# Patient Record
Sex: MALE | Race: WHITE | NOT HISPANIC OR LATINO | Employment: OTHER | URBAN - METROPOLITAN AREA
[De-identification: names, ages, dates, MRNs, and addresses within clinical notes are randomized per-mention and may not be internally consistent; named-entity substitution may affect disease eponyms.]

---

## 2017-02-10 ENCOUNTER — HOSPITAL ENCOUNTER (OUTPATIENT)
Dept: RESPIRATORY THERAPY | Facility: HOSPITAL | Age: 72
Discharge: HOME | End: 2017-02-10
Attending: FAMILY MEDICINE | Admitting: FAMILY MEDICINE

## 2017-10-27 ENCOUNTER — HOSPITAL ENCOUNTER (OUTPATIENT)
Dept: GENERAL RADIOLOGY | Facility: HOSPITAL | Age: 72
Discharge: HOME | End: 2017-10-27
Attending: PHYSICAL MEDICINE & REHABILITATION | Admitting: PHYSICAL MEDICINE & REHABILITATION

## 2019-01-01 ENCOUNTER — TELEPHONE (OUTPATIENT)
Dept: FAMILY MEDICINE CLINIC | Facility: CLINIC | Age: 74
End: 2019-01-01

## 2019-01-01 ENCOUNTER — OFFICE VISIT (OUTPATIENT)
Dept: FAMILY MEDICINE CLINIC | Facility: CLINIC | Age: 74
End: 2019-01-01

## 2019-01-01 ENCOUNTER — HOSPITAL ENCOUNTER (EMERGENCY)
Facility: HOSPITAL | Age: 74
Discharge: HOME | End: 2019-11-25
Attending: EMERGENCY MEDICINE | Admitting: EMERGENCY MEDICINE

## 2019-01-01 ENCOUNTER — APPOINTMENT (OUTPATIENT)
Dept: GENERAL RADIOLOGY | Facility: HOSPITAL | Age: 74
End: 2019-01-01

## 2019-01-01 ENCOUNTER — PATIENT OUTREACH (OUTPATIENT)
Dept: CASE MANAGEMENT | Facility: OTHER | Age: 74
End: 2019-01-01

## 2019-01-01 VITALS
HEIGHT: 65 IN | SYSTOLIC BLOOD PRESSURE: 122 MMHG | HEART RATE: 76 BPM | BODY MASS INDEX: 24.16 KG/M2 | RESPIRATION RATE: 16 BRPM | DIASTOLIC BLOOD PRESSURE: 78 MMHG | TEMPERATURE: 98.2 F | WEIGHT: 145 LBS

## 2019-01-01 VITALS
HEART RATE: 89 BPM | RESPIRATION RATE: 16 BRPM | WEIGHT: 148 LBS | TEMPERATURE: 98 F | HEIGHT: 68 IN | BODY MASS INDEX: 22.43 KG/M2 | SYSTOLIC BLOOD PRESSURE: 132 MMHG | DIASTOLIC BLOOD PRESSURE: 89 MMHG | OXYGEN SATURATION: 99 %

## 2019-01-01 DIAGNOSIS — G89.29 CHRONIC BILATERAL LOW BACK PAIN WITHOUT SCIATICA: ICD-10-CM

## 2019-01-01 DIAGNOSIS — G89.29 CHRONIC BILATERAL LOW BACK PAIN WITHOUT SCIATICA: Primary | ICD-10-CM

## 2019-01-01 DIAGNOSIS — K21.00 GASTROESOPHAGEAL REFLUX DISEASE WITH ESOPHAGITIS: ICD-10-CM

## 2019-01-01 DIAGNOSIS — M54.50 CHRONIC BILATERAL LOW BACK PAIN WITHOUT SCIATICA: Primary | ICD-10-CM

## 2019-01-01 DIAGNOSIS — I50.9 ACUTE ON CHRONIC CONGESTIVE HEART FAILURE, UNSPECIFIED HEART FAILURE TYPE (HCC): ICD-10-CM

## 2019-01-01 DIAGNOSIS — F31.77 BIPOLAR DISORDER, IN PARTIAL REMISSION, MOST RECENT EPISODE MIXED (HCC): ICD-10-CM

## 2019-01-01 DIAGNOSIS — I50.82 BIVENTRICULAR HEART FAILURE (HCC): ICD-10-CM

## 2019-01-01 DIAGNOSIS — J44.1 CHRONIC OBSTRUCTIVE PULMONARY DISEASE WITH ACUTE EXACERBATION (HCC): Primary | ICD-10-CM

## 2019-01-01 DIAGNOSIS — F41.1 GENERALIZED ANXIETY DISORDER: ICD-10-CM

## 2019-01-01 DIAGNOSIS — R06.00 DYSPNEA, UNSPECIFIED TYPE: Primary | ICD-10-CM

## 2019-01-01 DIAGNOSIS — M54.50 CHRONIC BILATERAL LOW BACK PAIN WITHOUT SCIATICA: ICD-10-CM

## 2019-01-01 LAB
ANION GAP SERPL CALCULATED.3IONS-SCNC: 11 MMOL/L (ref 5–15)
BASOPHILS # BLD AUTO: 0 10*3/MM3 (ref 0–0.2)
BASOPHILS NFR BLD AUTO: 0.5 % (ref 0–1.5)
BUN BLD-MCNC: 12 MG/DL (ref 8–23)
BUN/CREAT SERPL: 12.9 (ref 7–25)
CALCIUM SPEC-SCNC: 8.8 MG/DL (ref 8.6–10.5)
CHLORIDE SERPL-SCNC: 96 MMOL/L (ref 98–107)
CO2 SERPL-SCNC: 27 MMOL/L (ref 22–29)
CREAT BLD-MCNC: 0.93 MG/DL (ref 0.76–1.27)
DEPRECATED RDW RBC AUTO: 45.5 FL (ref 37–54)
EOSINOPHIL # BLD AUTO: 0.1 10*3/MM3 (ref 0–0.4)
EOSINOPHIL NFR BLD AUTO: 1 % (ref 0.3–6.2)
ERYTHROCYTE [DISTWIDTH] IN BLOOD BY AUTOMATED COUNT: 14.1 % (ref 12.3–15.4)
GFR SERPL CREATININE-BSD FRML MDRD: 79 ML/MIN/1.73
GLUCOSE BLD-MCNC: 98 MG/DL (ref 65–99)
HCT VFR BLD AUTO: 33.2 % (ref 37.5–51)
HGB BLD-MCNC: 11.2 G/DL (ref 13–17.7)
LYMPHOCYTES # BLD AUTO: 1.1 10*3/MM3 (ref 0.7–3.1)
LYMPHOCYTES NFR BLD AUTO: 11.5 % (ref 19.6–45.3)
MCH RBC QN AUTO: 30.8 PG (ref 26.6–33)
MCHC RBC AUTO-ENTMCNC: 33.6 G/DL (ref 31.5–35.7)
MCV RBC AUTO: 91.7 FL (ref 79–97)
MONOCYTES # BLD AUTO: 1 10*3/MM3 (ref 0.1–0.9)
MONOCYTES NFR BLD AUTO: 10.2 % (ref 5–12)
NEUTROPHILS # BLD AUTO: 7.2 10*3/MM3 (ref 1.7–7)
NEUTROPHILS NFR BLD AUTO: 76.8 % (ref 42.7–76)
NRBC BLD AUTO-RTO: 0 /100 WBC (ref 0–0.2)
NT-PROBNP SERPL-MCNC: 5871 PG/ML (ref 5–1800)
PLATELET # BLD AUTO: 297 10*3/MM3 (ref 140–450)
PMV BLD AUTO: 6.5 FL (ref 6–12)
POTASSIUM BLD-SCNC: 4.3 MMOL/L (ref 3.5–5.2)
RBC # BLD AUTO: 3.62 10*6/MM3 (ref 4.14–5.8)
SODIUM BLD-SCNC: 134 MMOL/L (ref 136–145)
TROPONIN T SERPL-MCNC: <0.01 NG/ML (ref 0–0.03)
WBC NRBC COR # BLD: 9.4 10*3/MM3 (ref 3.4–10.8)

## 2019-01-01 PROCEDURE — 71045 X-RAY EXAM CHEST 1 VIEW: CPT

## 2019-01-01 PROCEDURE — 99284 EMERGENCY DEPT VISIT MOD MDM: CPT

## 2019-01-01 PROCEDURE — 84484 ASSAY OF TROPONIN QUANT: CPT | Performed by: EMERGENCY MEDICINE

## 2019-01-01 PROCEDURE — 85025 COMPLETE CBC W/AUTO DIFF WBC: CPT | Performed by: EMERGENCY MEDICINE

## 2019-01-01 PROCEDURE — 80048 BASIC METABOLIC PNL TOTAL CA: CPT | Performed by: EMERGENCY MEDICINE

## 2019-01-01 PROCEDURE — 94640 AIRWAY INHALATION TREATMENT: CPT

## 2019-01-01 PROCEDURE — 96374 THER/PROPH/DIAG INJ IV PUSH: CPT

## 2019-01-01 PROCEDURE — 94799 UNLISTED PULMONARY SVC/PX: CPT

## 2019-01-01 PROCEDURE — 25010000002 FUROSEMIDE PER 20 MG: Performed by: EMERGENCY MEDICINE

## 2019-01-01 PROCEDURE — 99214 OFFICE O/P EST MOD 30 MIN: CPT | Performed by: FAMILY MEDICINE

## 2019-01-01 PROCEDURE — 83880 ASSAY OF NATRIURETIC PEPTIDE: CPT | Performed by: EMERGENCY MEDICINE

## 2019-01-01 PROCEDURE — 96375 TX/PRO/DX INJ NEW DRUG ADDON: CPT

## 2019-01-01 PROCEDURE — 93005 ELECTROCARDIOGRAM TRACING: CPT | Performed by: EMERGENCY MEDICINE

## 2019-01-01 PROCEDURE — 25010000002 METHYLPREDNISOLONE PER 125 MG: Performed by: EMERGENCY MEDICINE

## 2019-01-01 RX ORDER — IPRATROPIUM BROMIDE AND ALBUTEROL SULFATE 2.5; .5 MG/3ML; MG/3ML
3 SOLUTION RESPIRATORY (INHALATION)
Qty: 360 ML | Refills: 5 | Status: SHIPPED | OUTPATIENT
Start: 2019-01-01 | End: 2020-01-01

## 2019-01-01 RX ORDER — METHYLPREDNISOLONE SODIUM SUCCINATE 125 MG/2ML
125 INJECTION, POWDER, LYOPHILIZED, FOR SOLUTION INTRAMUSCULAR; INTRAVENOUS ONCE
Status: COMPLETED | OUTPATIENT
Start: 2019-01-01 | End: 2019-01-01

## 2019-01-01 RX ORDER — AMIODARONE HYDROCHLORIDE 200 MG/1
TABLET ORAL
Qty: 45 TABLET | Refills: 2 | Status: SHIPPED | OUTPATIENT
Start: 2019-01-01 | End: 2019-01-01

## 2019-01-01 RX ORDER — MORPHINE SULFATE 15 MG/1
15 TABLET, FILM COATED, EXTENDED RELEASE ORAL 2 TIMES DAILY
Qty: 60 TABLET | Refills: 0 | Status: SHIPPED | OUTPATIENT
Start: 2019-01-01 | End: 2020-01-01 | Stop reason: SDUPTHER

## 2019-01-01 RX ORDER — QUETIAPINE FUMARATE 50 MG/1
50 TABLET, FILM COATED ORAL NIGHTLY
Refills: 0 | COMMUNITY
Start: 2019-01-01

## 2019-01-01 RX ORDER — OXYCODONE AND ACETAMINOPHEN 10; 325 MG/1; MG/1
1 TABLET ORAL
Qty: 150 TABLET | Refills: 0 | Status: SHIPPED | OUTPATIENT
Start: 2019-01-01 | End: 2019-01-01 | Stop reason: SDUPTHER

## 2019-01-01 RX ORDER — PAROXETINE HYDROCHLORIDE 40 MG/1
40 TABLET, FILM COATED ORAL DAILY
Refills: 0 | COMMUNITY
Start: 2019-01-01

## 2019-01-01 RX ORDER — MORPHINE SULFATE 15 MG/1
15 TABLET, FILM COATED, EXTENDED RELEASE ORAL 2 TIMES DAILY
Qty: 60 TABLET | Refills: 0 | Status: SHIPPED | OUTPATIENT
Start: 2019-01-01 | End: 2019-01-01 | Stop reason: SDUPTHER

## 2019-01-01 RX ORDER — SODIUM CHLORIDE 0.9 % (FLUSH) 0.9 %
10 SYRINGE (ML) INJECTION AS NEEDED
Status: DISCONTINUED | OUTPATIENT
Start: 2019-01-01 | End: 2019-01-01 | Stop reason: HOSPADM

## 2019-01-01 RX ORDER — OMEPRAZOLE 20 MG/1
CAPSULE, DELAYED RELEASE ORAL
COMMUNITY
Start: 2015-02-19 | End: 2020-01-01 | Stop reason: RX

## 2019-01-01 RX ORDER — FUROSEMIDE 10 MG/ML
40 INJECTION INTRAMUSCULAR; INTRAVENOUS ONCE
Status: COMPLETED | OUTPATIENT
Start: 2019-01-01 | End: 2019-01-01

## 2019-01-01 RX ORDER — IPRATROPIUM BROMIDE AND ALBUTEROL SULFATE 2.5; .5 MG/3ML; MG/3ML
3 SOLUTION RESPIRATORY (INHALATION) ONCE
Status: COMPLETED | OUTPATIENT
Start: 2019-01-01 | End: 2019-01-01

## 2019-01-01 RX ORDER — LAMOTRIGINE 100 MG/1
200 TABLET ORAL EVERY MORNING
Refills: 1 | COMMUNITY
Start: 2019-01-01

## 2019-01-01 RX ORDER — LOSARTAN POTASSIUM 100 MG/1
TABLET ORAL
Qty: 90 TABLET | Refills: 2 | Status: SHIPPED | OUTPATIENT
Start: 2019-01-01 | End: 2020-01-01

## 2019-01-01 RX ORDER — AMIODARONE HYDROCHLORIDE 200 MG/1
TABLET ORAL
Qty: 45 TABLET | Refills: 0 | Status: SHIPPED | OUTPATIENT
Start: 2019-01-01 | End: 2019-01-01 | Stop reason: SDUPTHER

## 2019-01-01 RX ORDER — IPRATROPIUM BROMIDE AND ALBUTEROL SULFATE 2.5; .5 MG/3ML; MG/3ML
SOLUTION RESPIRATORY (INHALATION)
Qty: 360 ML | Refills: 5 | Status: SHIPPED | OUTPATIENT
Start: 2019-01-01 | End: 2019-01-01 | Stop reason: SDUPTHER

## 2019-01-01 RX ORDER — ALBUTEROL SULFATE 2.5 MG/3ML
2.5 SOLUTION RESPIRATORY (INHALATION)
Status: COMPLETED | OUTPATIENT
Start: 2019-01-01 | End: 2019-01-01

## 2019-01-01 RX ORDER — METOPROLOL TARTRATE 50 MG/1
50 TABLET, FILM COATED ORAL
COMMUNITY
End: 2020-01-01

## 2019-01-01 RX ORDER — OXYCODONE AND ACETAMINOPHEN 10; 325 MG/1; MG/1
1 TABLET ORAL
Qty: 150 TABLET | Refills: 0 | Status: SHIPPED | OUTPATIENT
Start: 2019-01-01 | End: 2020-01-01 | Stop reason: SDUPTHER

## 2019-01-01 RX ORDER — AMIODARONE HYDROCHLORIDE 100 MG/1
50 TABLET ORAL DAILY
Status: ON HOLD | COMMUNITY
End: 2020-01-01

## 2019-01-01 RX ORDER — FUROSEMIDE 40 MG/1
40 TABLET ORAL DAILY
Qty: 30 TABLET | Refills: 2 | Status: SHIPPED | OUTPATIENT
Start: 2019-01-01 | End: 2020-01-01

## 2019-01-01 RX ORDER — LOSARTAN POTASSIUM 100 MG/1
TABLET ORAL
Qty: 30 TABLET | Refills: 0 | Status: SHIPPED | OUTPATIENT
Start: 2019-01-01 | End: 2019-01-01 | Stop reason: SDUPTHER

## 2019-01-01 RX ADMIN — METHYLPREDNISOLONE SODIUM SUCCINATE 125 MG: 125 INJECTION, POWDER, FOR SOLUTION INTRAMUSCULAR; INTRAVENOUS at 19:48

## 2019-01-01 RX ADMIN — ALBUTEROL SULFATE 2.5 MG: 2.5 SOLUTION RESPIRATORY (INHALATION) at 20:01

## 2019-01-01 RX ADMIN — IPRATROPIUM BROMIDE AND ALBUTEROL SULFATE 3 ML: .5; 3 SOLUTION RESPIRATORY (INHALATION) at 19:57

## 2019-01-01 RX ADMIN — FUROSEMIDE 40 MG: 10 INJECTION, SOLUTION INTRAMUSCULAR; INTRAVENOUS at 21:22

## 2019-01-01 RX ADMIN — ALBUTEROL SULFATE 2.5 MG: 2.5 SOLUTION RESPIRATORY (INHALATION) at 20:00

## 2019-07-24 RX ORDER — IPRATROPIUM BROMIDE AND ALBUTEROL SULFATE 2.5; .5 MG/3ML; MG/3ML
SOLUTION RESPIRATORY (INHALATION)
Qty: 360 ML | Refills: 0 | Status: SHIPPED | OUTPATIENT
Start: 2019-07-24 | End: 2019-01-01 | Stop reason: SDUPTHER

## 2019-07-31 RX ORDER — MORPHINE SULFATE 15 MG/1
TABLET, FILM COATED, EXTENDED RELEASE ORAL
COMMUNITY
Start: 2014-11-19 | End: 2019-07-31 | Stop reason: SDUPTHER

## 2019-07-31 RX ORDER — OXYCODONE AND ACETAMINOPHEN 10; 325 MG/1; MG/1
TABLET ORAL
COMMUNITY
Start: 2014-06-23 | End: 2019-07-31 | Stop reason: SDUPTHER

## 2019-08-01 RX ORDER — MORPHINE SULFATE 15 MG/1
15 TABLET, FILM COATED, EXTENDED RELEASE ORAL 2 TIMES DAILY
Qty: 60 TABLET | Refills: 0 | Status: SHIPPED | OUTPATIENT
Start: 2019-08-01 | End: 2019-01-01 | Stop reason: SDUPTHER

## 2019-08-01 RX ORDER — OXYCODONE AND ACETAMINOPHEN 10; 325 MG/1; MG/1
1 TABLET ORAL
Qty: 150 TABLET | Refills: 0 | Status: SHIPPED | OUTPATIENT
Start: 2019-08-01 | End: 2019-01-01 | Stop reason: SDUPTHER

## 2019-11-26 NOTE — ED PROVIDER NOTES
"Subjective   History of Present Illness  Shortness of breath  74-year-old male presents complaining of shortness of breath when he walked in the [employer].  He denies chest pain.  He states he did no significant cough.  States he is on chronic home O2.  He reports no fevers or chills.  He reports no relieving or just putting factors   Review of Systems   Constitutional: Negative.    HENT: Negative.    Eyes: Negative.    Respiratory: Positive for shortness of breath and wheezing.    Cardiovascular: Negative.    Gastrointestinal: Negative.    Endocrine: Negative.    Genitourinary: Negative.    Musculoskeletal: Negative.    Skin: Negative.    Neurological: Negative.    Psychiatric/Behavioral: Negative.        Past Medical History:   Diagnosis Date   • AAA (abdominal aortic aneurysm) (CMS/[facility])    • Atrial fibrillation (CMS/[facility])    • Bipolar affective disorder (CMS/[facility])    • GERD (gastroesophageal reflux disease)    • HTN (hypertension)    • Myocardial infarction (CMS/[facility]) 2000   • Perforated ulcer (CMS/[facility])    • Sepsis (CMS/[facility])        No Known Allergies    Past Surgical History:   Procedure Laterality Date   • ABDOMINAL AORTIC ANEURYSM REPAIR     • CHOLECYSTECTOMY     • CORONARY ANGIOPLASTY     • CORONARY ARTERY BYPASS GRAFT  03/13/2014   • THORACOTOMY Right    • TOTAL HIP ARTHROPLASTY Left    • TOTAL HIP ARTHROPLASTY Right 02/03/2014       Family History   Problem Relation Age of Onset   • Heart disease Other    • Hypertension Other    • Hyperlipidemia Other        Social History     Socioeconomic History   • Marital status:      Spouse name: Not on file   • Number of children: Not on file   • Years of education: Not on file   • Highest education level: Not on file   Tobacco Use   • Smoking status: Current Every Day Smoker           Objective   Physical Exam  /73   Pulse 77   Temp 98.2 °F (36.8 °C) (Oral)   Resp 18   Ht 171.5 cm (67.5\")   Wt 67.1 kg (148 lb)   SpO2 94%   BMI 22.84 kg/m² "   General: Well-developed well-appearing, no acute distress, alert and appropriate  Eyes: Pupils round and reactive, sclera nonicteric  HEENT: Mucous membranes moist, no mucosal swelling  Neck: Supple, no nuchal rigidity, no lymphadenopathy  Respirations: Bilateral expiratory wheeze, respirations mildly tachypneic  Heart regular rate and rhythm, no murmurs rubs or gallops,   Abdomen soft nontender nondistended, no hepatosplenomegaly, no hernia, no mass, normal bowel sounds, no CVA tenderness  Extremities no clubbing cyanosis or edema, calves are symmetric and nontender  Neuro cranial nerves grossly intact, no focal limb deficits  Psych oriented, pleasant affect  Skin no rash, brisk cap refill  Procedures           ED Course      EKG shows sinus rhythm, rate of 70 left anterior fascicular block          Results for orders placed or performed during the hospital encounter of 11/25/19   Basic Metabolic Panel   Result Value Ref Range    Glucose 98 65 - 99 mg/dL    BUN 12 8 - 23 mg/dL    Creatinine 0.93 0.76 - 1.27 mg/dL    Sodium 134 (L) 136 - 145 mmol/L    Potassium 4.3 3.5 - 5.2 mmol/L    Chloride 96 (L) 98 - 107 mmol/L    CO2 27.0 22.0 - 29.0 mmol/L    Calcium 8.8 8.6 - 10.5 mg/dL    eGFR Non African Amer 79 >60 mL/min/1.73    BUN/Creatinine Ratio 12.9 7.0 - 25.0    Anion Gap 11.0 5.0 - 15.0 mmol/L   BNP   Result Value Ref Range    proBNP 5,871.0 (H) 5.0-1,800.0 pg/mL   Troponin   Result Value Ref Range    Troponin T <0.010 0.000 - 0.030 ng/mL   CBC Auto Differential   Result Value Ref Range    WBC 9.40 3.40 - 10.80 10*3/mm3    RBC 3.62 (L) 4.14 - 5.80 10*6/mm3    Hemoglobin 11.2 (L) 13.0 - 17.7 g/dL    Hematocrit 33.2 (L) 37.5 - 51.0 %    MCV 91.7 79.0 - 97.0 fL    MCH 30.8 26.6 - 33.0 pg    MCHC 33.6 31.5 - 35.7 g/dL    RDW 14.1 12.3 - 15.4 %    RDW-SD 45.5 37.0 - 54.0 fl    MPV 6.5 6.0 - 12.0 fL    Platelets 297 140 - 450 10*3/mm3    Neutrophil % 76.8 (H) 42.7 - 76.0 %    Lymphocyte % 11.5 (L) 19.6 - 45.3 %     Monocyte % 10.2 5.0 - 12.0 %    Eosinophil % 1.0 0.3 - 6.2 %    Basophil % 0.5 0.0 - 1.5 %    Neutrophils, Absolute 7.20 (H) 1.70 - 7.00 10*3/mm3    Lymphocytes, Absolute 1.10 0.70 - 3.10 10*3/mm3    Monocytes, Absolute 1.00 (H) 0.10 - 0.90 10*3/mm3    Eosinophils, Absolute 0.10 0.00 - 0.40 10*3/mm3    Basophils, Absolute 0.00 0.00 - 0.20 10*3/mm3    nRBC 0.0 0.0 - 0.2 /100 WBC     Xr Chest 1 View    Result Date: 11/25/2019   1. Development of mild right basilar atelectasis 2 change of CABG without evidence of failure 3. Old healed right rib fractures 4. Mild change from study of 11/15/2017  Electronically Signed By-Jonn Callahan Jr. On:11/25/2019 8:01 PM This report was finalized on [id number] by  Jonn Callahan Jr., .        MDM  Patient presented with some dyspnea he does have elevated BNP.  I suspect he does have some acute on chronic congestive heart failure.  On reexamination his symptoms resolved and he wants to go home he was advised the findings and I encouraged him to stay in the hospital night to get further treatment.  He voiced understanding to the risk of going home and the potential adverse outcome.  He will follow-up with his primary care physician he was given warning signs for return.  Chest x-ray was negative for pneumonia or pneumothorax.  He is not describing chest pain.  His initial troponin was normal.  Final diagnoses:   Dyspnea, unspecified type   Acute on chronic congestive heart failure, unspecified heart failure type (CMS/[facility])              Junito Cruz MD  11/25/19 4268

## 2019-11-26 NOTE — OUTREACH NOTE
Care Coordination Assessment    Documented/Reviewed By:  Aron Boss RN Date/time:  11/26/2019 12:58 PM   Assessment completed with:  spouse or significant other (Comment: Merry)  Enrolled in care management program:  No  Living arrangement:  spouse  Support system:  shelley based, children, spouse  Type of residence:  private residence  Home care services:  No  Equipment used at home:  walker, cane  Communication device:  Yes  Bed or wheelchair confined:  No  Medication adherence problem:  No  History of fall(s) in last 6 months:  No  Difficulty keeping appointments:  No
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
## 2019-12-06 PROBLEM — I50.9 HEART FAILURE (HCC): Status: ACTIVE | Noted: 2017-02-02

## 2019-12-06 PROBLEM — G47.00 INSOMNIA: Status: ACTIVE | Noted: 2018-05-02

## 2019-12-06 PROBLEM — J18.9 HOSPITAL-ACQUIRED PNEUMONIA: Status: ACTIVE | Noted: 2017-07-06

## 2019-12-06 PROBLEM — K21.9 GASTROESOPHAGEAL REFLUX DISEASE: Status: ACTIVE | Noted: 2019-01-01

## 2019-12-06 PROBLEM — L03.317 CELLULITIS OF RIGHT BUTTOCK: Status: ACTIVE | Noted: 2017-12-14

## 2019-12-06 PROBLEM — S06.5XAA SUBDURAL HEMATOMA (HCC): Status: ACTIVE | Noted: 2017-12-01

## 2019-12-06 PROBLEM — I60.9 SUBARACHNOID HEMORRHAGE (HCC): Status: ACTIVE | Noted: 2017-12-01

## 2019-12-06 PROBLEM — I71.40 ABDOMINAL AORTIC ANEURYSM (AAA) (HCC): Status: ACTIVE | Noted: 2019-01-01

## 2019-12-06 PROBLEM — Z87.820 HX OF TRAUMATIC BRAIN INJURY: Status: ACTIVE | Noted: 2017-12-01

## 2019-12-06 PROBLEM — I48.91 ATRIAL FIBRILLATION (HCC): Status: ACTIVE | Noted: 2019-01-01

## 2019-12-06 PROBLEM — F17.200 NICOTINE DEPENDENCE: Status: ACTIVE | Noted: 2018-12-13

## 2019-12-20 NOTE — TELEPHONE ENCOUNTER
This makes call #3 this morning. Ingrid called the other day but it was for herself. I dont have one for Adolfo.     This is pending

## 2019-12-20 NOTE — TELEPHONE ENCOUNTER
Needs an rx sent in for Oxycodone 10/325mg takes 1-2 every 4-6 hours as needed #150.  Wife said she left a voice message on Wednesday but there is nothing in his chart.  Patient called back and said he is out of medication and needs this sent in asap.

## 2019-12-20 NOTE — TELEPHONE ENCOUNTER
Its pending and will be sent   She called for herself on wed  Because it was too early  We will get his rx sent today

## 2020-01-01 ENCOUNTER — OFFICE VISIT (OUTPATIENT)
Dept: FAMILY MEDICINE CLINIC | Facility: CLINIC | Age: 75
End: 2020-01-01

## 2020-01-01 ENCOUNTER — APPOINTMENT (OUTPATIENT)
Dept: GENERAL RADIOLOGY | Facility: HOSPITAL | Age: 75
End: 2020-01-01

## 2020-01-01 ENCOUNTER — READMISSION MANAGEMENT (OUTPATIENT)
Dept: CALL CENTER | Facility: HOSPITAL | Age: 75
End: 2020-01-01

## 2020-01-01 ENCOUNTER — HOSPITAL ENCOUNTER (OUTPATIENT)
Facility: HOSPITAL | Age: 75
Setting detail: OBSERVATION
Discharge: HOME | End: 2020-03-18
Attending: EMERGENCY MEDICINE | Admitting: INTERNAL MEDICINE

## 2020-01-01 ENCOUNTER — TELEPHONE (OUTPATIENT)
Dept: FAMILY MEDICINE CLINIC | Facility: CLINIC | Age: 75
End: 2020-01-01

## 2020-01-01 ENCOUNTER — APPOINTMENT (OUTPATIENT)
Dept: CARDIOLOGY | Facility: HOSPITAL | Age: 75
End: 2020-01-01

## 2020-01-01 ENCOUNTER — INPATIENT HOSPITAL (OUTPATIENT)
Dept: URBAN - METROPOLITAN AREA HOSPITAL 84 | Facility: HOSPITAL | Age: 75
End: 2020-01-01
Payer: MEDICARE

## 2020-01-01 ENCOUNTER — HOSPITAL ENCOUNTER (INPATIENT)
Facility: HOSPITAL | Age: 75
LOS: 9 days | End: 2020-08-10
Attending: EMERGENCY MEDICINE | Admitting: INTERNAL MEDICINE

## 2020-01-01 ENCOUNTER — APPOINTMENT (OUTPATIENT)
Dept: MRI IMAGING | Facility: HOSPITAL | Age: 75
End: 2020-01-01

## 2020-01-01 ENCOUNTER — APPOINTMENT (OUTPATIENT)
Dept: ULTRASOUND IMAGING | Facility: HOSPITAL | Age: 75
End: 2020-01-01

## 2020-01-01 ENCOUNTER — APPOINTMENT (OUTPATIENT)
Dept: CT IMAGING | Facility: HOSPITAL | Age: 75
End: 2020-01-01

## 2020-01-01 ENCOUNTER — TRANSITIONAL CARE MANAGEMENT TELEPHONE ENCOUNTER (OUTPATIENT)
Dept: FAMILY MEDICINE CLINIC | Facility: CLINIC | Age: 75
End: 2020-01-01

## 2020-01-01 VITALS
SYSTOLIC BLOOD PRESSURE: 132 MMHG | HEIGHT: 68 IN | WEIGHT: 147.71 LBS | OXYGEN SATURATION: 99 % | DIASTOLIC BLOOD PRESSURE: 57 MMHG | HEART RATE: 53 BPM | BODY MASS INDEX: 22.39 KG/M2 | RESPIRATION RATE: 20 BRPM | TEMPERATURE: 97.5 F

## 2020-01-01 VITALS
BODY MASS INDEX: 21.27 KG/M2 | TEMPERATURE: 98.5 F | WEIGHT: 127.65 LBS | SYSTOLIC BLOOD PRESSURE: 63 MMHG | HEIGHT: 65 IN | DIASTOLIC BLOOD PRESSURE: 44 MMHG | HEART RATE: 25 BPM | RESPIRATION RATE: 27 BRPM

## 2020-01-01 VITALS
HEART RATE: 56 BPM | TEMPERATURE: 99 F | WEIGHT: 140 LBS | DIASTOLIC BLOOD PRESSURE: 92 MMHG | BODY MASS INDEX: 21.29 KG/M2 | OXYGEN SATURATION: 94 % | SYSTOLIC BLOOD PRESSURE: 180 MMHG | RESPIRATION RATE: 8 BRPM

## 2020-01-01 DIAGNOSIS — R06.00 DYSPNEA, UNSPECIFIED TYPE: Primary | ICD-10-CM

## 2020-01-01 DIAGNOSIS — M54.50 CHRONIC BILATERAL LOW BACK PAIN WITHOUT SCIATICA: ICD-10-CM

## 2020-01-01 DIAGNOSIS — R41.82 ALTERED MENTAL STATUS, UNSPECIFIED ALTERED MENTAL STATUS TYPE: ICD-10-CM

## 2020-01-01 DIAGNOSIS — G89.29 CHRONIC BILATERAL LOW BACK PAIN WITHOUT SCIATICA: ICD-10-CM

## 2020-01-01 DIAGNOSIS — K92.2 GASTROINTESTINAL HEMORRHAGE, UNSPECIFIED: ICD-10-CM

## 2020-01-01 DIAGNOSIS — R94.5 ABNORMAL RESULTS OF LIVER FUNCTION STUDIES: ICD-10-CM

## 2020-01-01 DIAGNOSIS — J44.1 CHRONIC OBSTRUCTIVE PULMONARY DISEASE WITH ACUTE EXACERBATION (HCC): Primary | Chronic | ICD-10-CM

## 2020-01-01 DIAGNOSIS — R65.21 SEPTIC SHOCK (HCC): ICD-10-CM

## 2020-01-01 DIAGNOSIS — J44.1 COPD EXACERBATION (HCC): ICD-10-CM

## 2020-01-01 DIAGNOSIS — J96.21 ACUTE ON CHRONIC RESPIRATORY FAILURE WITH HYPOXIA AND HYPERCAPNIA (HCC): ICD-10-CM

## 2020-01-01 DIAGNOSIS — J96.22 ACUTE ON CHRONIC RESPIRATORY FAILURE WITH HYPOXIA AND HYPERCAPNIA (HCC): ICD-10-CM

## 2020-01-01 DIAGNOSIS — A41.9 SEPTIC SHOCK (HCC): ICD-10-CM

## 2020-01-01 DIAGNOSIS — I50.20 SYSTOLIC CONGESTIVE HEART FAILURE, UNSPECIFIED HF CHRONICITY (HCC): ICD-10-CM

## 2020-01-01 DIAGNOSIS — J44.9 CHRONIC OBSTRUCTIVE PULMONARY DISEASE, UNSPECIFIED COPD TYPE (HCC): Primary | ICD-10-CM

## 2020-01-01 LAB
ACTIN IGG SERPL-ACNC: 6 UNITS (ref 0–19)
ALBUMIN SERPL-MCNC: 2.4 G/DL (ref 3.5–5.2)
ALBUMIN SERPL-MCNC: 2.6 G/DL (ref 3.5–5.2)
ALBUMIN SERPL-MCNC: 2.7 G/DL (ref 3.5–5.2)
ALBUMIN SERPL-MCNC: 2.8 G/DL (ref 3.5–5.2)
ALBUMIN SERPL-MCNC: 3.4 G/DL (ref 3.5–5.2)
ALBUMIN SERPL-MCNC: 3.4 G/DL (ref 3.5–5.2)
ALBUMIN SERPL-MCNC: 3.8 G/DL (ref 3.5–5.2)
ALBUMIN SERPL-MCNC: 3.9 G/DL (ref 3.5–5.2)
ALBUMIN SERPL-MCNC: 4.2 G/DL (ref 3.5–5.2)
ALBUMIN SERPL-MCNC: 4.4 G/DL (ref 3.5–5.2)
ALBUMIN/GLOB SERPL: 0.7 G/DL
ALBUMIN/GLOB SERPL: 0.7 G/DL
ALBUMIN/GLOB SERPL: 0.8 G/DL
ALBUMIN/GLOB SERPL: 0.9 G/DL
ALBUMIN/GLOB SERPL: 0.9 G/DL
ALBUMIN/GLOB SERPL: 1 G/DL
ALBUMIN/GLOB SERPL: 1.1 G/DL
ALBUMIN/GLOB SERPL: 1.2 G/DL
ALP LIVER CFR SERPL: 1.3 UNITS (ref 0–20)
ALP SERPL-CCNC: 113 U/L (ref 39–117)
ALP SERPL-CCNC: 114 U/L (ref 39–117)
ALP SERPL-CCNC: 121 U/L (ref 39–117)
ALP SERPL-CCNC: 133 U/L (ref 39–117)
ALP SERPL-CCNC: 142 U/L (ref 39–117)
ALP SERPL-CCNC: 269 U/L (ref 39–117)
ALP SERPL-CCNC: 432 U/L (ref 39–117)
ALP SERPL-CCNC: 462 U/L (ref 39–117)
ALP SERPL-CCNC: 481 U/L (ref 39–117)
ALP SERPL-CCNC: 525 U/L (ref 39–117)
ALP SERPL-CCNC: 580 U/L (ref 39–117)
ALP SERPL-CCNC: 592 U/L (ref 39–117)
ALPHA1 GLOB MFR UR ELPH: 289 MG/DL (ref 90–200)
ALT SERPL W P-5'-P-CCNC: 14 U/L (ref 1–41)
ALT SERPL W P-5'-P-CCNC: 145 U/L (ref 1–41)
ALT SERPL W P-5'-P-CCNC: 16 U/L (ref 1–41)
ALT SERPL W P-5'-P-CCNC: 16 U/L (ref 1–41)
ALT SERPL W P-5'-P-CCNC: 175 U/L (ref 1–41)
ALT SERPL W P-5'-P-CCNC: 21 U/L (ref 1–41)
ALT SERPL W P-5'-P-CCNC: 225 U/L (ref 1–41)
ALT SERPL W P-5'-P-CCNC: 248 U/L (ref 1–41)
ALT SERPL W P-5'-P-CCNC: 334 U/L (ref 1–41)
ALT SERPL W P-5'-P-CCNC: 341 U/L (ref 1–41)
ALT SERPL W P-5'-P-CCNC: 38 U/L (ref 1–41)
ALT SERPL W P-5'-P-CCNC: 47 U/L (ref 1–41)
AMMONIA BLD-SCNC: 121 UMOL/L (ref 16–60)
AMPHET+METHAMPHET UR QL: NEGATIVE
ANA SER QL: NEGATIVE
ANION GAP SERPL CALCULATED.3IONS-SCNC: 12 MMOL/L (ref 5–15)
ANION GAP SERPL CALCULATED.3IONS-SCNC: 14 MMOL/L (ref 5–15)
ANION GAP SERPL CALCULATED.3IONS-SCNC: 14 MMOL/L (ref 5–15)
ANION GAP SERPL CALCULATED.3IONS-SCNC: 16 MMOL/L (ref 5–15)
ANION GAP SERPL CALCULATED.3IONS-SCNC: 17 MMOL/L (ref 5–15)
ANION GAP SERPL CALCULATED.3IONS-SCNC: 18 MMOL/L (ref 5–15)
ANION GAP SERPL CALCULATED.3IONS-SCNC: 19 MMOL/L (ref 5–15)
ANION GAP SERPL CALCULATED.3IONS-SCNC: 21 MMOL/L (ref 5–15)
ANION GAP SERPL CALCULATED.3IONS-SCNC: 24 MMOL/L (ref 5–15)
ANION GAP SERPL CALCULATED.3IONS-SCNC: 28 MMOL/L (ref 5–15)
ANION GAP SERPL CALCULATED.3IONS-SCNC: 7 MMOL/L (ref 5–15)
ANION GAP SERPL CALCULATED.3IONS-SCNC: 9 MMOL/L (ref 5–15)
ANISOCYTOSIS BLD QL: ABNORMAL
APAP SERPL-MCNC: <5 MCG/ML (ref 10–30)
ARTERIAL PATENCY WRIST A: ABNORMAL
ARTERIAL PATENCY WRIST A: POSITIVE
AST SERPL-CCNC: 109 U/L (ref 1–40)
AST SERPL-CCNC: 118 U/L (ref 1–40)
AST SERPL-CCNC: 20 U/L (ref 1–40)
AST SERPL-CCNC: 24 U/L (ref 1–40)
AST SERPL-CCNC: 344 U/L (ref 1–40)
AST SERPL-CCNC: 38 U/L (ref 1–40)
AST SERPL-CCNC: 409 U/L (ref 1–40)
AST SERPL-CCNC: 523 U/L (ref 1–40)
AST SERPL-CCNC: 539 U/L (ref 1–40)
AST SERPL-CCNC: 63 U/L (ref 1–40)
AST SERPL-CCNC: 695 U/L (ref 1–40)
AST SERPL-CCNC: 698 U/L (ref 1–40)
ATMOSPHERIC PRESS: ABNORMAL MM[HG]
B PARAPERT DNA SPEC QL NAA+PROBE: NOT DETECTED
B PERT DNA SPEC QL NAA+PROBE: NOT DETECTED
B PERT DNA SPEC QL NAA+PROBE: NOT DETECTED
BACTERIA BLD CULT: ABNORMAL
BACTERIA SPEC AEROBE CULT: ABNORMAL
BACTERIA SPEC AEROBE CULT: ABNORMAL
BACTERIA SPEC AEROBE CULT: NORMAL
BACTERIA UR QL AUTO: ABNORMAL /HPF
BACTERIA UR QL AUTO: ABNORMAL /HPF
BARBITURATES UR QL SCN: NEGATIVE
BASE EXCESS BLDA CALC-SCNC: -0.2 MMOL/L (ref 0–3)
BASE EXCESS BLDA CALC-SCNC: -1.4 MMOL/L (ref 0–3)
BASE EXCESS BLDA CALC-SCNC: -4.5 MMOL/L (ref 0–3)
BASE EXCESS BLDA CALC-SCNC: 12 MMOL/L (ref 0–3)
BASE EXCESS BLDA CALC-SCNC: 13.1 MMOL/L (ref 0–3)
BASE EXCESS BLDA CALC-SCNC: 18.8 MMOL/L (ref 0–3)
BASE EXCESS BLDA CALC-SCNC: 2.5 MMOL/L (ref 0–3)
BASE EXCESS BLDA CALC-SCNC: 22.3 MMOL/L (ref 0–3)
BASE EXCESS BLDA CALC-SCNC: 5.6 MMOL/L (ref 0–3)
BASE EXCESS BLDA CALC-SCNC: 7.9 MMOL/L (ref 0–3)
BASE EXCESS BLDA CALC-SCNC: 8.5 MMOL/L (ref 0–3)
BASE EXCESS BLDA CALC-SCNC: 9.4 MMOL/L (ref 0–3)
BASOPHILS # BLD AUTO: 0 10*3/MM3 (ref 0–0.2)
BASOPHILS # BLD AUTO: 0.1 10*3/MM3 (ref 0–0.2)
BASOPHILS NFR BLD AUTO: 0.2 % (ref 0–1.5)
BASOPHILS NFR BLD AUTO: 0.3 % (ref 0–1.5)
BASOPHILS NFR BLD AUTO: 0.4 % (ref 0–1.5)
BASOPHILS NFR BLD AUTO: 0.5 % (ref 0–1.5)
BASOPHILS NFR BLD AUTO: 0.7 % (ref 0–1.5)
BDY SITE: ABNORMAL
BENZODIAZ UR QL SCN: NEGATIVE
BH CV ECHO MEAS - ACS: 1.6 CM
BH CV ECHO MEAS - AI DEC SLOPE: 129.1 CM/SEC^2
BH CV ECHO MEAS - AI DEC TIME: 2.2 SEC
BH CV ECHO MEAS - AI MAX PG: 31.9 MMHG
BH CV ECHO MEAS - AI MAX VEL: 282.4 CM/SEC
BH CV ECHO MEAS - AI P1/2T: 640.6 MSEC
BH CV ECHO MEAS - AO MAX PG (FULL): 9.4 MMHG
BH CV ECHO MEAS - AO MAX PG: 13.5 MMHG
BH CV ECHO MEAS - AO MEAN PG (FULL): 3.8 MMHG
BH CV ECHO MEAS - AO MEAN PG: 6 MMHG
BH CV ECHO MEAS - AO ROOT AREA (BSA CORRECTED): 1.8
BH CV ECHO MEAS - AO ROOT AREA: 8 CM^2
BH CV ECHO MEAS - AO ROOT DIAM: 3.2 CM
BH CV ECHO MEAS - AO V2 MAX: 183.8 CM/SEC
BH CV ECHO MEAS - AO V2 MEAN: 114.1 CM/SEC
BH CV ECHO MEAS - AO V2 VTI: 36.9 CM
BH CV ECHO MEAS - AVA(I,A): 2.2 CM^2
BH CV ECHO MEAS - AVA(I,D): 2.2 CM^2
BH CV ECHO MEAS - AVA(V,A): 2 CM^2
BH CV ECHO MEAS - AVA(V,D): 2 CM^2
BH CV ECHO MEAS - BSA(HAYCOCK): 1.8 M^2
BH CV ECHO MEAS - BSA: 1.8 M^2
BH CV ECHO MEAS - BZI_BMI: 22 KILOGRAMS/M^2
BH CV ECHO MEAS - BZI_METRIC_HEIGHT: 172.7 CM
BH CV ECHO MEAS - BZI_METRIC_WEIGHT: 65.8 KG
BH CV ECHO MEAS - EDV(CUBED): 107 ML
BH CV ECHO MEAS - EDV(MOD-SP2): 139.8 ML
BH CV ECHO MEAS - EDV(MOD-SP4): 156.8 ML
BH CV ECHO MEAS - EDV(TEICH): 104.8 ML
BH CV ECHO MEAS - EF(MOD-BP): 44 %
BH CV ECHO MEAS - EF(MOD-SP2): 47.8 %
BH CV ECHO MEAS - EF(MOD-SP4): 43.9 %
BH CV ECHO MEAS - ESV(MOD-SP2): 73 ML
BH CV ECHO MEAS - ESV(MOD-SP4): 88 ML
BH CV ECHO MEAS - IVS/LVPW: 1.1
BH CV ECHO MEAS - IVSD: 1.4 CM
BH CV ECHO MEAS - LA DIMENSION(2D): 4.3 CM
BH CV ECHO MEAS - LV DIASTOLIC VOL/BSA (35-75): 87.9 ML/M^2
BH CV ECHO MEAS - LV MASS(C)D: 258.8 GRAMS
BH CV ECHO MEAS - LV MASS(C)DI: 145.2 GRAMS/M^2
BH CV ECHO MEAS - LV MAX PG: 4.1 MMHG
BH CV ECHO MEAS - LV MEAN PG: 2.2 MMHG
BH CV ECHO MEAS - LV SYSTOLIC VOL/BSA (12-30): 49.3 ML/M^2
BH CV ECHO MEAS - LV V1 MAX: 100.9 CM/SEC
BH CV ECHO MEAS - LV V1 MEAN: 70.2 CM/SEC
BH CV ECHO MEAS - LV V1 VTI: 22.4 CM
BH CV ECHO MEAS - LVIDD: 4.7 CM
BH CV ECHO MEAS - LVOT AREA: 3.6 CM^2
BH CV ECHO MEAS - LVOT DIAM: 2.1 CM
BH CV ECHO MEAS - LVPWD: 1.3 CM
BH CV ECHO MEAS - MV A MAX VEL: 102 CM/SEC
BH CV ECHO MEAS - MV DEC SLOPE: 464.7 CM/SEC^2
BH CV ECHO MEAS - MV DEC TIME: 0.2 SEC
BH CV ECHO MEAS - MV E MAX VEL: 48.7 CM/SEC
BH CV ECHO MEAS - MV E/A: 0.48
BH CV ECHO MEAS - MV MAX PG: 4.9 MMHG
BH CV ECHO MEAS - MV MEAN PG: 2 MMHG
BH CV ECHO MEAS - MV V2 MAX: 110.9 CM/SEC
BH CV ECHO MEAS - MV V2 MEAN: 66.4 CM/SEC
BH CV ECHO MEAS - MV V2 VTI: 34.6 CM
BH CV ECHO MEAS - MVA(VTI): 2.3 CM^2
BH CV ECHO MEAS - PA ACC TIME: 0.13 SEC
BH CV ECHO MEAS - PA MAX PG (FULL): 1.6 MMHG
BH CV ECHO MEAS - PA MAX PG: 3.4 MMHG
BH CV ECHO MEAS - PA PR(ACCEL): 20.9 MMHG
BH CV ECHO MEAS - PA V2 MAX: 92 CM/SEC
BH CV ECHO MEAS - PI END-D VEL: 146.2 CM/SEC
BH CV ECHO MEAS - PI MAX PG: 13.8 MMHG
BH CV ECHO MEAS - PI MAX VEL: 185.9 CM/SEC
BH CV ECHO MEAS - PULM DIAS VEL: 67.3 CM/SEC
BH CV ECHO MEAS - PULM S/D: 0.91
BH CV ECHO MEAS - PULM SYS VEL: 61.4 CM/SEC
BH CV ECHO MEAS - RAP SYSTOLE: 8 MMHG
BH CV ECHO MEAS - RV MAX PG: 1.8 MMHG
BH CV ECHO MEAS - RV MEAN PG: 1.2 MMHG
BH CV ECHO MEAS - RV V1 MAX: 66.7 CM/SEC
BH CV ECHO MEAS - RV V1 MEAN: 53.6 CM/SEC
BH CV ECHO MEAS - RV V1 VTI: 13.9 CM
BH CV ECHO MEAS - RVDD: 2.6 CM
BH CV ECHO MEAS - RVSP: 30.2 MMHG
BH CV ECHO MEAS - SI(AO): 165.6 ML/M^2
BH CV ECHO MEAS - SI(LVOT): 44.7 ML/M^2
BH CV ECHO MEAS - SI(MOD-SP2): 37.4 ML/M^2
BH CV ECHO MEAS - SI(MOD-SP4): 38.6 ML/M^2
BH CV ECHO MEAS - SV(AO): 295.3 ML
BH CV ECHO MEAS - SV(LVOT): 79.7 ML
BH CV ECHO MEAS - SV(MOD-SP2): 66.7 ML
BH CV ECHO MEAS - SV(MOD-SP4): 68.8 ML
BH CV ECHO MEAS - TR MAX VEL: 235.8 CM/SEC
BILIRUB SERPL-MCNC: 0.3 MG/DL (ref 0.2–1.2)
BILIRUB SERPL-MCNC: 0.4 MG/DL (ref 0–1.2)
BILIRUB SERPL-MCNC: 0.5 MG/DL (ref 0–1.2)
BILIRUB SERPL-MCNC: 0.6 MG/DL (ref 0–1.2)
BILIRUB SERPL-MCNC: 0.6 MG/DL (ref 0–1.2)
BILIRUB SERPL-MCNC: 0.7 MG/DL (ref 0–1.2)
BILIRUB SERPL-MCNC: 0.7 MG/DL (ref 0–1.2)
BILIRUB SERPL-MCNC: 0.8 MG/DL (ref 0–1.2)
BILIRUB SERPL-MCNC: 0.9 MG/DL (ref 0–1.2)
BILIRUB UR QL STRIP: ABNORMAL
BILIRUB UR QL STRIP: NEGATIVE
BOTTLE TYPE: ABNORMAL
BUN BLD-MCNC: 16 MG/DL (ref 8–23)
BUN BLD-MCNC: 19 MG/DL (ref 8–23)
BUN BLD-MCNC: 35 MG/DL (ref 8–23)
BUN SERPL-MCNC: 106 MG/DL (ref 8–23)
BUN SERPL-MCNC: 108 MG/DL (ref 8–23)
BUN SERPL-MCNC: 19 MG/DL (ref 8–23)
BUN SERPL-MCNC: 20 MG/DL (ref 8–23)
BUN SERPL-MCNC: 24 MG/DL (ref 8–23)
BUN SERPL-MCNC: 35 MG/DL (ref 8–23)
BUN SERPL-MCNC: 39 MG/DL (ref 8–23)
BUN SERPL-MCNC: 51 MG/DL (ref 8–23)
BUN SERPL-MCNC: 63 MG/DL (ref 8–23)
BUN SERPL-MCNC: 66 MG/DL (ref 8–23)
BUN SERPL-MCNC: 71 MG/DL (ref 8–23)
BUN SERPL-MCNC: 86 MG/DL (ref 8–23)
BUN SERPL-MCNC: 94 MG/DL (ref 8–23)
BUN SERPL-MCNC: 95 MG/DL (ref 8–23)
BUN SERPL-MCNC: 98 MG/DL (ref 8–23)
BUN SERPL-MCNC: ABNORMAL MG/DL
BUN/CREAT SERPL: 17 (ref 7–25)
BUN/CREAT SERPL: 22.1 (ref 7–25)
BUN/CREAT SERPL: 25.5 (ref 7–25)
BUN/CREAT SERPL: ABNORMAL
BURR CELLS BLD QL SMEAR: ABNORMAL
C PNEUM DNA NPH QL NAA+NON-PROBE: NOT DETECTED
C PNEUM DNA NPH QL NAA+NON-PROBE: NOT DETECTED
CA-I BLDA-SCNC: 0.89 MMOL/L (ref 1.15–1.33)
CA-I BLDA-SCNC: 0.91 MMOL/L (ref 1.15–1.33)
CALCIUM SPEC-SCNC: 8.3 MG/DL (ref 8.6–10.5)
CALCIUM SPEC-SCNC: 8.4 MG/DL (ref 8.6–10.5)
CALCIUM SPEC-SCNC: 8.7 MG/DL (ref 8.6–10.5)
CALCIUM SPEC-SCNC: 8.8 MG/DL (ref 8.6–10.5)
CALCIUM SPEC-SCNC: 8.8 MG/DL (ref 8.6–10.5)
CALCIUM SPEC-SCNC: 8.9 MG/DL (ref 8.6–10.5)
CALCIUM SPEC-SCNC: 9 MG/DL (ref 8.6–10.5)
CALCIUM SPEC-SCNC: 9 MG/DL (ref 8.6–10.5)
CALCIUM SPEC-SCNC: 9.1 MG/DL (ref 8.6–10.5)
CALCIUM SPEC-SCNC: 9.2 MG/DL (ref 8.6–10.5)
CALCIUM SPEC-SCNC: 9.3 MG/DL (ref 8.6–10.5)
CALCIUM SPEC-SCNC: 9.4 MG/DL (ref 8.6–10.5)
CALCIUM SPEC-SCNC: 9.5 MG/DL (ref 8.6–10.5)
CALCIUM SPEC-SCNC: 9.5 MG/DL (ref 8.6–10.5)
CANNABINOIDS SERPL QL: NEGATIVE
CERULOPLASMIN SERPL-MCNC: 33 MG/DL (ref 16–31)
CHLORIDE SERPL-SCNC: 100 MMOL/L (ref 98–107)
CHLORIDE SERPL-SCNC: 102 MMOL/L (ref 98–107)
CHLORIDE SERPL-SCNC: 102 MMOL/L (ref 98–107)
CHLORIDE SERPL-SCNC: 103 MMOL/L (ref 98–107)
CHLORIDE SERPL-SCNC: 87 MMOL/L (ref 98–107)
CHLORIDE SERPL-SCNC: 91 MMOL/L (ref 98–107)
CHLORIDE SERPL-SCNC: 92 MMOL/L (ref 98–107)
CHLORIDE SERPL-SCNC: 93 MMOL/L (ref 98–107)
CHLORIDE SERPL-SCNC: 94 MMOL/L (ref 98–107)
CHLORIDE SERPL-SCNC: 95 MMOL/L (ref 98–107)
CHLORIDE SERPL-SCNC: 96 MMOL/L (ref 98–107)
CHLORIDE SERPL-SCNC: 98 MMOL/L (ref 98–107)
CHLORIDE SERPL-SCNC: 98 MMOL/L (ref 98–107)
CHLORIDE SERPL-SCNC: 99 MMOL/L (ref 98–107)
CHOLEST SERPL-MCNC: 125 MG/DL (ref 0–200)
CLARITY UR: ABNORMAL
CLARITY UR: CLEAR
CO2 BLDA-SCNC: 24.4 MMOL/L (ref 22–29)
CO2 BLDA-SCNC: 25.2 MMOL/L (ref 22–29)
CO2 BLDA-SCNC: 27.7 MMOL/L (ref 22–29)
CO2 BLDA-SCNC: 32.4 MMOL/L (ref 22–29)
CO2 BLDA-SCNC: 32.8 MMOL/L (ref 22–29)
CO2 BLDA-SCNC: 38.6 MMOL/L (ref 22–29)
CO2 BLDA-SCNC: 39.2 MMOL/L (ref 22–29)
CO2 BLDA-SCNC: 40.4 MMOL/L (ref 22–29)
CO2 BLDA-SCNC: 41.9 MMOL/L (ref 22–29)
CO2 BLDA-SCNC: 42 MMOL/L (ref 22–29)
CO2 BLDA-SCNC: 45.1 MMOL/L (ref 22–29)
CO2 BLDA-SCNC: 49 MMOL/L (ref 22–29)
CO2 SERPL-SCNC: 19 MMOL/L (ref 22–29)
CO2 SERPL-SCNC: 20 MMOL/L (ref 22–29)
CO2 SERPL-SCNC: 23 MMOL/L (ref 22–29)
CO2 SERPL-SCNC: 23 MMOL/L (ref 22–29)
CO2 SERPL-SCNC: 24 MMOL/L (ref 22–29)
CO2 SERPL-SCNC: 24 MMOL/L (ref 22–29)
CO2 SERPL-SCNC: 26 MMOL/L (ref 22–29)
CO2 SERPL-SCNC: 27 MMOL/L (ref 22–29)
CO2 SERPL-SCNC: 31 MMOL/L (ref 22–29)
CO2 SERPL-SCNC: 33 MMOL/L (ref 22–29)
CO2 SERPL-SCNC: 34 MMOL/L (ref 22–29)
CO2 SERPL-SCNC: 37 MMOL/L (ref 22–29)
CO2 SERPL-SCNC: 38 MMOL/L (ref 22–29)
CO2 SERPL-SCNC: 41 MMOL/L (ref 22–29)
COCAINE UR QL: NEGATIVE
COLOR UR: ABNORMAL
COLOR UR: YELLOW
CREAT BLD-MCNC: 0.86 MG/DL (ref 0.76–1.27)
CREAT BLD-MCNC: 0.94 MG/DL (ref 0.76–1.27)
CREAT BLD-MCNC: 1.37 MG/DL (ref 0.76–1.27)
CREAT SERPL-MCNC: 0.78 MG/DL (ref 0.76–1.27)
CREAT SERPL-MCNC: 0.85 MG/DL (ref 0.76–1.27)
CREAT SERPL-MCNC: 1.13 MG/DL (ref 0.76–1.27)
CREAT SERPL-MCNC: 1.25 MG/DL (ref 0.76–1.27)
CREAT SERPL-MCNC: 1.3 MG/DL (ref 0.76–1.27)
CREAT SERPL-MCNC: 2.99 MG/DL (ref 0.76–1.27)
CREAT SERPL-MCNC: 3.3 MG/DL (ref 0.76–1.27)
CREAT SERPL-MCNC: 3.82 MG/DL (ref 0.76–1.27)
CREAT SERPL-MCNC: 4.45 MG/DL (ref 0.76–1.27)
CREAT SERPL-MCNC: 4.52 MG/DL (ref 0.76–1.27)
CREAT SERPL-MCNC: 4.6 MG/DL (ref 0.76–1.27)
CREAT SERPL-MCNC: 4.62 MG/DL (ref 0.76–1.27)
CREAT SERPL-MCNC: 5.02 MG/DL (ref 0.76–1.27)
CREAT SERPL-MCNC: 5.24 MG/DL (ref 0.76–1.27)
CREAT SERPL-MCNC: 5.4 MG/DL (ref 0.76–1.27)
CREAT UR-MCNC: 92.6 MG/DL
D-LACTATE SERPL-SCNC: 10.4 MMOL/L (ref 0.5–2)
D-LACTATE SERPL-SCNC: 2 MMOL/L (ref 0.5–2)
D-LACTATE SERPL-SCNC: 3.2 MMOL/L (ref 0.5–2)
D-LACTATE SERPL-SCNC: 4.8 MMOL/L (ref 0.5–2)
D-LACTATE SERPL-SCNC: 8.8 MMOL/L (ref 0.5–2)
DEPRECATED MITOCHONDRIA M2 IGG SER-ACNC: <20 UNITS (ref 0–20)
DEPRECATED RDW RBC AUTO: 46.8 FL (ref 37–54)
DEPRECATED RDW RBC AUTO: 47.7 FL (ref 37–54)
DEPRECATED RDW RBC AUTO: 49 FL (ref 37–54)
DEPRECATED RDW RBC AUTO: 49 FL (ref 37–54)
DEPRECATED RDW RBC AUTO: 49.4 FL (ref 37–54)
DEPRECATED RDW RBC AUTO: 49.4 FL (ref 37–54)
DEPRECATED RDW RBC AUTO: 50.3 FL (ref 37–54)
DEPRECATED RDW RBC AUTO: 50.8 FL (ref 37–54)
DEPRECATED RDW RBC AUTO: 51.2 FL (ref 37–54)
DEPRECATED RDW RBC AUTO: 52.9 FL (ref 37–54)
DEPRECATED RDW RBC AUTO: 53.4 FL (ref 37–54)
EOSINOPHIL # BLD AUTO: 0 10*3/MM3 (ref 0–0.4)
EOSINOPHIL # BLD AUTO: 0.1 10*3/MM3 (ref 0–0.4)
EOSINOPHIL # BLD MANUAL: 0.15 10*3/MM3 (ref 0–0.4)
EOSINOPHIL NFR BLD AUTO: 0 % (ref 0.3–6.2)
EOSINOPHIL NFR BLD AUTO: 0 % (ref 0.3–6.2)
EOSINOPHIL NFR BLD AUTO: 0.1 % (ref 0.3–6.2)
EOSINOPHIL NFR BLD AUTO: 0.7 % (ref 0.3–6.2)
EOSINOPHIL NFR BLD AUTO: 1.3 % (ref 0.3–6.2)
EOSINOPHIL NFR BLD MANUAL: 1 % (ref 0.3–6.2)
ERYTHROCYTE [DISTWIDTH] IN BLOOD BY AUTOMATED COUNT: 14.9 % (ref 12.3–15.4)
ERYTHROCYTE [DISTWIDTH] IN BLOOD BY AUTOMATED COUNT: 15 % (ref 12.3–15.4)
ERYTHROCYTE [DISTWIDTH] IN BLOOD BY AUTOMATED COUNT: 15 % (ref 12.3–15.4)
ERYTHROCYTE [DISTWIDTH] IN BLOOD BY AUTOMATED COUNT: 15.1 % (ref 12.3–15.4)
ERYTHROCYTE [DISTWIDTH] IN BLOOD BY AUTOMATED COUNT: 15.4 % (ref 12.3–15.4)
ERYTHROCYTE [DISTWIDTH] IN BLOOD BY AUTOMATED COUNT: 15.8 % (ref 12.3–15.4)
ERYTHROCYTE [DISTWIDTH] IN BLOOD BY AUTOMATED COUNT: 15.8 % (ref 12.3–15.4)
ERYTHROCYTE [DISTWIDTH] IN BLOOD BY AUTOMATED COUNT: 15.9 % (ref 12.3–15.4)
ERYTHROCYTE [DISTWIDTH] IN BLOOD BY AUTOMATED COUNT: 16 % (ref 12.3–15.4)
ERYTHROCYTE [DISTWIDTH] IN BLOOD BY AUTOMATED COUNT: 16.1 % (ref 12.3–15.4)
ERYTHROCYTE [DISTWIDTH] IN BLOOD BY AUTOMATED COUNT: 16.1 % (ref 12.3–15.4)
ERYTHROCYTE [DISTWIDTH] IN BLOOD BY AUTOMATED COUNT: 16.3 % (ref 12.3–15.4)
ERYTHROCYTE [DISTWIDTH] IN BLOOD BY AUTOMATED COUNT: 16.5 % (ref 12.3–15.4)
ERYTHROCYTE [DISTWIDTH] IN BLOOD BY AUTOMATED COUNT: 16.6 % (ref 12.3–15.4)
ERYTHROCYTE [DISTWIDTH] IN BLOOD BY AUTOMATED COUNT: 16.6 % (ref 12.3–15.4)
FERRITIN SERPL-MCNC: 2880 NG/ML (ref 30–400)
FLUAV H1 2009 PAND RNA NPH QL NAA+PROBE: NOT DETECTED
FLUAV H1 2009 PAND RNA NPH QL NAA+PROBE: NOT DETECTED
FLUAV H1 HA GENE NPH QL NAA+PROBE: NOT DETECTED
FLUAV H1 HA GENE NPH QL NAA+PROBE: NOT DETECTED
FLUAV H3 RNA NPH QL NAA+PROBE: NOT DETECTED
FLUAV H3 RNA NPH QL NAA+PROBE: NOT DETECTED
FLUAV SUBTYP SPEC NAA+PROBE: NOT DETECTED
FLUAV SUBTYP SPEC NAA+PROBE: NOT DETECTED
FLUBV RNA ISLT QL NAA+PROBE: NOT DETECTED
FLUBV RNA ISLT QL NAA+PROBE: NOT DETECTED
GFR SERPL CREATININE-BSD FRML MDRD: 10 ML/MIN/1.73
GFR SERPL CREATININE-BSD FRML MDRD: 11 ML/MIN/1.73
GFR SERPL CREATININE-BSD FRML MDRD: 11 ML/MIN/1.73
GFR SERPL CREATININE-BSD FRML MDRD: 12 ML/MIN/1.73
GFR SERPL CREATININE-BSD FRML MDRD: 12 ML/MIN/1.73
GFR SERPL CREATININE-BSD FRML MDRD: 13 ML/MIN/1.73
GFR SERPL CREATININE-BSD FRML MDRD: 13 ML/MIN/1.73
GFR SERPL CREATININE-BSD FRML MDRD: 15 ML/MIN/1.73
GFR SERPL CREATININE-BSD FRML MDRD: 18 ML/MIN/1.73
GFR SERPL CREATININE-BSD FRML MDRD: 21 ML/MIN/1.73
GFR SERPL CREATININE-BSD FRML MDRD: 51 ML/MIN/1.73
GFR SERPL CREATININE-BSD FRML MDRD: 54 ML/MIN/1.73
GFR SERPL CREATININE-BSD FRML MDRD: 56 ML/MIN/1.73
GFR SERPL CREATININE-BSD FRML MDRD: 63 ML/MIN/1.73
GFR SERPL CREATININE-BSD FRML MDRD: 78 ML/MIN/1.73
GFR SERPL CREATININE-BSD FRML MDRD: 87 ML/MIN/1.73
GFR SERPL CREATININE-BSD FRML MDRD: 88 ML/MIN/1.73
GFR SERPL CREATININE-BSD FRML MDRD: 97 ML/MIN/1.73
GFR SERPL CREATININE-BSD FRML MDRD: ABNORMAL ML/MIN/{1.73_M2}
GGT SERPL-CCNC: 36 U/L (ref 8–61)
GLOBULIN UR ELPH-MCNC: 2.8 GM/DL
GLOBULIN UR ELPH-MCNC: 3.3 GM/DL
GLOBULIN UR ELPH-MCNC: 3.4 GM/DL
GLOBULIN UR ELPH-MCNC: 3.7 GM/DL
GLOBULIN UR ELPH-MCNC: 3.8 GM/DL
GLOBULIN UR ELPH-MCNC: 3.9 GM/DL
GLOBULIN UR ELPH-MCNC: 3.9 GM/DL
GLUCOSE BLD-MCNC: 121 MG/DL (ref 65–99)
GLUCOSE BLD-MCNC: 137 MG/DL (ref 65–99)
GLUCOSE BLD-MCNC: 155 MG/DL (ref 65–99)
GLUCOSE BLDC GLUCOMTR-MCNC: 102 MG/DL (ref 70–105)
GLUCOSE BLDC GLUCOMTR-MCNC: 102 MG/DL (ref 70–105)
GLUCOSE BLDC GLUCOMTR-MCNC: 104 MG/DL (ref 74–100)
GLUCOSE BLDC GLUCOMTR-MCNC: 104 MG/DL (ref 74–100)
GLUCOSE BLDC GLUCOMTR-MCNC: 106 MG/DL (ref 70–105)
GLUCOSE BLDC GLUCOMTR-MCNC: 108 MG/DL (ref 70–105)
GLUCOSE BLDC GLUCOMTR-MCNC: 108 MG/DL (ref 70–105)
GLUCOSE BLDC GLUCOMTR-MCNC: 110 MG/DL (ref 70–105)
GLUCOSE BLDC GLUCOMTR-MCNC: 116 MG/DL (ref 70–105)
GLUCOSE BLDC GLUCOMTR-MCNC: 118 MG/DL (ref 70–105)
GLUCOSE BLDC GLUCOMTR-MCNC: 125 MG/DL (ref 70–105)
GLUCOSE BLDC GLUCOMTR-MCNC: 126 MG/DL (ref 70–105)
GLUCOSE BLDC GLUCOMTR-MCNC: 131 MG/DL (ref 70–105)
GLUCOSE BLDC GLUCOMTR-MCNC: 132 MG/DL (ref 70–105)
GLUCOSE BLDC GLUCOMTR-MCNC: 136 MG/DL (ref 70–105)
GLUCOSE BLDC GLUCOMTR-MCNC: 140 MG/DL (ref 70–105)
GLUCOSE BLDC GLUCOMTR-MCNC: 150 MG/DL (ref 70–105)
GLUCOSE BLDC GLUCOMTR-MCNC: 153 MG/DL (ref 70–105)
GLUCOSE BLDC GLUCOMTR-MCNC: 155 MG/DL (ref 70–105)
GLUCOSE BLDC GLUCOMTR-MCNC: 158 MG/DL (ref 70–105)
GLUCOSE BLDC GLUCOMTR-MCNC: 161 MG/DL (ref 70–105)
GLUCOSE BLDC GLUCOMTR-MCNC: 162 MG/DL (ref 70–105)
GLUCOSE BLDC GLUCOMTR-MCNC: 174 MG/DL (ref 70–105)
GLUCOSE BLDC GLUCOMTR-MCNC: 180 MG/DL (ref 70–105)
GLUCOSE BLDC GLUCOMTR-MCNC: 240 MG/DL (ref 70–105)
GLUCOSE BLDC GLUCOMTR-MCNC: 252 MG/DL (ref 70–105)
GLUCOSE BLDC GLUCOMTR-MCNC: 49 MG/DL (ref 70–105)
GLUCOSE BLDC GLUCOMTR-MCNC: 93 MG/DL (ref 74–100)
GLUCOSE BLDC GLUCOMTR-MCNC: 93 MG/DL (ref 74–100)
GLUCOSE BLDC GLUCOMTR-MCNC: 96 MG/DL (ref 70–105)
GLUCOSE BLDC GLUCOMTR-MCNC: 97 MG/DL (ref 70–105)
GLUCOSE SERPL-MCNC: 101 MG/DL (ref 65–99)
GLUCOSE SERPL-MCNC: 104 MG/DL (ref 65–99)
GLUCOSE SERPL-MCNC: 107 MG/DL (ref 65–99)
GLUCOSE SERPL-MCNC: 108 MG/DL (ref 65–99)
GLUCOSE SERPL-MCNC: 119 MG/DL (ref 65–99)
GLUCOSE SERPL-MCNC: 126 MG/DL (ref 65–99)
GLUCOSE SERPL-MCNC: 134 MG/DL (ref 65–99)
GLUCOSE SERPL-MCNC: 151 MG/DL (ref 65–99)
GLUCOSE SERPL-MCNC: 161 MG/DL (ref 65–99)
GLUCOSE SERPL-MCNC: 242 MG/DL (ref 65–99)
GLUCOSE SERPL-MCNC: 287 MG/DL (ref 65–99)
GLUCOSE SERPL-MCNC: 456 MG/DL (ref 65–99)
GLUCOSE SERPL-MCNC: 95 MG/DL (ref 65–99)
GLUCOSE UR STRIP-MCNC: NEGATIVE MG/DL
GLUCOSE UR STRIP-MCNC: NEGATIVE MG/DL
GRAM STN SPEC: ABNORMAL
HADV DNA SPEC NAA+PROBE: NOT DETECTED
HADV DNA SPEC NAA+PROBE: NOT DETECTED
HAV IGM SERPL QL IA: NORMAL
HBV CORE IGM SERPL QL IA: NORMAL
HBV SURFACE AG SERPL QL IA: NORMAL
HCO3 BLDA-SCNC: 23.4 MMOL/L (ref 21–28)
HCO3 BLDA-SCNC: 23.9 MMOL/L (ref 21–28)
HCO3 BLDA-SCNC: 25.4 MMOL/L (ref 21–28)
HCO3 BLDA-SCNC: 30.5 MMOL/L (ref 21–28)
HCO3 BLDA-SCNC: 31.3 MMOL/L (ref 21–28)
HCO3 BLDA-SCNC: 36.3 MMOL/L (ref 21–28)
HCO3 BLDA-SCNC: 36.8 MMOL/L (ref 21–28)
HCO3 BLDA-SCNC: 38.1 MMOL/L (ref 21–28)
HCO3 BLDA-SCNC: 39.9 MMOL/L (ref 21–28)
HCO3 BLDA-SCNC: 40 MMOL/L (ref 21–28)
HCO3 BLDA-SCNC: 43.6 MMOL/L (ref 21–28)
HCO3 BLDA-SCNC: 47.5 MMOL/L (ref 21–28)
HCOV 229E RNA SPEC QL NAA+PROBE: NOT DETECTED
HCOV 229E RNA SPEC QL NAA+PROBE: NOT DETECTED
HCOV HKU1 RNA SPEC QL NAA+PROBE: NOT DETECTED
HCOV HKU1 RNA SPEC QL NAA+PROBE: NOT DETECTED
HCOV NL63 RNA SPEC QL NAA+PROBE: NOT DETECTED
HCOV NL63 RNA SPEC QL NAA+PROBE: NOT DETECTED
HCOV OC43 RNA SPEC QL NAA+PROBE: NOT DETECTED
HCOV OC43 RNA SPEC QL NAA+PROBE: NOT DETECTED
HCT VFR BLD AUTO: 27.6 % (ref 37.5–51)
HCT VFR BLD AUTO: 29.7 % (ref 37.5–51)
HCT VFR BLD AUTO: 30.4 % (ref 37.5–51)
HCT VFR BLD AUTO: 31.3 % (ref 37.5–51)
HCT VFR BLD AUTO: 31.7 % (ref 37.5–51)
HCT VFR BLD AUTO: 31.9 % (ref 37.5–51)
HCT VFR BLD AUTO: 32.7 % (ref 37.5–51)
HCT VFR BLD AUTO: 33.4 % (ref 37.5–51)
HCT VFR BLD AUTO: 34.3 % (ref 37.5–51)
HCT VFR BLD AUTO: 34.5 % (ref 37.5–51)
HCT VFR BLD AUTO: 34.8 % (ref 37.5–51)
HCT VFR BLD AUTO: 34.9 % (ref 37.5–51)
HCT VFR BLD AUTO: 35.2 % (ref 37.5–51)
HCT VFR BLD AUTO: 35.5 % (ref 37.5–51)
HCT VFR BLD AUTO: 35.5 % (ref 37.5–51)
HCT VFR BLD AUTO: 38 % (ref 37.5–51)
HCT VFR BLDA CALC: 29 % (ref 38–51)
HCT VFR BLDA CALC: 41 % (ref 38–51)
HCV AB SER DONR QL: NORMAL
HDLC SERPL-MCNC: 52 MG/DL (ref 40–60)
HEMOCCULT STL QL IA: POSITIVE
HEMODILUTION: NO
HEMODILUTION: YES
HGB BLD-MCNC: 10.1 G/DL (ref 13–17.7)
HGB BLD-MCNC: 10.1 G/DL (ref 13–17.7)
HGB BLD-MCNC: 10.2 G/DL (ref 13–17.7)
HGB BLD-MCNC: 10.3 G/DL (ref 13–17.7)
HGB BLD-MCNC: 10.4 G/DL (ref 13–17.7)
HGB BLD-MCNC: 10.8 G/DL (ref 13–17.7)
HGB BLD-MCNC: 10.8 G/DL (ref 13–17.7)
HGB BLD-MCNC: 11 G/DL (ref 13–17.7)
HGB BLD-MCNC: 11.3 G/DL (ref 13–17.7)
HGB BLD-MCNC: 11.3 G/DL (ref 13–17.7)
HGB BLD-MCNC: 11.5 G/DL (ref 13–17.7)
HGB BLD-MCNC: 11.5 G/DL (ref 13–17.7)
HGB BLD-MCNC: 12 G/DL (ref 13–17.7)
HGB BLD-MCNC: 12 G/DL (ref 13–17.7)
HGB BLD-MCNC: 8.6 G/DL (ref 13–17.7)
HGB BLD-MCNC: 9.4 G/DL (ref 13–17.7)
HGB BLDA-MCNC: 13.8 G/DL (ref 12–17)
HGB BLDA-MCNC: 9.8 G/DL (ref 12–17)
HGB UR QL STRIP.AUTO: ABNORMAL
HGB UR QL STRIP.AUTO: ABNORMAL
HMPV RNA NPH QL NAA+NON-PROBE: NOT DETECTED
HMPV RNA NPH QL NAA+NON-PROBE: NOT DETECTED
HOLD SPECIMEN: NORMAL
HOROWITZ INDEX BLD+IHG-RTO: 32 %
HPIV1 RNA SPEC QL NAA+PROBE: NOT DETECTED
HPIV1 RNA SPEC QL NAA+PROBE: NOT DETECTED
HPIV2 RNA SPEC QL NAA+PROBE: NOT DETECTED
HPIV2 RNA SPEC QL NAA+PROBE: NOT DETECTED
HPIV3 RNA NPH QL NAA+PROBE: NOT DETECTED
HPIV3 RNA NPH QL NAA+PROBE: NOT DETECTED
HPIV4 P GENE NPH QL NAA+PROBE: NOT DETECTED
HPIV4 P GENE NPH QL NAA+PROBE: NOT DETECTED
HYALINE CASTS UR QL AUTO: ABNORMAL /LPF
HYALINE CASTS UR QL AUTO: ABNORMAL /LPF
HYPOCHROMIA BLD QL: ABNORMAL
INHALED O2 CONCENTRATION: 100 %
INHALED O2 CONCENTRATION: 30 %
INHALED O2 CONCENTRATION: 30 %
INHALED O2 CONCENTRATION: 40 %
INHALED O2 CONCENTRATION: 40 %
INHALED O2 CONCENTRATION: 50 %
INHALED O2 CONCENTRATION: <21 %
INHALED O2 CONCENTRATION: <21 %
INR PPP: 1.07 (ref 0.93–1.1)
INR PPP: 1.08 (ref 0.93–1.1)
INR PPP: 1.11 (ref 0.9–1.1)
IRON 24H UR-MRATE: 48 MCG/DL (ref 59–158)
ISOLATED FROM: ABNORMAL
KETONES UR QL STRIP: NEGATIVE
KETONES UR QL STRIP: NEGATIVE
L PNEUMO1 AG UR QL IA: NEGATIVE
LACTATE HOLD SPECIMEN: NORMAL
LARGE PLATELETS: ABNORMAL
LDH SERPL-CCNC: 1059 U/L (ref 135–225)
LDLC SERPL CALC-MCNC: 61 MG/DL (ref 0–100)
LDLC/HDLC SERPL: 1.17 {RATIO}
LEUKOCYTE ESTERASE UR QL STRIP.AUTO: ABNORMAL
LEUKOCYTE ESTERASE UR QL STRIP.AUTO: NEGATIVE
LYMPHOCYTES # BLD AUTO: 0.2 10*3/MM3 (ref 0.7–3.1)
LYMPHOCYTES # BLD AUTO: 0.4 10*3/MM3 (ref 0.7–3.1)
LYMPHOCYTES # BLD AUTO: 0.6 10*3/MM3 (ref 0.7–3.1)
LYMPHOCYTES # BLD AUTO: 0.6 10*3/MM3 (ref 0.7–3.1)
LYMPHOCYTES # BLD AUTO: 0.8 10*3/MM3 (ref 0.7–3.1)
LYMPHOCYTES # BLD MANUAL: 0.15 10*3/MM3 (ref 0.7–3.1)
LYMPHOCYTES # BLD MANUAL: 0.19 10*3/MM3 (ref 0.7–3.1)
LYMPHOCYTES # BLD MANUAL: 0.31 10*3/MM3 (ref 0.7–3.1)
LYMPHOCYTES # BLD MANUAL: 0.38 10*3/MM3 (ref 0.7–3.1)
LYMPHOCYTES # BLD MANUAL: 0.47 10*3/MM3 (ref 0.7–3.1)
LYMPHOCYTES # BLD MANUAL: 0.53 10*3/MM3 (ref 0.7–3.1)
LYMPHOCYTES # BLD MANUAL: 0.83 10*3/MM3 (ref 0.7–3.1)
LYMPHOCYTES # BLD MANUAL: 0.94 10*3/MM3 (ref 0.7–3.1)
LYMPHOCYTES # BLD MANUAL: 0.95 10*3/MM3 (ref 0.7–3.1)
LYMPHOCYTES # BLD MANUAL: 1.55 10*3/MM3 (ref 0.7–3.1)
LYMPHOCYTES NFR BLD AUTO: 1.4 % (ref 19.6–45.3)
LYMPHOCYTES NFR BLD AUTO: 13.4 % (ref 19.6–45.3)
LYMPHOCYTES NFR BLD AUTO: 3.2 % (ref 19.6–45.3)
LYMPHOCYTES NFR BLD AUTO: 8.6 % (ref 19.6–45.3)
LYMPHOCYTES NFR BLD AUTO: 9.3 % (ref 19.6–45.3)
LYMPHOCYTES NFR BLD MANUAL: 1 % (ref 19.6–45.3)
LYMPHOCYTES NFR BLD MANUAL: 1 % (ref 19.6–45.3)
LYMPHOCYTES NFR BLD MANUAL: 1 % (ref 5–12)
LYMPHOCYTES NFR BLD MANUAL: 10 % (ref 5–12)
LYMPHOCYTES NFR BLD MANUAL: 2 % (ref 19.6–45.3)
LYMPHOCYTES NFR BLD MANUAL: 3 % (ref 19.6–45.3)
LYMPHOCYTES NFR BLD MANUAL: 3 % (ref 5–12)
LYMPHOCYTES NFR BLD MANUAL: 3 % (ref 5–12)
LYMPHOCYTES NFR BLD MANUAL: 4 % (ref 19.6–45.3)
LYMPHOCYTES NFR BLD MANUAL: 4 % (ref 5–12)
LYMPHOCYTES NFR BLD MANUAL: 5 % (ref 5–12)
LYMPHOCYTES NFR BLD MANUAL: 6 % (ref 19.6–45.3)
LYMPHOCYTES NFR BLD MANUAL: 6 % (ref 5–12)
LYMPHOCYTES NFR BLD MANUAL: 7 % (ref 19.6–45.3)
LYMPHOCYTES NFR BLD MANUAL: 7 % (ref 19.6–45.3)
LYMPHOCYTES NFR BLD MANUAL: 7 % (ref 5–12)
LYMPHOCYTES NFR BLD MANUAL: 8 % (ref 19.6–45.3)
LYMPHOCYTES NFR BLD MANUAL: 8 % (ref 5–12)
LYMPHOCYTES NFR BLD MANUAL: 9 % (ref 19.6–45.3)
LYMPHOCYTES NFR BLD MANUAL: 9 % (ref 5–12)
M PNEUMO IGG SER IA-ACNC: NOT DETECTED
M PNEUMO IGG SER IA-ACNC: NOT DETECTED
MAGNESIUM SERPL-MCNC: 1.6 MG/DL (ref 1.6–2.4)
MAGNESIUM SERPL-MCNC: 1.8 MG/DL (ref 1.6–2.4)
MAGNESIUM SERPL-MCNC: 2.1 MG/DL (ref 1.6–2.4)
MAGNESIUM SERPL-MCNC: 2.1 MG/DL (ref 1.6–2.4)
MAGNESIUM SERPL-MCNC: 2.2 MG/DL (ref 1.6–2.4)
MAGNESIUM SERPL-MCNC: 2.3 MG/DL (ref 1.6–2.4)
MAGNESIUM SERPL-MCNC: 2.4 MG/DL (ref 1.6–2.4)
MAGNESIUM SERPL-MCNC: 2.6 MG/DL (ref 1.6–2.4)
MAGNESIUM SERPL-MCNC: 2.7 MG/DL (ref 1.6–2.4)
MAGNESIUM SERPL-MCNC: 2.7 MG/DL (ref 1.6–2.4)
MAGNESIUM SERPL-MCNC: 2.8 MG/DL (ref 1.6–2.4)
MCH RBC QN AUTO: 27.5 PG (ref 26.6–33)
MCH RBC QN AUTO: 27.6 PG (ref 26.6–33)
MCH RBC QN AUTO: 28 PG (ref 26.6–33)
MCH RBC QN AUTO: 28.1 PG (ref 26.6–33)
MCH RBC QN AUTO: 28.1 PG (ref 26.6–33)
MCH RBC QN AUTO: 28.3 PG (ref 26.6–33)
MCH RBC QN AUTO: 28.5 PG (ref 26.6–33)
MCH RBC QN AUTO: 28.5 PG (ref 26.6–33)
MCH RBC QN AUTO: 28.6 PG (ref 26.6–33)
MCH RBC QN AUTO: 28.8 PG (ref 26.6–33)
MCH RBC QN AUTO: 30.4 PG (ref 26.6–33)
MCH RBC QN AUTO: 30.9 PG (ref 26.6–33)
MCHC RBC AUTO-ENTMCNC: 30.9 G/DL (ref 31.5–35.7)
MCHC RBC AUTO-ENTMCNC: 31.3 G/DL (ref 31.5–35.7)
MCHC RBC AUTO-ENTMCNC: 31.4 G/DL (ref 31.5–35.7)
MCHC RBC AUTO-ENTMCNC: 31.6 G/DL (ref 31.5–35.7)
MCHC RBC AUTO-ENTMCNC: 31.6 G/DL (ref 31.5–35.7)
MCHC RBC AUTO-ENTMCNC: 31.8 G/DL (ref 31.5–35.7)
MCHC RBC AUTO-ENTMCNC: 31.8 G/DL (ref 31.5–35.7)
MCHC RBC AUTO-ENTMCNC: 32 G/DL (ref 31.5–35.7)
MCHC RBC AUTO-ENTMCNC: 32.3 G/DL (ref 31.5–35.7)
MCHC RBC AUTO-ENTMCNC: 32.4 G/DL (ref 31.5–35.7)
MCHC RBC AUTO-ENTMCNC: 32.6 G/DL (ref 31.5–35.7)
MCHC RBC AUTO-ENTMCNC: 34.1 G/DL (ref 31.5–35.7)
MCHC RBC AUTO-ENTMCNC: 34.5 G/DL (ref 31.5–35.7)
MCV RBC AUTO: 86 FL (ref 79–97)
MCV RBC AUTO: 86.4 FL (ref 79–97)
MCV RBC AUTO: 87.5 FL (ref 79–97)
MCV RBC AUTO: 87.5 FL (ref 79–97)
MCV RBC AUTO: 87.7 FL (ref 79–97)
MCV RBC AUTO: 88.1 FL (ref 79–97)
MCV RBC AUTO: 88.4 FL (ref 79–97)
MCV RBC AUTO: 88.8 FL (ref 79–97)
MCV RBC AUTO: 89 FL (ref 79–97)
MCV RBC AUTO: 89.1 FL (ref 79–97)
MCV RBC AUTO: 89.3 FL (ref 79–97)
MCV RBC AUTO: 89.6 FL (ref 79–97)
MCV RBC AUTO: 90.5 FL (ref 79–97)
MCV RBC AUTO: 90.7 FL (ref 79–97)
MCV RBC AUTO: 92.1 FL (ref 79–97)
METHADONE UR QL SCN: NEGATIVE
MODALITY: ABNORMAL
MONOCYTES # BLD AUTO: 0.05 10*3/MM3 (ref 0.1–0.9)
MONOCYTES # BLD AUTO: 0.2 10*3/MM3 (ref 0.1–0.9)
MONOCYTES # BLD AUTO: 0.32 10*3/MM3 (ref 0.1–0.9)
MONOCYTES # BLD AUTO: 0.36 10*3/MM3 (ref 0.1–0.9)
MONOCYTES # BLD AUTO: 0.6 10*3/MM3 (ref 0.1–0.9)
MONOCYTES # BLD AUTO: 0.62 10*3/MM3 (ref 0.1–0.9)
MONOCYTES # BLD AUTO: 0.8 10*3/MM3 (ref 0.1–0.9)
MONOCYTES # BLD AUTO: 0.94 10*3/MM3 (ref 0.1–0.9)
MONOCYTES # BLD AUTO: 0.97 10*3/MM3 (ref 0.1–0.9)
MONOCYTES # BLD AUTO: 1 10*3/MM3 (ref 0.1–0.9)
MONOCYTES # BLD AUTO: 1.04 10*3/MM3 (ref 0.1–0.9)
MONOCYTES # BLD AUTO: 1.1 10*3/MM3 (ref 0.1–0.9)
MONOCYTES # BLD AUTO: 1.19 10*3/MM3 (ref 0.1–0.9)
MONOCYTES # BLD AUTO: 1.22 10*3/MM3 (ref 0.1–0.9)
MONOCYTES # BLD AUTO: 2.22 10*3/MM3 (ref 0.1–0.9)
MONOCYTES NFR BLD AUTO: 13.6 % (ref 5–12)
MONOCYTES NFR BLD AUTO: 2.8 % (ref 5–12)
MONOCYTES NFR BLD AUTO: 6.7 % (ref 5–12)
MONOCYTES NFR BLD AUTO: 8.2 % (ref 5–12)
MONOCYTES NFR BLD AUTO: 9.7 % (ref 5–12)
MRSA DNA SPEC QL NAA+PROBE: ABNORMAL
NEUTROPHILS # BLD AUTO: 10.71 10*3/MM3 (ref 1.7–7)
NEUTROPHILS # BLD AUTO: 11.48 10*3/MM3 (ref 1.7–7)
NEUTROPHILS # BLD AUTO: 13.62 10*3/MM3 (ref 1.7–7)
NEUTROPHILS # BLD AUTO: 13.71 10*3/MM3 (ref 1.7–7)
NEUTROPHILS # BLD AUTO: 13.73 10*3/MM3 (ref 1.7–7)
NEUTROPHILS # BLD AUTO: 14.42 10*3/MM3 (ref 1.7–7)
NEUTROPHILS # BLD AUTO: 18.24 10*3/MM3 (ref 1.7–7)
NEUTROPHILS # BLD AUTO: 18.43 10*3/MM3 (ref 1.7–7)
NEUTROPHILS # BLD AUTO: 4.28 10*3/MM3 (ref 1.7–7)
NEUTROPHILS # BLD AUTO: 5.8 10*3/MM3 (ref 1.7–7)
NEUTROPHILS # BLD AUTO: 9.33 10*3/MM3 (ref 1.7–7)
NEUTROPHILS NFR BLD AUTO: 12.3 10*3/MM3 (ref 1.7–7)
NEUTROPHILS NFR BLD AUTO: 14 10*3/MM3 (ref 1.7–7)
NEUTROPHILS NFR BLD AUTO: 4.3 10*3/MM3 (ref 1.7–7)
NEUTROPHILS NFR BLD AUTO: 5.1 10*3/MM3 (ref 1.7–7)
NEUTROPHILS NFR BLD AUTO: 71.4 % (ref 42.7–76)
NEUTROPHILS NFR BLD AUTO: 79.6 % (ref 42.7–76)
NEUTROPHILS NFR BLD AUTO: 88.1 % (ref 42.7–76)
NEUTROPHILS NFR BLD AUTO: 88.4 % (ref 42.7–76)
NEUTROPHILS NFR BLD AUTO: 91.4 % (ref 42.7–76)
NEUTROPHILS NFR BLD MANUAL: 75 % (ref 42.7–76)
NEUTROPHILS NFR BLD MANUAL: 76 % (ref 42.7–76)
NEUTROPHILS NFR BLD MANUAL: 76 % (ref 42.7–76)
NEUTROPHILS NFR BLD MANUAL: 78 % (ref 42.7–76)
NEUTROPHILS NFR BLD MANUAL: 81 % (ref 42.7–76)
NEUTROPHILS NFR BLD MANUAL: 82 % (ref 42.7–76)
NEUTROPHILS NFR BLD MANUAL: 87 % (ref 42.7–76)
NEUTROPHILS NFR BLD MANUAL: 89 % (ref 42.7–76)
NEUTROPHILS NFR BLD MANUAL: 91 % (ref 42.7–76)
NEUTROPHILS NFR BLD MANUAL: 94 % (ref 42.7–76)
NEUTS BAND NFR BLD MANUAL: 1 % (ref 0–5)
NEUTS BAND NFR BLD MANUAL: 11 % (ref 0–5)
NEUTS BAND NFR BLD MANUAL: 11 % (ref 0–5)
NEUTS BAND NFR BLD MANUAL: 12 % (ref 0–5)
NEUTS BAND NFR BLD MANUAL: 18 % (ref 0–5)
NEUTS BAND NFR BLD MANUAL: 6 % (ref 0–5)
NEUTS BAND NFR BLD MANUAL: 7 % (ref 0–5)
NITRITE UR QL STRIP: NEGATIVE
NITRITE UR QL STRIP: NEGATIVE
NRBC BLD AUTO-RTO: 0 /100 WBC (ref 0–0.2)
NRBC BLD AUTO-RTO: 0.1 /100 WBC (ref 0–0.2)
NRBC BLD AUTO-RTO: 0.1 /100 WBC (ref 0–0.2)
NT-PROBNP SERPL-MCNC: 6123 PG/ML (ref 5–1800)
NT-PROBNP SERPL-MCNC: 9541 PG/ML (ref 0–1800)
OPIATES UR QL: POSITIVE
OXYCODONE UR QL SCN: NEGATIVE
PAW @ PEAK INSP FLOW SETTING VENT: 12 CMH2O
PCO2 BLDA: 33.7 MM HG (ref 35–48)
PCO2 BLDA: 41.7 MM HG (ref 35–48)
PCO2 BLDA: 48.5 MM HG (ref 35–48)
PCO2 BLDA: 48.6 MM HG (ref 35–48)
PCO2 BLDA: 51.1 MM HG (ref 35–48)
PCO2 BLDA: 59.8 MM HG (ref 35–48)
PCO2 BLDA: 62.5 MM HG (ref 35–48)
PCO2 BLDA: 70.1 MM HG (ref 35–48)
PCO2 BLDA: 72.3 MM HG (ref 35–48)
PCO2 BLDA: 75.1 MM HG (ref 35–48)
PCO2 BLDA: 75.2 MM HG (ref 35–48)
PCO2 BLDA: 75.5 MM HG (ref 35–48)
PEEP RESPIRATORY: 15 CM[H2O]
PEEP RESPIRATORY: 5 CM[H2O]
PH BLDA: 7.14 PH UNITS (ref 7.35–7.45)
PH BLDA: 7.29 PH UNITS (ref 7.35–7.45)
PH BLDA: 7.3 PH UNITS (ref 7.35–7.45)
PH BLDA: 7.3 PH UNITS (ref 7.35–7.45)
PH BLDA: 7.33 PH UNITS (ref 7.35–7.45)
PH BLDA: 7.36 PH UNITS (ref 7.35–7.45)
PH BLDA: 7.37 PH UNITS (ref 7.35–7.45)
PH BLDA: 7.42 PH UNITS (ref 7.35–7.45)
PH BLDA: 7.43 PH UNITS (ref 7.35–7.45)
PH BLDA: 7.45 PH UNITS (ref 7.35–7.45)
PH BLDA: 7.56 PH UNITS (ref 7.35–7.45)
PH BLDA: 7.58 PH UNITS (ref 7.35–7.45)
PH UR STRIP.AUTO: 6.5 [PH] (ref 5–8)
PH UR STRIP.AUTO: 7.5 [PH] (ref 5–8)
PHOSPHATE SERPL-MCNC: 2.5 MG/DL (ref 2.5–4.5)
PHOSPHATE SERPL-MCNC: 3.1 MG/DL (ref 2.5–4.5)
PHOSPHATE SERPL-MCNC: 3.3 MG/DL (ref 2.5–4.5)
PHOSPHATE SERPL-MCNC: 3.6 MG/DL (ref 2.5–4.5)
PHOSPHATE SERPL-MCNC: 4 MG/DL (ref 2.5–4.5)
PHOSPHATE SERPL-MCNC: 4.2 MG/DL (ref 2.5–4.5)
PHOSPHATE SERPL-MCNC: 4.7 MG/DL (ref 2.5–4.5)
PHOSPHATE SERPL-MCNC: 5 MG/DL (ref 2.5–4.5)
PHOSPHATE SERPL-MCNC: 5.6 MG/DL (ref 2.5–4.5)
PHOSPHATE SERPL-MCNC: 5.7 MG/DL (ref 2.5–4.5)
PLAT MORPH BLD: NORMAL
PLATELET # BLD AUTO: 123 10*3/MM3 (ref 140–450)
PLATELET # BLD AUTO: 137 10*3/MM3 (ref 140–450)
PLATELET # BLD AUTO: 152 10*3/MM3 (ref 140–450)
PLATELET # BLD AUTO: 163 10*3/MM3 (ref 140–450)
PLATELET # BLD AUTO: 191 10*3/MM3 (ref 140–450)
PLATELET # BLD AUTO: 201 10*3/MM3 (ref 140–450)
PLATELET # BLD AUTO: 245 10*3/MM3 (ref 140–450)
PLATELET # BLD AUTO: 258 10*3/MM3 (ref 140–450)
PLATELET # BLD AUTO: 273 10*3/MM3 (ref 140–450)
PLATELET # BLD AUTO: 305 10*3/MM3 (ref 140–450)
PLATELET # BLD AUTO: 312 10*3/MM3 (ref 140–450)
PLATELET # BLD AUTO: 321 10*3/MM3 (ref 140–450)
PLATELET # BLD AUTO: 344 10*3/MM3 (ref 140–450)
PLATELET # BLD AUTO: 365 10*3/MM3 (ref 140–450)
PLATELET # BLD AUTO: 96 10*3/MM3 (ref 140–450)
PMV BLD AUTO: 6.6 FL (ref 6–12)
PMV BLD AUTO: 6.8 FL (ref 6–12)
PMV BLD AUTO: 6.8 FL (ref 6–12)
PMV BLD AUTO: 7 FL (ref 6–12)
PMV BLD AUTO: 7.1 FL (ref 6–12)
PMV BLD AUTO: 7.3 FL (ref 6–12)
PMV BLD AUTO: 7.4 FL (ref 6–12)
PMV BLD AUTO: 7.6 FL (ref 6–12)
PMV BLD AUTO: 8 FL (ref 6–12)
PMV BLD AUTO: 8.5 FL (ref 6–12)
PMV BLD AUTO: 8.6 FL (ref 6–12)
PMV BLD AUTO: 8.7 FL (ref 6–12)
PMV BLD AUTO: 8.8 FL (ref 6–12)
PMV BLD AUTO: 8.8 FL (ref 6–12)
PMV BLD AUTO: 9 FL (ref 6–12)
PO2 BLDA: 112.7 MM HG (ref 83–108)
PO2 BLDA: 135.7 MM HG (ref 83–108)
PO2 BLDA: 151.8 MM HG (ref 83–108)
PO2 BLDA: 190.4 MM HG (ref 83–108)
PO2 BLDA: 56.9 MM HG (ref 83–108)
PO2 BLDA: 59.8 MM HG (ref 83–108)
PO2 BLDA: 67 MM HG (ref 83–108)
PO2 BLDA: 69.1 MM HG (ref 83–108)
PO2 BLDA: 76.5 MM HG (ref 83–108)
PO2 BLDA: 81.2 MM HG (ref 83–108)
PO2 BLDA: 85.5 MM HG (ref 83–108)
PO2 BLDA: 90.1 MM HG (ref 83–108)
POIKILOCYTOSIS BLD QL SMEAR: ABNORMAL
POIKILOCYTOSIS BLD QL SMEAR: ABNORMAL
POTASSIUM BLD-SCNC: 3.9 MMOL/L (ref 3.5–5.2)
POTASSIUM BLD-SCNC: 4.5 MMOL/L (ref 3.5–5.2)
POTASSIUM BLD-SCNC: 4.7 MMOL/L (ref 3.5–5.2)
POTASSIUM BLDA-SCNC: 4.3 MMOL/L (ref 3.5–4.5)
POTASSIUM BLDA-SCNC: 6.3 MMOL/L (ref 3.5–4.5)
POTASSIUM SERPL-SCNC: 2.9 MMOL/L (ref 3.5–5.2)
POTASSIUM SERPL-SCNC: 3.1 MMOL/L (ref 3.5–5.2)
POTASSIUM SERPL-SCNC: 3.2 MMOL/L (ref 3.5–5.2)
POTASSIUM SERPL-SCNC: 3.3 MMOL/L (ref 3.5–5.2)
POTASSIUM SERPL-SCNC: 3.7 MMOL/L (ref 3.5–5.2)
POTASSIUM SERPL-SCNC: 3.7 MMOL/L (ref 3.5–5.2)
POTASSIUM SERPL-SCNC: 4.3 MMOL/L (ref 3.5–5.2)
POTASSIUM SERPL-SCNC: 4.4 MMOL/L (ref 3.5–5.2)
POTASSIUM SERPL-SCNC: 4.7 MMOL/L (ref 3.5–5.2)
POTASSIUM SERPL-SCNC: 4.8 MMOL/L (ref 3.5–5.2)
POTASSIUM SERPL-SCNC: 5.3 MMOL/L (ref 3.5–5.2)
POTASSIUM SERPL-SCNC: 5.4 MMOL/L (ref 3.5–5.2)
POTASSIUM SERPL-SCNC: 5.4 MMOL/L (ref 3.5–5.2)
POTASSIUM SERPL-SCNC: 5.7 MMOL/L (ref 3.5–5.2)
POTASSIUM SERPL-SCNC: 5.8 MMOL/L (ref 3.5–5.2)
POTASSIUM SERPL-SCNC: 5.8 MMOL/L (ref 3.5–5.2)
POTASSIUM SERPL-SCNC: 6.2 MMOL/L (ref 3.5–5.2)
PROCALCITONIN SERPL-MCNC: 29.65 NG/ML (ref 0–0.25)
PROCALCITONIN SERPL-MCNC: 6 NG/ML (ref 0–0.25)
PROT SERPL-MCNC: 5.2 G/DL (ref 6–8.5)
PROT SERPL-MCNC: 6 G/DL (ref 6–8.5)
PROT SERPL-MCNC: 6.1 G/DL (ref 6–8.5)
PROT SERPL-MCNC: 6.2 G/DL (ref 6–8.5)
PROT SERPL-MCNC: 6.4 G/DL (ref 6–8.5)
PROT SERPL-MCNC: 6.6 G/DL (ref 6–8.5)
PROT SERPL-MCNC: 6.8 G/DL (ref 6–8.5)
PROT SERPL-MCNC: 7.1 G/DL (ref 6–8.5)
PROT SERPL-MCNC: 7.2 G/DL (ref 6–8.5)
PROT SERPL-MCNC: 7.3 G/DL (ref 6–8.5)
PROT SERPL-MCNC: 7.6 G/DL (ref 6–8.5)
PROT SERPL-MCNC: 8.3 G/DL (ref 6–8.5)
PROT UR QL STRIP: ABNORMAL
PROT UR QL STRIP: NEGATIVE
PROT UR-MCNC: 86.3 MG/DL
PROTHROMBIN TIME: 11.5 SECONDS (ref 9.6–11.7)
PROTHROMBIN TIME: 11.6 SECONDS (ref 9.6–11.7)
PROTHROMBIN TIME: 11.7 SECONDS (ref 9.6–11.7)
PSV: 15 CMH2O
PSV: 5 CMH2O
RBC # BLD AUTO: 3.05 10*6/MM3 (ref 4.14–5.8)
RBC # BLD AUTO: 3.27 10*6/MM3 (ref 4.14–5.8)
RBC # BLD AUTO: 3.41 10*6/MM3 (ref 4.14–5.8)
RBC # BLD AUTO: 3.5 10*6/MM3 (ref 4.14–5.8)
RBC # BLD AUTO: 3.55 10*6/MM3 (ref 4.14–5.8)
RBC # BLD AUTO: 3.67 10*6/MM3 (ref 4.14–5.8)
RBC # BLD AUTO: 3.71 10*6/MM3 (ref 4.14–5.8)
RBC # BLD AUTO: 3.81 10*6/MM3 (ref 4.14–5.8)
RBC # BLD AUTO: 3.88 10*6/MM3 (ref 4.14–5.8)
RBC # BLD AUTO: 3.88 10*6/MM3 (ref 4.14–5.8)
RBC # BLD AUTO: 3.89 10*6/MM3 (ref 4.14–5.8)
RBC # BLD AUTO: 3.99 10*6/MM3 (ref 4.14–5.8)
RBC # BLD AUTO: 4.02 10*6/MM3 (ref 4.14–5.8)
RBC # BLD AUTO: 4.03 10*6/MM3 (ref 4.14–5.8)
RBC # BLD AUTO: 4.27 10*6/MM3 (ref 4.14–5.8)
RBC # UR: ABNORMAL /HPF
RBC # UR: ABNORMAL /HPF
RBC MORPH BLD: NORMAL
REF LAB TEST METHOD: ABNORMAL
REF LAB TEST METHOD: ABNORMAL
RESPIRATORY RATE: 16
RESPIRATORY RATE: 20
RESPIRATORY RATE: 20
RESPIRATORY RATE: 24
RESPIRATORY RATE: 26
RHINOVIRUS RNA SPEC NAA+PROBE: NOT DETECTED
RHINOVIRUS RNA SPEC NAA+PROBE: NOT DETECTED
RSV RNA NPH QL NAA+NON-PROBE: NOT DETECTED
RSV RNA NPH QL NAA+NON-PROBE: NOT DETECTED
S PNEUM AG SPEC QL LA: POSITIVE
SAO2 % BLDCOA: 88.1 % (ref 94–98)
SAO2 % BLDCOA: 88.9 % (ref 94–98)
SAO2 % BLDCOA: 89.3 % (ref 94–98)
SAO2 % BLDCOA: 89.6 % (ref 94–98)
SAO2 % BLDCOA: 93.6 % (ref 94–98)
SAO2 % BLDCOA: 96.7 % (ref 94–98)
SAO2 % BLDCOA: 97 % (ref 94–98)
SAO2 % BLDCOA: 97.2 % (ref 94–98)
SAO2 % BLDCOA: 97.5 % (ref 94–98)
SAO2 % BLDCOA: 99 % (ref 94–98)
SAO2 % BLDCOA: 99.4 % (ref 94–98)
SAO2 % BLDCOA: 99.5 % (ref 94–98)
SARS-COV-2 RNA PNL SPEC NAA+PROBE: NOT DETECTED
SCAN SLIDE: NORMAL
SMALL PLATELETS BLD QL SMEAR: ABNORMAL
SODIUM BLD-SCNC: 136 MMOL/L (ref 136–145)
SODIUM BLD-SCNC: 141 MMOL/L (ref 136–145)
SODIUM BLD-SCNC: 141 MMOL/L (ref 136–145)
SODIUM BLD-SCNC: 146 MMOL/L (ref 138–146)
SODIUM BLD-SCNC: 149 MMOL/L (ref 138–146)
SODIUM SERPL-SCNC: 135 MMOL/L (ref 136–145)
SODIUM SERPL-SCNC: 137 MMOL/L (ref 136–145)
SODIUM SERPL-SCNC: 138 MMOL/L (ref 136–145)
SODIUM SERPL-SCNC: 138 MMOL/L (ref 136–145)
SODIUM SERPL-SCNC: 139 MMOL/L (ref 136–145)
SODIUM SERPL-SCNC: 140 MMOL/L (ref 136–145)
SODIUM SERPL-SCNC: 140 MMOL/L (ref 136–145)
SODIUM SERPL-SCNC: 142 MMOL/L (ref 136–145)
SODIUM SERPL-SCNC: 146 MMOL/L (ref 136–145)
SODIUM SERPL-SCNC: 147 MMOL/L (ref 136–145)
SODIUM SERPL-SCNC: 149 MMOL/L (ref 136–145)
SODIUM SERPL-SCNC: 149 MMOL/L (ref 136–145)
SODIUM SERPL-SCNC: 151 MMOL/L (ref 136–145)
SODIUM UR-SCNC: <20 MMOL/L
SP GR UR STRIP: 1.01 (ref 1–1.03)
SP GR UR STRIP: 1.02 (ref 1–1.03)
SQUAMOUS #/AREA URNS HPF: ABNORMAL /HPF
SQUAMOUS #/AREA URNS HPF: ABNORMAL /HPF
TOXIC GRANULATION: ABNORMAL
TRIGL SERPL-MCNC: 62 MG/DL (ref 0–150)
TROPONIN T SERPL-MCNC: 0.02 NG/ML (ref 0–0.03)
TROPONIN T SERPL-MCNC: <0.01 NG/ML (ref 0–0.03)
TSH SERPL DL<=0.05 MIU/L-ACNC: 1.4 UIU/ML (ref 0.27–4.2)
UROBILINOGEN UR QL STRIP: ABNORMAL
UROBILINOGEN UR QL STRIP: ABNORMAL
VENTILATOR MODE: ABNORMAL
VLDLC SERPL-MCNC: 12.4 MG/DL
VT ON VENT VENT: 450 ML
VT ON VENT VENT: 500 ML
VT ON VENT VENT: 500 ML
WBC # BLD AUTO: 10.6 10*3/MM3 (ref 3.4–10.8)
WBC # BLD AUTO: 11.9 10*3/MM3 (ref 3.4–10.8)
WBC # BLD AUTO: 13.2 10*3/MM3 (ref 3.4–10.8)
WBC # BLD AUTO: 14 10*3/MM3 (ref 3.4–10.8)
WBC # BLD AUTO: 14.9 10*3/MM3 (ref 3.4–10.8)
WBC # BLD AUTO: 15.3 10*3/MM3 (ref 3.4–10.8)
WBC # BLD AUTO: 15.4 10*3/MM3 (ref 3.4–10.8)
WBC # BLD AUTO: 15.5 10*3/MM3 (ref 3.4–10.8)
WBC # BLD AUTO: 15.6 10*3/MM3 (ref 3.4–10.8)
WBC # BLD AUTO: 19.4 10*3/MM3 (ref 3.4–10.8)
WBC # BLD AUTO: 22.2 10*3/MM3 (ref 3.4–10.8)
WBC # BLD AUTO: 6 10*3/MM3 (ref 3.4–10.8)
WBC # BLD AUTO: 6.3 10*3/MM3 (ref 3.4–10.8)
WBC MORPH BLD: NORMAL
WBC NRBC COR # BLD: 4.7 10*3/MM3 (ref 3.4–10.8)
WBC NRBC COR # BLD: 6.5 10*3/MM3 (ref 3.4–10.8)
WBC UR QL AUTO: ABNORMAL /HPF
WBC UR QL AUTO: ABNORMAL /HPF
WHOLE BLOOD HOLD SPECIMEN: NORMAL

## 2020-01-01 PROCEDURE — 80051 ELECTROLYTE PANEL: CPT

## 2020-01-01 PROCEDURE — 82728 ASSAY OF FERRITIN: CPT | Performed by: NURSE PRACTITIONER

## 2020-01-01 PROCEDURE — 25010000002 FUROSEMIDE PER 20 MG: Performed by: INTERNAL MEDICINE

## 2020-01-01 PROCEDURE — 85025 COMPLETE CBC W/AUTO DIFF WBC: CPT | Performed by: EMERGENCY MEDICINE

## 2020-01-01 PROCEDURE — 63710000001 ONDANSETRON PER 8 MG: Performed by: NURSE PRACTITIONER

## 2020-01-01 PROCEDURE — 25010000002 LINEZOLID 600 MG/300ML SOLUTION: Performed by: INTERNAL MEDICINE

## 2020-01-01 PROCEDURE — 80053 COMPREHEN METABOLIC PANEL: CPT | Performed by: EMERGENCY MEDICINE

## 2020-01-01 PROCEDURE — 94660 CPAP INITIATION&MGMT: CPT

## 2020-01-01 PROCEDURE — 25010000002 KETOROLAC TROMETHAMINE PER 15 MG: Performed by: EMERGENCY MEDICINE

## 2020-01-01 PROCEDURE — 82803 BLOOD GASES ANY COMBINATION: CPT

## 2020-01-01 PROCEDURE — 25010000002 PIPERACILLIN SOD-TAZOBACTAM PER 1 G: Performed by: INTERNAL MEDICINE

## 2020-01-01 PROCEDURE — 25010000002 LORAZEPAM PER 2 MG

## 2020-01-01 PROCEDURE — 36600 WITHDRAWAL OF ARTERIAL BLOOD: CPT

## 2020-01-01 PROCEDURE — G0378 HOSPITAL OBSERVATION PER HR: HCPCS

## 2020-01-01 PROCEDURE — 76775 US EXAM ABDO BACK WALL LIM: CPT

## 2020-01-01 PROCEDURE — 85610 PROTHROMBIN TIME: CPT | Performed by: EMERGENCY MEDICINE

## 2020-01-01 PROCEDURE — 71045 X-RAY EXAM CHEST 1 VIEW: CPT

## 2020-01-01 PROCEDURE — 84156 ASSAY OF PROTEIN URINE: CPT | Performed by: INTERNAL MEDICINE

## 2020-01-01 PROCEDURE — 86376 MICROSOMAL ANTIBODY EACH: CPT | Performed by: NURSE PRACTITIONER

## 2020-01-01 PROCEDURE — 94799 UNLISTED PULMONARY SVC/PX: CPT

## 2020-01-01 PROCEDURE — 84100 ASSAY OF PHOSPHORUS: CPT | Performed by: NURSE PRACTITIONER

## 2020-01-01 PROCEDURE — 25010000002 METHYLPREDNISOLONE PER 40 MG: Performed by: INTERNAL MEDICINE

## 2020-01-01 PROCEDURE — 99221 1ST HOSP IP/OBS SF/LOW 40: CPT | Performed by: NURSE PRACTITIONER

## 2020-01-01 PROCEDURE — 74176 CT ABD & PELVIS W/O CONTRAST: CPT

## 2020-01-01 PROCEDURE — 85007 BL SMEAR W/DIFF WBC COUNT: CPT | Performed by: NURSE PRACTITIONER

## 2020-01-01 PROCEDURE — 81001 URINALYSIS AUTO W/SCOPE: CPT | Performed by: NURSE PRACTITIONER

## 2020-01-01 PROCEDURE — 83735 ASSAY OF MAGNESIUM: CPT | Performed by: NURSE PRACTITIONER

## 2020-01-01 PROCEDURE — 99232 SBSQ HOSP IP/OBS MODERATE 35: CPT | Performed by: INTERNAL MEDICINE

## 2020-01-01 PROCEDURE — 80048 BASIC METABOLIC PNL TOTAL CA: CPT | Performed by: EMERGENCY MEDICINE

## 2020-01-01 PROCEDURE — 84484 ASSAY OF TROPONIN QUANT: CPT | Performed by: NURSE PRACTITIONER

## 2020-01-01 PROCEDURE — 80053 COMPREHEN METABOLIC PANEL: CPT | Performed by: NURSE PRACTITIONER

## 2020-01-01 PROCEDURE — 63710000001 INSULIN REGULAR HUMAN PER 5 UNITS: Performed by: INTERNAL MEDICINE

## 2020-01-01 PROCEDURE — 82962 GLUCOSE BLOOD TEST: CPT

## 2020-01-01 PROCEDURE — 25010000002 HYDRALAZINE PER 20 MG: Performed by: NURSE PRACTITIONER

## 2020-01-01 PROCEDURE — 84520 ASSAY OF UREA NITROGEN: CPT | Performed by: INTERNAL MEDICINE

## 2020-01-01 PROCEDURE — C1750 CATH, HEMODIALYSIS,LONG-TERM: HCPCS

## 2020-01-01 PROCEDURE — 86038 ANTINUCLEAR ANTIBODIES: CPT | Performed by: NURSE PRACTITIONER

## 2020-01-01 PROCEDURE — 87040 BLOOD CULTURE FOR BACTERIA: CPT | Performed by: NURSE PRACTITIONER

## 2020-01-01 PROCEDURE — 99225 PR SBSQ OBSERVATION CARE/DAY 25 MINUTES: CPT | Performed by: INTERNAL MEDICINE

## 2020-01-01 PROCEDURE — 25010000002 HEPARIN (PORCINE) PER 1000 UNITS: Performed by: NURSE PRACTITIONER

## 2020-01-01 PROCEDURE — 70450 CT HEAD/BRAIN W/O DYE: CPT

## 2020-01-01 PROCEDURE — 93005 ELECTROCARDIOGRAM TRACING: CPT | Performed by: EMERGENCY MEDICINE

## 2020-01-01 PROCEDURE — 85025 COMPLETE CBC W/AUTO DIFF WBC: CPT | Performed by: NURSE PRACTITIONER

## 2020-01-01 PROCEDURE — 84145 PROCALCITONIN (PCT): CPT | Performed by: NURSE PRACTITIONER

## 2020-01-01 PROCEDURE — 97166 OT EVAL MOD COMPLEX 45 MIN: CPT

## 2020-01-01 PROCEDURE — 25010000002 FUROSEMIDE PER 20 MG: Performed by: NURSE PRACTITIONER

## 2020-01-01 PROCEDURE — 80048 BASIC METABOLIC PNL TOTAL CA: CPT | Performed by: NURSE PRACTITIONER

## 2020-01-01 PROCEDURE — 96376 TX/PRO/DX INJ SAME DRUG ADON: CPT

## 2020-01-01 PROCEDURE — 85018 HEMOGLOBIN: CPT

## 2020-01-01 PROCEDURE — 99231 SBSQ HOSP IP/OBS SF/LOW 25: CPT | Performed by: NURSE PRACTITIONER

## 2020-01-01 PROCEDURE — 83516 IMMUNOASSAY NONANTIBODY: CPT | Performed by: NURSE PRACTITIONER

## 2020-01-01 PROCEDURE — 25010000003 POTASSIUM CHLORIDE 10 MEQ/100ML SOLUTION: Performed by: NURSE PRACTITIONER

## 2020-01-01 PROCEDURE — 96375 TX/PRO/DX INJ NEW DRUG ADDON: CPT

## 2020-01-01 PROCEDURE — 87077 CULTURE AEROBIC IDENTIFY: CPT | Performed by: NURSE PRACTITIONER

## 2020-01-01 PROCEDURE — 83605 ASSAY OF LACTIC ACID: CPT

## 2020-01-01 PROCEDURE — 84484 ASSAY OF TROPONIN QUANT: CPT | Performed by: EMERGENCY MEDICINE

## 2020-01-01 PROCEDURE — 83735 ASSAY OF MAGNESIUM: CPT | Performed by: INTERNAL MEDICINE

## 2020-01-01 PROCEDURE — 83880 ASSAY OF NATRIURETIC PEPTIDE: CPT | Performed by: EMERGENCY MEDICINE

## 2020-01-01 PROCEDURE — 85610 PROTHROMBIN TIME: CPT | Performed by: INTERNAL MEDICINE

## 2020-01-01 PROCEDURE — 80307 DRUG TEST PRSMV CHEM ANLYZR: CPT | Performed by: NURSE PRACTITIONER

## 2020-01-01 PROCEDURE — 0100U HC BIOFIRE FILMARRAY RESP PANEL 2: CPT | Performed by: NURSE PRACTITIONER

## 2020-01-01 PROCEDURE — 25010000002 AMIODARONE PER 30 MG: Performed by: INTERNAL MEDICINE

## 2020-01-01 PROCEDURE — 94640 AIRWAY INHALATION TREATMENT: CPT

## 2020-01-01 PROCEDURE — 25010000002 PIPERACILLIN SOD-TAZOBACTAM PER 1 G: Performed by: NURSE PRACTITIONER

## 2020-01-01 PROCEDURE — 99222 1ST HOSP IP/OBS MODERATE 55: CPT | Performed by: INTERNAL MEDICINE

## 2020-01-01 PROCEDURE — 83605 ASSAY OF LACTIC ACID: CPT | Performed by: NURSE PRACTITIONER

## 2020-01-01 PROCEDURE — 87150 DNA/RNA AMPLIFIED PROBE: CPT | Performed by: NURSE PRACTITIONER

## 2020-01-01 PROCEDURE — 25010000002 EPINEPHRINE 1 MG/10ML SOLUTION PREFILLED SYRINGE: Performed by: INTERNAL MEDICINE

## 2020-01-01 PROCEDURE — 85025 COMPLETE CBC W/AUTO DIFF WBC: CPT | Performed by: INTERNAL MEDICINE

## 2020-01-01 PROCEDURE — 0099U HC BIOFIRE FILMARRAY RESP PANEL 1: CPT | Performed by: EMERGENCY MEDICINE

## 2020-01-01 PROCEDURE — 70551 MRI BRAIN STEM W/O DYE: CPT

## 2020-01-01 PROCEDURE — 93010 ELECTROCARDIOGRAM REPORT: CPT | Performed by: INTERNAL MEDICINE

## 2020-01-01 PROCEDURE — 25010000002 METHYLPREDNISOLONE PER 40 MG: Performed by: NURSE PRACTITIONER

## 2020-01-01 PROCEDURE — 25010000002 CEFEPIME PER 500 MG: Performed by: NURSE PRACTITIONER

## 2020-01-01 PROCEDURE — 25010000002 CALCIUM GLUCONATE-NACL 1-0.675 GM/50ML-% SOLUTION: Performed by: INTERNAL MEDICINE

## 2020-01-01 PROCEDURE — 99496 TRANSJ CARE MGMT HIGH F2F 7D: CPT | Performed by: FAMILY MEDICINE

## 2020-01-01 PROCEDURE — 76705 ECHO EXAM OF ABDOMEN: CPT

## 2020-01-01 PROCEDURE — 85610 PROTHROMBIN TIME: CPT | Performed by: NURSE PRACTITIONER

## 2020-01-01 PROCEDURE — 5A12012 PERFORMANCE OF CARDIAC OUTPUT, SINGLE, MANUAL: ICD-10-PCS | Performed by: INTERNAL MEDICINE

## 2020-01-01 PROCEDURE — 85014 HEMATOCRIT: CPT | Performed by: NURSE PRACTITIONER

## 2020-01-01 PROCEDURE — 25010000002 HEPARIN (PORCINE) PER 1000 UNITS: Performed by: INTERNAL MEDICINE

## 2020-01-01 PROCEDURE — 25010000002 CEFEPIME PER 500 MG: Performed by: INTERNAL MEDICINE

## 2020-01-01 PROCEDURE — 74018 RADEX ABDOMEN 1 VIEW: CPT

## 2020-01-01 PROCEDURE — C1751 CATH, INF, PER/CENT/MIDLINE: HCPCS

## 2020-01-01 PROCEDURE — 84300 ASSAY OF URINE SODIUM: CPT | Performed by: INTERNAL MEDICINE

## 2020-01-01 PROCEDURE — 25010000002 CHLOROTHIAZIDE PER 500 MG: Performed by: NURSE PRACTITIONER

## 2020-01-01 PROCEDURE — 82330 ASSAY OF CALCIUM: CPT

## 2020-01-01 PROCEDURE — 83540 ASSAY OF IRON: CPT | Performed by: NURSE PRACTITIONER

## 2020-01-01 PROCEDURE — 02HV33Z INSERTION OF INFUSION DEVICE INTO SUPERIOR VENA CAVA, PERCUTANEOUS APPROACH: ICD-10-PCS | Performed by: INTERNAL MEDICINE

## 2020-01-01 PROCEDURE — 96374 THER/PROPH/DIAG INJ IV PUSH: CPT

## 2020-01-01 PROCEDURE — 94002 VENT MGMT INPAT INIT DAY: CPT

## 2020-01-01 PROCEDURE — 87899 AGENT NOS ASSAY W/OPTIC: CPT | Performed by: NURSE PRACTITIONER

## 2020-01-01 PROCEDURE — 87641 MR-STAPH DNA AMP PROBE: CPT | Performed by: NURSE PRACTITIONER

## 2020-01-01 PROCEDURE — 82390 ASSAY OF CERULOPLASMIN: CPT | Performed by: NURSE PRACTITIONER

## 2020-01-01 PROCEDURE — 84132 ASSAY OF SERUM POTASSIUM: CPT | Performed by: INTERNAL MEDICINE

## 2020-01-01 PROCEDURE — 63710000001 INSULIN LISPRO (HUMAN) PER 5 UNITS: Performed by: NURSE PRACTITIONER

## 2020-01-01 PROCEDURE — 25010000002 MAGNESIUM SULFATE IN D5W 1G/100ML (PREMIX) 1-5 GM/100ML-% SOLUTION: Performed by: NURSE PRACTITIONER

## 2020-01-01 PROCEDURE — 82570 ASSAY OF URINE CREATININE: CPT | Performed by: INTERNAL MEDICINE

## 2020-01-01 PROCEDURE — 25010000002 CEFTRIAXONE PER 250 MG: Performed by: INTERNAL MEDICINE

## 2020-01-01 PROCEDURE — 71275 CT ANGIOGRAPHY CHEST: CPT

## 2020-01-01 PROCEDURE — 84443 ASSAY THYROID STIM HORMONE: CPT | Performed by: NURSE PRACTITIONER

## 2020-01-01 PROCEDURE — 82140 ASSAY OF AMMONIA: CPT | Performed by: INTERNAL MEDICINE

## 2020-01-01 PROCEDURE — 80053 COMPREHEN METABOLIC PANEL: CPT | Performed by: INTERNAL MEDICINE

## 2020-01-01 PROCEDURE — 85018 HEMOGLOBIN: CPT | Performed by: NURSE PRACTITIONER

## 2020-01-01 PROCEDURE — 97167 OT EVAL HIGH COMPLEX 60 MIN: CPT

## 2020-01-01 PROCEDURE — 87186 SC STD MICRODIL/AGAR DIL: CPT | Performed by: NURSE PRACTITIONER

## 2020-01-01 PROCEDURE — 25010000002 MORPHINE PER 10 MG: Performed by: NURSE PRACTITIONER

## 2020-01-01 PROCEDURE — 0 IOPAMIDOL PER 1 ML: Performed by: INTERNAL MEDICINE

## 2020-01-01 PROCEDURE — 85007 BL SMEAR W/DIFF WBC COUNT: CPT | Performed by: INTERNAL MEDICINE

## 2020-01-01 PROCEDURE — 99219 PR INITIAL OBSERVATION CARE/DAY 50 MINUTES: CPT | Performed by: NURSE PRACTITIONER

## 2020-01-01 PROCEDURE — 99284 EMERGENCY DEPT VISIT MOD MDM: CPT

## 2020-01-01 PROCEDURE — 80048 BASIC METABOLIC PNL TOTAL CA: CPT | Performed by: INTERNAL MEDICINE

## 2020-01-01 PROCEDURE — 93306 TTE W/DOPPLER COMPLETE: CPT

## 2020-01-01 PROCEDURE — 87635 SARS-COV-2 COVID-19 AMP PRB: CPT | Performed by: EMERGENCY MEDICINE

## 2020-01-01 PROCEDURE — 25010000002 FUROSEMIDE PER 20 MG: Performed by: EMERGENCY MEDICINE

## 2020-01-01 PROCEDURE — 83605 ASSAY OF LACTIC ACID: CPT | Performed by: INTERNAL MEDICINE

## 2020-01-01 PROCEDURE — 71250 CT THORAX DX C-: CPT

## 2020-01-01 PROCEDURE — 25010000002 HALOPERIDOL LACTATE PER 5 MG: Performed by: NURSE PRACTITIONER

## 2020-01-01 PROCEDURE — 82274 ASSAY TEST FOR BLOOD FECAL: CPT | Performed by: INTERNAL MEDICINE

## 2020-01-01 PROCEDURE — 82977 ASSAY OF GGT: CPT | Performed by: NURSE PRACTITIONER

## 2020-01-01 PROCEDURE — 25010000002 METHYLPREDNISOLONE PER 125 MG: Performed by: EMERGENCY MEDICINE

## 2020-01-01 PROCEDURE — 97535 SELF CARE MNGMENT TRAINING: CPT

## 2020-01-01 PROCEDURE — 93306 TTE W/DOPPLER COMPLETE: CPT | Performed by: INTERNAL MEDICINE

## 2020-01-01 PROCEDURE — 80061 LIPID PANEL: CPT | Performed by: NURSE PRACTITIONER

## 2020-01-01 PROCEDURE — 80074 ACUTE HEPATITIS PANEL: CPT | Performed by: NURSE PRACTITIONER

## 2020-01-01 PROCEDURE — 87086 URINE CULTURE/COLONY COUNT: CPT | Performed by: NURSE PRACTITIONER

## 2020-01-01 PROCEDURE — 92950 HEART/LUNG RESUSCITATION CPR: CPT

## 2020-01-01 PROCEDURE — 25010000002 ZIPRASIDONE MESYLATE PER 10 MG: Performed by: NURSE PRACTITIONER

## 2020-01-01 PROCEDURE — 83615 LACTATE (LD) (LDH) ENZYME: CPT | Performed by: NURSE PRACTITIONER

## 2020-01-01 PROCEDURE — 99285 EMERGENCY DEPT VISIT HI MDM: CPT

## 2020-01-01 PROCEDURE — 99217 PR OBSERVATION CARE DISCHARGE MANAGEMENT: CPT | Performed by: INTERNAL MEDICINE

## 2020-01-01 PROCEDURE — 93005 ELECTROCARDIOGRAM TRACING: CPT | Performed by: NURSE PRACTITIONER

## 2020-01-01 PROCEDURE — 87147 CULTURE TYPE IMMUNOLOGIC: CPT | Performed by: NURSE PRACTITIONER

## 2020-01-01 PROCEDURE — 03HC33Z INSERTION OF INFUSION DEVICE INTO LEFT RADIAL ARTERY, PERCUTANEOUS APPROACH: ICD-10-PCS | Performed by: INTERNAL MEDICINE

## 2020-01-01 PROCEDURE — 97162 PT EVAL MOD COMPLEX 30 MIN: CPT

## 2020-01-01 PROCEDURE — 82103 ALPHA-1-ANTITRYPSIN TOTAL: CPT | Performed by: NURSE PRACTITIONER

## 2020-01-01 PROCEDURE — 84145 PROCALCITONIN (PCT): CPT | Performed by: INTERNAL MEDICINE

## 2020-01-01 PROCEDURE — 05HM33Z INSERTION OF INFUSION DEVICE INTO RIGHT INTERNAL JUGULAR VEIN, PERCUTANEOUS APPROACH: ICD-10-PCS | Performed by: INTERNAL MEDICINE

## 2020-01-01 PROCEDURE — 97163 PT EVAL HIGH COMPLEX 45 MIN: CPT

## 2020-01-01 RX ORDER — PAROXETINE HYDROCHLORIDE 20 MG/1
40 TABLET, FILM COATED ORAL DAILY
Status: DISCONTINUED | OUTPATIENT
Start: 2020-01-01 | End: 2020-01-01

## 2020-01-01 RX ORDER — OXYCODONE AND ACETAMINOPHEN 10; 325 MG/1; MG/1
1 TABLET ORAL
Qty: 150 TABLET | Refills: 0 | Status: SHIPPED | OUTPATIENT
Start: 2020-01-01 | End: 2020-01-01 | Stop reason: SDUPTHER

## 2020-01-01 RX ORDER — IPRATROPIUM BROMIDE AND ALBUTEROL SULFATE 2.5; .5 MG/3ML; MG/3ML
SOLUTION RESPIRATORY (INHALATION)
Qty: 360 ML | Refills: 5 | Status: SHIPPED | OUTPATIENT
Start: 2020-01-01

## 2020-01-01 RX ORDER — SODIUM CHLORIDE 0.9 % (FLUSH) 0.9 %
10 SYRINGE (ML) INJECTION EVERY 12 HOURS SCHEDULED
Status: DISCONTINUED | OUTPATIENT
Start: 2020-01-01 | End: 2020-01-01 | Stop reason: HOSPADM

## 2020-01-01 RX ORDER — OXYCODONE AND ACETAMINOPHEN 10; 325 MG/1; MG/1
1 TABLET ORAL
Qty: 150 TABLET | Refills: 0 | Status: SHIPPED | OUTPATIENT
Start: 2020-01-01

## 2020-01-01 RX ORDER — OXYCODONE HYDROCHLORIDE 5 MG/1
10 TABLET ORAL EVERY 4 HOURS PRN
Status: DISCONTINUED | OUTPATIENT
Start: 2020-01-01 | End: 2020-01-01 | Stop reason: HOSPADM

## 2020-01-01 RX ORDER — NOREPINEPHRINE BIT/0.9 % NACL 8 MG/250ML
.02-.3 INFUSION BOTTLE (ML) INTRAVENOUS
Status: DISCONTINUED | OUTPATIENT
Start: 2020-01-01 | End: 2020-01-01

## 2020-01-01 RX ORDER — HEPARIN SODIUM 1000 [USP'U]/ML
4000 INJECTION, SOLUTION INTRAVENOUS; SUBCUTANEOUS AS NEEDED
Status: DISCONTINUED | OUTPATIENT
Start: 2020-01-01 | End: 2020-01-01 | Stop reason: HOSPADM

## 2020-01-01 RX ORDER — AMOXICILLIN AND CLAVULANATE POTASSIUM 875; 125 MG/1; MG/1
1 TABLET, FILM COATED ORAL 2 TIMES DAILY
Qty: 10 TABLET | Refills: 0 | Status: ON HOLD | OUTPATIENT
Start: 2020-01-01 | End: 2020-01-01

## 2020-01-01 RX ORDER — POTASSIUM CHLORIDE 20 MEQ/1
20 TABLET, EXTENDED RELEASE ORAL DAILY
Status: DISCONTINUED | OUTPATIENT
Start: 2020-01-01 | End: 2020-01-01 | Stop reason: HOSPADM

## 2020-01-01 RX ORDER — MORPHINE SULFATE 15 MG/1
15 TABLET, FILM COATED, EXTENDED RELEASE ORAL 2 TIMES DAILY
Qty: 60 TABLET | Refills: 0 | Status: SHIPPED | OUTPATIENT
Start: 2020-01-01 | End: 2020-01-01 | Stop reason: SDUPTHER

## 2020-01-01 RX ORDER — LINEZOLID 2 MG/ML
600 INJECTION, SOLUTION INTRAVENOUS EVERY 12 HOURS
Status: DISCONTINUED | OUTPATIENT
Start: 2020-01-01 | End: 2020-01-01 | Stop reason: HOSPADM

## 2020-01-01 RX ORDER — QUETIAPINE FUMARATE 25 MG/1
50 TABLET, FILM COATED ORAL NIGHTLY
Status: DISCONTINUED | OUTPATIENT
Start: 2020-01-01 | End: 2020-01-01

## 2020-01-01 RX ORDER — EPINEPHRINE 0.1 MG/ML
SYRINGE (ML) INJECTION
Status: COMPLETED | OUTPATIENT
Start: 2020-01-01 | End: 2020-01-01

## 2020-01-01 RX ORDER — ONDANSETRON 4 MG/1
4 TABLET, FILM COATED ORAL EVERY 6 HOURS PRN
Status: DISCONTINUED | OUTPATIENT
Start: 2020-01-01 | End: 2020-01-01 | Stop reason: HOSPADM

## 2020-01-01 RX ORDER — ACETAMINOPHEN 325 MG/1
650 TABLET ORAL EVERY 4 HOURS PRN
Status: DISCONTINUED | OUTPATIENT
Start: 2020-01-01 | End: 2020-01-01 | Stop reason: HOSPADM

## 2020-01-01 RX ORDER — FUROSEMIDE 20 MG/1
20 TABLET ORAL DAILY
Qty: 30 TABLET | Refills: 0 | Status: ON HOLD | OUTPATIENT
Start: 2020-01-01 | End: 2020-01-01

## 2020-01-01 RX ORDER — RISPERIDONE 0.5 MG/1
0.5 TABLET, ORALLY DISINTEGRATING ORAL NIGHTLY
Status: DISCONTINUED | OUTPATIENT
Start: 2020-01-01 | End: 2020-01-01

## 2020-01-01 RX ORDER — HEPARIN SODIUM 5000 [USP'U]/ML
5000 INJECTION, SOLUTION INTRAVENOUS; SUBCUTANEOUS EVERY 8 HOURS SCHEDULED
Status: DISCONTINUED | OUTPATIENT
Start: 2020-01-01 | End: 2020-01-01

## 2020-01-01 RX ORDER — FUROSEMIDE 10 MG/ML
40 INJECTION INTRAMUSCULAR; INTRAVENOUS EVERY 8 HOURS
Status: DISCONTINUED | OUTPATIENT
Start: 2020-01-01 | End: 2020-01-01

## 2020-01-01 RX ORDER — BISACODYL 10 MG
10 SUPPOSITORY, RECTAL RECTAL DAILY PRN
Status: DISCONTINUED | OUTPATIENT
Start: 2020-01-01 | End: 2020-01-01 | Stop reason: HOSPADM

## 2020-01-01 RX ORDER — OXYCODONE HYDROCHLORIDE 5 MG/1
10 TABLET ORAL EVERY 4 HOURS PRN
Status: DISCONTINUED | OUTPATIENT
Start: 2020-01-01 | End: 2020-01-01

## 2020-01-01 RX ORDER — MAGNESIUM SULFATE 1 G/100ML
1 INJECTION INTRAVENOUS AS NEEDED
Status: DISCONTINUED | OUTPATIENT
Start: 2020-01-01 | End: 2020-01-01 | Stop reason: HOSPADM

## 2020-01-01 RX ORDER — DEXTROSE MONOHYDRATE 25 G/50ML
50 INJECTION, SOLUTION INTRAVENOUS
Status: DISCONTINUED | OUTPATIENT
Start: 2020-01-01 | End: 2020-01-01 | Stop reason: HOSPADM

## 2020-01-01 RX ORDER — FUROSEMIDE 40 MG/1
TABLET ORAL
Qty: 30 TABLET | Refills: 2 | Status: SHIPPED | OUTPATIENT
Start: 2020-01-01

## 2020-01-01 RX ORDER — LAMOTRIGINE 100 MG/1
100 TABLET ORAL EVERY EVENING
COMMUNITY

## 2020-01-01 RX ORDER — LOSARTAN POTASSIUM 100 MG/1
100 TABLET ORAL DAILY
COMMUNITY

## 2020-01-01 RX ORDER — LORAZEPAM 2 MG/ML
1 INJECTION INTRAMUSCULAR ONCE AS NEEDED
Status: COMPLETED | OUTPATIENT
Start: 2020-01-01 | End: 2020-01-01

## 2020-01-01 RX ORDER — ALUMINA, MAGNESIA, AND SIMETHICONE 2400; 2400; 240 MG/30ML; MG/30ML; MG/30ML
15 SUSPENSION ORAL EVERY 6 HOURS PRN
Status: DISCONTINUED | OUTPATIENT
Start: 2020-01-01 | End: 2020-01-01 | Stop reason: HOSPADM

## 2020-01-01 RX ORDER — METOPROLOL SUCCINATE 100 MG/1
50 TABLET, EXTENDED RELEASE ORAL DAILY
COMMUNITY
End: 2020-01-01 | Stop reason: HOSPADM

## 2020-01-01 RX ORDER — AMIODARONE HYDROCHLORIDE 200 MG/1
100 TABLET ORAL DAILY
Status: DISCONTINUED | OUTPATIENT
Start: 2020-01-01 | End: 2020-01-01

## 2020-01-01 RX ORDER — NICOTINE POLACRILEX 4 MG
15 LOZENGE BUCCAL
Status: DISCONTINUED | OUTPATIENT
Start: 2020-01-01 | End: 2020-01-01 | Stop reason: HOSPADM

## 2020-01-01 RX ORDER — CHOLECALCIFEROL (VITAMIN D3) 125 MCG
20 CAPSULE ORAL NIGHTLY
COMMUNITY

## 2020-01-01 RX ORDER — ONDANSETRON 2 MG/ML
4 INJECTION INTRAMUSCULAR; INTRAVENOUS EVERY 6 HOURS PRN
Status: DISCONTINUED | OUTPATIENT
Start: 2020-01-01 | End: 2020-01-01 | Stop reason: HOSPADM

## 2020-01-01 RX ORDER — PREDNISONE 20 MG/1
TABLET ORAL
Qty: 19 TABLET | Refills: 0 | Status: ON HOLD | OUTPATIENT
Start: 2020-01-01 | End: 2020-01-01

## 2020-01-01 RX ORDER — IPRATROPIUM BROMIDE AND ALBUTEROL SULFATE 2.5; .5 MG/3ML; MG/3ML
3 SOLUTION RESPIRATORY (INHALATION)
Status: DISCONTINUED | OUTPATIENT
Start: 2020-01-01 | End: 2020-01-01 | Stop reason: HOSPADM

## 2020-01-01 RX ORDER — PANTOPRAZOLE SODIUM 40 MG/10ML
40 INJECTION, POWDER, LYOPHILIZED, FOR SOLUTION INTRAVENOUS EVERY 12 HOURS SCHEDULED
Status: DISCONTINUED | OUTPATIENT
Start: 2020-01-01 | End: 2020-01-01 | Stop reason: HOSPADM

## 2020-01-01 RX ORDER — ROFLUMILAST 500 UG/1
500 TABLET ORAL DAILY
Status: DISCONTINUED | OUTPATIENT
Start: 2020-01-01 | End: 2020-01-01

## 2020-01-01 RX ORDER — METOPROLOL SUCCINATE 25 MG/1
25 TABLET, EXTENDED RELEASE ORAL DAILY
Qty: 30 TABLET | Refills: 1 | Status: ON HOLD | OUTPATIENT
Start: 2020-01-01 | End: 2020-01-01

## 2020-01-01 RX ORDER — HYDRALAZINE HYDROCHLORIDE 20 MG/ML
10 INJECTION INTRAMUSCULAR; INTRAVENOUS EVERY 6 HOURS PRN
Status: DISCONTINUED | OUTPATIENT
Start: 2020-01-01 | End: 2020-01-01 | Stop reason: HOSPADM

## 2020-01-01 RX ORDER — MORPHINE SULFATE 4 MG/ML
2 INJECTION, SOLUTION INTRAMUSCULAR; INTRAVENOUS ONCE
Status: COMPLETED | OUTPATIENT
Start: 2020-01-01 | End: 2020-01-01

## 2020-01-01 RX ORDER — ACETAMINOPHEN 650 MG/1
650 SUPPOSITORY RECTAL EVERY 4 HOURS PRN
Status: DISCONTINUED | OUTPATIENT
Start: 2020-01-01 | End: 2020-01-01 | Stop reason: HOSPADM

## 2020-01-01 RX ORDER — AMIODARONE HYDROCHLORIDE 200 MG/1
100 TABLET ORAL DAILY
Status: DISCONTINUED | OUTPATIENT
Start: 2020-01-01 | End: 2020-01-01 | Stop reason: HOSPADM

## 2020-01-01 RX ORDER — SODIUM CHLORIDE 0.9 % (FLUSH) 0.9 %
10 SYRINGE (ML) INJECTION AS NEEDED
Status: DISCONTINUED | OUTPATIENT
Start: 2020-01-01 | End: 2020-01-01 | Stop reason: HOSPADM

## 2020-01-01 RX ORDER — DEXTROSE MONOHYDRATE 50 MG/ML
75 INJECTION, SOLUTION INTRAVENOUS CONTINUOUS
Status: DISCONTINUED | OUTPATIENT
Start: 2020-01-01 | End: 2020-01-01

## 2020-01-01 RX ORDER — ZIPRASIDONE MESYLATE 20 MG/ML
20 INJECTION, POWDER, LYOPHILIZED, FOR SOLUTION INTRAMUSCULAR ONCE
Status: COMPLETED | OUTPATIENT
Start: 2020-01-01 | End: 2020-01-01

## 2020-01-01 RX ORDER — MORPHINE SULFATE 15 MG/1
15 TABLET, FILM COATED, EXTENDED RELEASE ORAL 2 TIMES DAILY
Qty: 60 TABLET | Refills: 0 | Status: SHIPPED | OUTPATIENT
Start: 2020-01-01

## 2020-01-01 RX ORDER — MORPHINE SULFATE 15 MG/1
15 TABLET, FILM COATED, EXTENDED RELEASE ORAL 2 TIMES DAILY
Status: DISCONTINUED | OUTPATIENT
Start: 2020-01-01 | End: 2020-01-01

## 2020-01-01 RX ORDER — POLYETHYLENE GLYCOL 3350 17 G/17G
17 POWDER, FOR SOLUTION ORAL DAILY
Status: DISCONTINUED | OUTPATIENT
Start: 2020-01-01 | End: 2020-01-01 | Stop reason: HOSPADM

## 2020-01-01 RX ORDER — FUROSEMIDE 10 MG/ML
60 INJECTION INTRAMUSCULAR; INTRAVENOUS ONCE
Status: COMPLETED | OUTPATIENT
Start: 2020-01-01 | End: 2020-01-01

## 2020-01-01 RX ORDER — IPRATROPIUM BROMIDE AND ALBUTEROL SULFATE 2.5; .5 MG/3ML; MG/3ML
3 SOLUTION RESPIRATORY (INHALATION) ONCE
Status: COMPLETED | OUTPATIENT
Start: 2020-01-01 | End: 2020-01-01

## 2020-01-01 RX ORDER — LOSARTAN POTASSIUM 50 MG/1
100 TABLET ORAL DAILY
Status: DISCONTINUED | OUTPATIENT
Start: 2020-01-01 | End: 2020-01-01

## 2020-01-01 RX ORDER — MORPHINE SULFATE 10 MG/.5ML
10 SOLUTION ORAL 2 TIMES DAILY
Status: DISCONTINUED | OUTPATIENT
Start: 2020-01-01 | End: 2020-01-01

## 2020-01-01 RX ORDER — DEXTROSE MONOHYDRATE 25 G/50ML
25 INJECTION, SOLUTION INTRAVENOUS
Status: DISCONTINUED | OUTPATIENT
Start: 2020-01-01 | End: 2020-01-01 | Stop reason: HOSPADM

## 2020-01-01 RX ORDER — CHOLECALCIFEROL (VITAMIN D3) 125 MCG
5 CAPSULE ORAL NIGHTLY PRN
Status: DISCONTINUED | OUTPATIENT
Start: 2020-01-01 | End: 2020-01-01 | Stop reason: HOSPADM

## 2020-01-01 RX ORDER — FUROSEMIDE 10 MG/ML
40 INJECTION INTRAMUSCULAR; INTRAVENOUS DAILY
Status: DISCONTINUED | OUTPATIENT
Start: 2020-01-01 | End: 2020-01-01

## 2020-01-01 RX ORDER — ALPRAZOLAM 0.5 MG/1
0.5 TABLET ORAL 3 TIMES DAILY PRN
Status: DISCONTINUED | OUTPATIENT
Start: 2020-01-01 | End: 2020-01-01 | Stop reason: HOSPADM

## 2020-01-01 RX ORDER — BUDESONIDE 0.5 MG/2ML
INHALANT ORAL
Qty: 100 EACH | Refills: 4 | Status: SHIPPED | OUTPATIENT
Start: 2020-01-01

## 2020-01-01 RX ORDER — PAROXETINE HYDROCHLORIDE 20 MG/1
40 TABLET, FILM COATED ORAL DAILY
Status: DISCONTINUED | OUTPATIENT
Start: 2020-08-13 | End: 2020-01-01 | Stop reason: HOSPADM

## 2020-01-01 RX ORDER — FUROSEMIDE 10 MG/ML
40 INJECTION INTRAMUSCULAR; INTRAVENOUS ONCE
Status: COMPLETED | OUTPATIENT
Start: 2020-01-01 | End: 2020-01-01

## 2020-01-01 RX ORDER — METOPROLOL SUCCINATE 50 MG/1
50 TABLET, EXTENDED RELEASE ORAL DAILY
Status: DISCONTINUED | OUTPATIENT
Start: 2020-01-01 | End: 2020-01-01

## 2020-01-01 RX ORDER — ACETAMINOPHEN 160 MG/5ML
650 SOLUTION ORAL EVERY 4 HOURS PRN
Status: DISCONTINUED | OUTPATIENT
Start: 2020-01-01 | End: 2020-01-01 | Stop reason: HOSPADM

## 2020-01-01 RX ORDER — METHYLPREDNISOLONE SODIUM SUCCINATE 40 MG/ML
40 INJECTION, POWDER, LYOPHILIZED, FOR SOLUTION INTRAMUSCULAR; INTRAVENOUS EVERY 8 HOURS
Status: DISCONTINUED | OUTPATIENT
Start: 2020-01-01 | End: 2020-01-01

## 2020-01-01 RX ORDER — MAGNESIUM SULFATE HEPTAHYDRATE 40 MG/ML
2 INJECTION, SOLUTION INTRAVENOUS AS NEEDED
Status: DISCONTINUED | OUTPATIENT
Start: 2020-01-01 | End: 2020-01-01 | Stop reason: HOSPADM

## 2020-01-01 RX ORDER — METHYLPREDNISOLONE SODIUM SUCCINATE 125 MG/2ML
125 INJECTION, POWDER, LYOPHILIZED, FOR SOLUTION INTRAMUSCULAR; INTRAVENOUS ONCE
Status: COMPLETED | OUTPATIENT
Start: 2020-01-01 | End: 2020-01-01

## 2020-01-01 RX ORDER — OXYCODONE AND ACETAMINOPHEN 10; 325 MG/1; MG/1
1 TABLET ORAL
Qty: 150 TABLET | Refills: 0 | Status: ON HOLD | OUTPATIENT
Start: 2020-01-01 | End: 2020-01-01 | Stop reason: SDUPTHER

## 2020-01-01 RX ORDER — SODIUM CHLORIDE 450 MG/100ML
999 INJECTION, SOLUTION INTRAVENOUS ONCE
Status: COMPLETED | OUTPATIENT
Start: 2020-01-01 | End: 2020-01-01

## 2020-01-01 RX ORDER — HYDROCODONE BITARTRATE AND ACETAMINOPHEN 5; 325 MG/1; MG/1
1 TABLET ORAL ONCE AS NEEDED
Status: DISCONTINUED | OUTPATIENT
Start: 2020-01-01 | End: 2020-01-01 | Stop reason: HOSPADM

## 2020-01-01 RX ORDER — NOREPINEPHRINE BIT/0.9 % NACL 8 MG/250ML
INFUSION BOTTLE (ML) INTRAVENOUS
Status: COMPLETED
Start: 2020-01-01 | End: 2020-01-01

## 2020-01-01 RX ORDER — MIDODRINE HYDROCHLORIDE 5 MG/1
5 TABLET ORAL EVERY 8 HOURS SCHEDULED
Status: DISCONTINUED | OUTPATIENT
Start: 2020-01-01 | End: 2020-01-01

## 2020-01-01 RX ORDER — LAMOTRIGINE 100 MG/1
200 TABLET ORAL EVERY MORNING
Status: DISCONTINUED | OUTPATIENT
Start: 2020-01-01 | End: 2020-01-01 | Stop reason: HOSPADM

## 2020-01-01 RX ORDER — PAROXETINE HYDROCHLORIDE 40 MG/1
40 TABLET, FILM COATED ORAL DAILY
Status: DISCONTINUED | OUTPATIENT
Start: 2020-01-01 | End: 2020-01-01 | Stop reason: HOSPADM

## 2020-01-01 RX ORDER — FUROSEMIDE 10 MG/ML
40 INJECTION INTRAMUSCULAR; INTRAVENOUS EVERY 12 HOURS
Status: DISCONTINUED | OUTPATIENT
Start: 2020-01-01 | End: 2020-01-01 | Stop reason: HOSPADM

## 2020-01-01 RX ORDER — CALCIUM CHLORIDE 100 MG/ML
INJECTION INTRAVENOUS; INTRAVENTRICULAR
Status: COMPLETED | OUTPATIENT
Start: 2020-01-01 | End: 2020-01-01

## 2020-01-01 RX ORDER — LACTULOSE 10 G/15ML
30 SOLUTION ORAL EVERY 6 HOURS
Status: DISCONTINUED | OUTPATIENT
Start: 2020-01-01 | End: 2020-01-01 | Stop reason: HOSPADM

## 2020-01-01 RX ORDER — FUROSEMIDE 40 MG/1
40 TABLET ORAL DAILY
Status: DISCONTINUED | OUTPATIENT
Start: 2020-01-01 | End: 2020-01-01 | Stop reason: HOSPADM

## 2020-01-01 RX ORDER — AMIODARONE HYDROCHLORIDE 200 MG/1
100 TABLET ORAL DAILY
COMMUNITY
Start: 2020-01-01

## 2020-01-01 RX ORDER — METOPROLOL SUCCINATE 25 MG/1
25 TABLET, EXTENDED RELEASE ORAL DAILY
Status: DISCONTINUED | OUTPATIENT
Start: 2020-01-01 | End: 2020-01-01

## 2020-01-01 RX ORDER — CALCIUM GLUCONATE 20 MG/ML
1 INJECTION, SOLUTION INTRAVENOUS ONCE
Status: COMPLETED | OUTPATIENT
Start: 2020-01-01 | End: 2020-01-01

## 2020-01-01 RX ORDER — QUETIAPINE FUMARATE 25 MG/1
50 TABLET, FILM COATED ORAL NIGHTLY
Status: DISCONTINUED | OUTPATIENT
Start: 2020-01-01 | End: 2020-01-01 | Stop reason: HOSPADM

## 2020-01-01 RX ORDER — AMLODIPINE BESYLATE 5 MG/1
10 TABLET ORAL
Status: DISCONTINUED | OUTPATIENT
Start: 2020-01-01 | End: 2020-01-01

## 2020-01-01 RX ORDER — FUROSEMIDE 40 MG/1
TABLET ORAL
Qty: 30 TABLET | Refills: 2 | Status: SHIPPED | OUTPATIENT
Start: 2020-01-01 | End: 2020-01-01

## 2020-01-01 RX ORDER — LORAZEPAM 2 MG/ML
INJECTION INTRAMUSCULAR
Status: COMPLETED
Start: 2020-01-01 | End: 2020-01-01

## 2020-01-01 RX ORDER — ROFLUMILAST 500 UG/1
500 TABLET ORAL DAILY
Status: DISCONTINUED | OUTPATIENT
Start: 2020-01-01 | End: 2020-01-01 | Stop reason: HOSPADM

## 2020-01-01 RX ORDER — MORPHINE SULFATE 10 MG/.5ML
10 SOLUTION ORAL EVERY 6 HOURS PRN
Status: DISCONTINUED | OUTPATIENT
Start: 2020-01-01 | End: 2020-01-01

## 2020-01-01 RX ORDER — LAMOTRIGINE 100 MG/1
100 TABLET ORAL NIGHTLY
Status: DISCONTINUED | OUTPATIENT
Start: 2020-01-01 | End: 2020-01-01 | Stop reason: HOSPADM

## 2020-01-01 RX ORDER — KETOROLAC TROMETHAMINE 15 MG/ML
15 INJECTION, SOLUTION INTRAMUSCULAR; INTRAVENOUS ONCE
Status: COMPLETED | OUTPATIENT
Start: 2020-01-01 | End: 2020-01-01

## 2020-01-01 RX ORDER — ALBUMIN (HUMAN) 12.5 G/50ML
12.5 SOLUTION INTRAVENOUS AS NEEDED
Status: ACTIVE | OUTPATIENT
Start: 2020-01-01 | End: 2020-01-01

## 2020-01-01 RX ORDER — ROFLUMILAST 500 UG/1
500 TABLET ORAL DAILY
Qty: 30 TABLET | Refills: 6 | Status: SHIPPED | OUTPATIENT
Start: 2020-01-01

## 2020-01-01 RX ORDER — METOPROLOL SUCCINATE 100 MG/1
TABLET, EXTENDED RELEASE ORAL
Qty: 90 TABLET | Refills: 2 | Status: SHIPPED | OUTPATIENT
Start: 2020-01-01

## 2020-01-01 RX ORDER — HYDRALAZINE HYDROCHLORIDE 20 MG/ML
10 INJECTION INTRAMUSCULAR; INTRAVENOUS EVERY 6 HOURS PRN
Status: DISCONTINUED | OUTPATIENT
Start: 2020-01-01 | End: 2020-01-01

## 2020-01-01 RX ORDER — POTASSIUM CHLORIDE 7.45 MG/ML
10 INJECTION INTRAVENOUS
Status: DISCONTINUED | OUTPATIENT
Start: 2020-01-01 | End: 2020-01-01 | Stop reason: HOSPADM

## 2020-01-01 RX ORDER — HEPARIN SODIUM 1000 [USP'U]/ML
3000 INJECTION, SOLUTION INTRAVENOUS; SUBCUTANEOUS ONCE
Status: COMPLETED | OUTPATIENT
Start: 2020-01-01 | End: 2020-01-01

## 2020-01-01 RX ORDER — ETOMIDATE 2 MG/ML
INJECTION INTRAVENOUS
Status: DISCONTINUED
Start: 2020-01-01 | End: 2020-01-01 | Stop reason: HOSPADM

## 2020-01-01 RX ORDER — MIDODRINE HYDROCHLORIDE 5 MG/1
10 TABLET ORAL EVERY 8 HOURS SCHEDULED
Status: DISCONTINUED | OUTPATIENT
Start: 2020-01-01 | End: 2020-01-01

## 2020-01-01 RX ORDER — FAMOTIDINE 10 MG/ML
20 INJECTION, SOLUTION INTRAVENOUS DAILY
Status: DISCONTINUED | OUTPATIENT
Start: 2020-01-01 | End: 2020-01-01

## 2020-01-01 RX ORDER — HALOPERIDOL 5 MG/ML
2 INJECTION INTRAMUSCULAR EVERY 4 HOURS PRN
Status: DISCONTINUED | OUTPATIENT
Start: 2020-01-01 | End: 2020-01-01

## 2020-01-01 RX ORDER — METHYLPREDNISOLONE SODIUM SUCCINATE 40 MG/ML
40 INJECTION, POWDER, LYOPHILIZED, FOR SOLUTION INTRAMUSCULAR; INTRAVENOUS EVERY 12 HOURS
Status: DISCONTINUED | OUTPATIENT
Start: 2020-01-01 | End: 2020-01-01 | Stop reason: HOSPADM

## 2020-01-01 RX ORDER — IPRATROPIUM BROMIDE AND ALBUTEROL SULFATE 2.5; .5 MG/3ML; MG/3ML
3 SOLUTION RESPIRATORY (INHALATION) 4 TIMES DAILY PRN
Status: DISCONTINUED | OUTPATIENT
Start: 2020-01-01 | End: 2020-01-01 | Stop reason: HOSPADM

## 2020-01-01 RX ORDER — POTASSIUM CHLORIDE 1.5 G/1.77G
40 POWDER, FOR SOLUTION ORAL AS NEEDED
Status: DISCONTINUED | OUTPATIENT
Start: 2020-01-01 | End: 2020-01-01 | Stop reason: HOSPADM

## 2020-01-01 RX ORDER — BUDESONIDE 0.5 MG/2ML
0.5 INHALANT ORAL
Status: DISCONTINUED | OUTPATIENT
Start: 2020-01-01 | End: 2020-01-01 | Stop reason: HOSPADM

## 2020-01-01 RX ORDER — LAMOTRIGINE 100 MG/1
100 TABLET ORAL EVERY EVENING
Status: DISCONTINUED | OUTPATIENT
Start: 2020-01-01 | End: 2020-01-01 | Stop reason: HOSPADM

## 2020-01-01 RX ORDER — HYDRALAZINE HYDROCHLORIDE 20 MG/ML
10 INJECTION INTRAMUSCULAR; INTRAVENOUS EVERY 4 HOURS PRN
Status: DISCONTINUED | OUTPATIENT
Start: 2020-01-01 | End: 2020-01-01 | Stop reason: HOSPADM

## 2020-01-01 RX ORDER — METOPROLOL SUCCINATE 50 MG/1
100 TABLET, EXTENDED RELEASE ORAL DAILY
Status: DISCONTINUED | OUTPATIENT
Start: 2020-01-01 | End: 2020-01-01

## 2020-01-01 RX ORDER — HALOPERIDOL 5 MG/ML
2 INJECTION INTRAMUSCULAR ONCE
Status: COMPLETED | OUTPATIENT
Start: 2020-01-01 | End: 2020-01-01

## 2020-01-01 RX ORDER — MORPHINE SULFATE 15 MG/1
15 TABLET, FILM COATED, EXTENDED RELEASE ORAL 2 TIMES DAILY
Status: DISCONTINUED | OUTPATIENT
Start: 2020-01-01 | End: 2020-01-01 | Stop reason: HOSPADM

## 2020-01-01 RX ORDER — MIDAZOLAM HYDROCHLORIDE 1 MG/ML
INJECTION INTRAMUSCULAR; INTRAVENOUS
Status: DISCONTINUED
Start: 2020-01-01 | End: 2020-01-01 | Stop reason: HOSPADM

## 2020-01-01 RX ORDER — AMIODARONE HYDROCHLORIDE 50 MG/ML
INJECTION, SOLUTION INTRAVENOUS
Status: COMPLETED | OUTPATIENT
Start: 2020-01-01 | End: 2020-01-01

## 2020-01-01 RX ORDER — MIDODRINE HYDROCHLORIDE 5 MG/1
15 TABLET ORAL EVERY 8 HOURS SCHEDULED
Status: DISCONTINUED | OUTPATIENT
Start: 2020-01-01 | End: 2020-01-01

## 2020-01-01 RX ORDER — POTASSIUM CHLORIDE 750 MG/1
10 TABLET, FILM COATED, EXTENDED RELEASE ORAL DAILY
Qty: 30 TABLET | Refills: 0 | Status: ON HOLD | OUTPATIENT
Start: 2020-01-01 | End: 2020-01-01

## 2020-01-01 RX ORDER — METOPROLOL TARTRATE 50 MG/1
50 TABLET, FILM COATED ORAL EVERY 12 HOURS SCHEDULED
Status: DISCONTINUED | OUTPATIENT
Start: 2020-01-01 | End: 2020-01-01

## 2020-01-01 RX ORDER — LOSARTAN POTASSIUM 50 MG/1
50 TABLET ORAL DAILY
Status: DISCONTINUED | OUTPATIENT
Start: 2020-01-01 | End: 2020-01-01 | Stop reason: HOSPADM

## 2020-01-01 RX ORDER — LORAZEPAM 2 MG/ML
0.5 INJECTION INTRAMUSCULAR ONCE
Status: COMPLETED | OUTPATIENT
Start: 2020-01-01 | End: 2020-01-01

## 2020-01-01 RX ORDER — LAMOTRIGINE 100 MG/1
200 TABLET ORAL DAILY
Status: DISCONTINUED | OUTPATIENT
Start: 2020-01-01 | End: 2020-01-01 | Stop reason: HOSPADM

## 2020-01-01 RX ORDER — PAROXETINE HYDROCHLORIDE 20 MG/1
40 TABLET, FILM COATED ORAL DAILY
Status: DISCONTINUED | OUTPATIENT
Start: 2020-08-12 | End: 2020-01-01

## 2020-01-01 RX ORDER — POTASSIUM CHLORIDE 20 MEQ/1
40 TABLET, EXTENDED RELEASE ORAL AS NEEDED
Status: DISCONTINUED | OUTPATIENT
Start: 2020-01-01 | End: 2020-01-01 | Stop reason: HOSPADM

## 2020-01-01 RX ORDER — BISACODYL 5 MG/1
5 TABLET, DELAYED RELEASE ORAL DAILY PRN
Status: DISCONTINUED | OUTPATIENT
Start: 2020-01-01 | End: 2020-01-01 | Stop reason: HOSPADM

## 2020-01-01 RX ORDER — NALOXONE HYDROCHLORIDE 4 MG/.1ML
SPRAY NASAL
Status: ON HOLD | COMMUNITY
Start: 2019-01-01 | End: 2020-01-01

## 2020-01-01 RX ORDER — ALBUMIN (HUMAN) 12.5 G/50ML
12.5 SOLUTION INTRAVENOUS AS NEEDED
Status: CANCELLED | OUTPATIENT
Start: 2020-01-01 | End: 2020-01-01

## 2020-01-01 RX ORDER — DEXMEDETOMIDINE HYDROCHLORIDE 4 UG/ML
.2-1.5 INJECTION, SOLUTION INTRAVENOUS
Status: DISCONTINUED | OUTPATIENT
Start: 2020-01-01 | End: 2020-01-01

## 2020-01-01 RX ADMIN — HYDRALAZINE HYDROCHLORIDE 10 MG: 20 INJECTION INTRAMUSCULAR; INTRAVENOUS at 23:37

## 2020-01-01 RX ADMIN — PANTOPRAZOLE SODIUM 40 MG: 40 INJECTION, POWDER, FOR SOLUTION INTRAVENOUS at 21:27

## 2020-01-01 RX ADMIN — INSULIN LISPRO 2 UNITS: 100 INJECTION, SOLUTION INTRAVENOUS; SUBCUTANEOUS at 17:39

## 2020-01-01 RX ADMIN — BUDESONIDE 0.5 MG: 0.5 INHALANT RESPIRATORY (INHALATION) at 18:23

## 2020-01-01 RX ADMIN — MIDODRINE HYDROCHLORIDE 15 MG: 5 TABLET ORAL at 05:57

## 2020-01-01 RX ADMIN — Medication 10 ML: at 21:26

## 2020-01-01 RX ADMIN — PIPERACILLIN AND TAZOBACTAM 3.38 G: 3; .375 INJECTION, POWDER, FOR SOLUTION INTRAVENOUS at 01:07

## 2020-01-01 RX ADMIN — ROFLUMILAST 500 MCG: 500 TABLET ORAL at 08:32

## 2020-01-01 RX ADMIN — HEPARIN SODIUM 5000 UNITS: 5000 INJECTION INTRAVENOUS; SUBCUTANEOUS at 05:03

## 2020-01-01 RX ADMIN — Medication 10 ML: at 09:25

## 2020-01-01 RX ADMIN — Medication 10 ML: at 08:19

## 2020-01-01 RX ADMIN — PANTOPRAZOLE SODIUM 40 MG: 40 INJECTION, POWDER, FOR SOLUTION INTRAVENOUS at 21:04

## 2020-01-01 RX ADMIN — Medication 10 ML: at 21:27

## 2020-01-01 RX ADMIN — CEFEPIME HYDROCHLORIDE 2 G: 2 INJECTION, POWDER, FOR SOLUTION INTRAVENOUS at 21:26

## 2020-01-01 RX ADMIN — METOPROLOL TARTRATE 50 MG: 50 TABLET, FILM COATED ORAL at 21:42

## 2020-01-01 RX ADMIN — QUETIAPINE 50 MG: 25 TABLET, FILM COATED ORAL at 21:44

## 2020-01-01 RX ADMIN — NITROGLYCERIN 1 INCH: 20 OINTMENT TOPICAL at 06:17

## 2020-01-01 RX ADMIN — POTASSIUM CHLORIDE 10 MEQ: 10 INJECTION, SOLUTION INTRAVENOUS at 08:07

## 2020-01-01 RX ADMIN — IPRATROPIUM BROMIDE AND ALBUTEROL SULFATE 3 ML: 2.5; .5 SOLUTION RESPIRATORY (INHALATION) at 19:53

## 2020-01-01 RX ADMIN — HYDRALAZINE HYDROCHLORIDE 10 MG: 20 INJECTION INTRAMUSCULAR; INTRAVENOUS at 17:40

## 2020-01-01 RX ADMIN — HYDRALAZINE HYDROCHLORIDE 10 MG: 20 INJECTION INTRAMUSCULAR; INTRAVENOUS at 23:28

## 2020-01-01 RX ADMIN — Medication 10 ML: at 08:17

## 2020-01-01 RX ADMIN — IPRATROPIUM BROMIDE AND ALBUTEROL SULFATE 3 ML: 2.5; .5 SOLUTION RESPIRATORY (INHALATION) at 19:11

## 2020-01-01 RX ADMIN — IPRATROPIUM BROMIDE AND ALBUTEROL SULFATE 3 ML: 2.5; .5 SOLUTION RESPIRATORY (INHALATION) at 18:52

## 2020-01-01 RX ADMIN — Medication 8 MG: at 11:45

## 2020-01-01 RX ADMIN — METHYLPREDNISOLONE SODIUM SUCCINATE 40 MG: 40 INJECTION, POWDER, FOR SOLUTION INTRAMUSCULAR; INTRAVENOUS at 21:26

## 2020-01-01 RX ADMIN — FAMOTIDINE 20 MG: 10 INJECTION, SOLUTION INTRAVENOUS at 08:24

## 2020-01-01 RX ADMIN — QUETIAPINE 50 MG: 25 TABLET, FILM COATED ORAL at 20:56

## 2020-01-01 RX ADMIN — MORPHINE SULFATE 10 MG: 100 SOLUTION ORAL at 13:43

## 2020-01-01 RX ADMIN — METOPROLOL TARTRATE 50 MG: 50 TABLET, FILM COATED ORAL at 07:46

## 2020-01-01 RX ADMIN — Medication 1 MG: at 09:02

## 2020-01-01 RX ADMIN — IPRATROPIUM BROMIDE AND ALBUTEROL SULFATE 3 ML: 2.5; .5 SOLUTION RESPIRATORY (INHALATION) at 07:04

## 2020-01-01 RX ADMIN — MORPHINE SULFATE 10 MG: 100 SOLUTION ORAL at 20:15

## 2020-01-01 RX ADMIN — IPRATROPIUM BROMIDE AND ALBUTEROL SULFATE 3 ML: 2.5; .5 SOLUTION RESPIRATORY (INHALATION) at 12:02

## 2020-01-01 RX ADMIN — Medication 1 MG: at 09:14

## 2020-01-01 RX ADMIN — HEPARIN SODIUM 5000 UNITS: 5000 INJECTION INTRAVENOUS; SUBCUTANEOUS at 05:08

## 2020-01-01 RX ADMIN — POTASSIUM CHLORIDE 10 MEQ: 10 INJECTION, SOLUTION INTRAVENOUS at 10:42

## 2020-01-01 RX ADMIN — ACETAMINOPHEN 650 MG: 325 TABLET, FILM COATED ORAL at 22:56

## 2020-01-01 RX ADMIN — IPRATROPIUM BROMIDE AND ALBUTEROL SULFATE 3 ML: 2.5; .5 SOLUTION RESPIRATORY (INHALATION) at 07:07

## 2020-01-01 RX ADMIN — SODIUM CHLORIDE 0.7 MCG/KG/HR: 9 INJECTION, SOLUTION INTRAVENOUS at 00:42

## 2020-01-01 RX ADMIN — FUROSEMIDE 40 MG: 10 INJECTION, SOLUTION INTRAMUSCULAR; INTRAVENOUS at 08:36

## 2020-01-01 RX ADMIN — CHLOROTHIAZIDE SODIUM 500 MG: 500 INJECTION, POWDER, LYOPHILIZED, FOR SOLUTION INTRAVENOUS at 05:56

## 2020-01-01 RX ADMIN — IPRATROPIUM BROMIDE AND ALBUTEROL SULFATE 3 ML: 2.5; .5 SOLUTION RESPIRATORY (INHALATION) at 19:00

## 2020-01-01 RX ADMIN — LINEZOLID 600 MG: 600 INJECTION, SOLUTION INTRAVENOUS at 03:52

## 2020-01-01 RX ADMIN — DOCUSATE SODIUM 100 MG: 50 LIQUID ORAL at 08:07

## 2020-01-01 RX ADMIN — NITROGLYCERIN 1 INCH: 20 OINTMENT TOPICAL at 23:32

## 2020-01-01 RX ADMIN — BUDESONIDE 0.5 MG: 0.5 INHALANT RESPIRATORY (INHALATION) at 20:38

## 2020-01-01 RX ADMIN — HEPARIN SODIUM 5000 UNITS: 5000 INJECTION INTRAVENOUS; SUBCUTANEOUS at 14:14

## 2020-01-01 RX ADMIN — DEXTROSE MONOHYDRATE 50 ML: 25 INJECTION, SOLUTION INTRAVENOUS at 09:40

## 2020-01-01 RX ADMIN — LOSARTAN POTASSIUM 50 MG: 25 TABLET, FILM COATED ORAL at 09:14

## 2020-01-01 RX ADMIN — IPRATROPIUM BROMIDE AND ALBUTEROL SULFATE 3 ML: 2.5; .5 SOLUTION RESPIRATORY (INHALATION) at 15:43

## 2020-01-01 RX ADMIN — POTASSIUM CHLORIDE 40 MEQ: 1.5 FOR SOLUTION ORAL at 20:15

## 2020-01-01 RX ADMIN — METHYLPREDNISOLONE SODIUM SUCCINATE 40 MG: 40 INJECTION, POWDER, FOR SOLUTION INTRAMUSCULAR; INTRAVENOUS at 14:14

## 2020-01-01 RX ADMIN — MELATONIN TAB 5 MG 5 MG: 5 TAB at 20:56

## 2020-01-01 RX ADMIN — FUROSEMIDE 40 MG: 10 INJECTION, SOLUTION INTRAMUSCULAR; INTRAVENOUS at 08:16

## 2020-01-01 RX ADMIN — QUETIAPINE 50 MG: 25 TABLET, FILM COATED ORAL at 20:15

## 2020-01-01 RX ADMIN — LACTULOSE 30 G: 10 SOLUTION ORAL at 01:06

## 2020-01-01 RX ADMIN — Medication 10 ML: at 09:33

## 2020-01-01 RX ADMIN — BISACODYL 5 MG: 5 TABLET, COATED ORAL at 20:17

## 2020-01-01 RX ADMIN — SODIUM CHLORIDE 0.4 MCG/KG/HR: 9 INJECTION, SOLUTION INTRAVENOUS at 16:20

## 2020-01-01 RX ADMIN — IPRATROPIUM BROMIDE AND ALBUTEROL SULFATE 3 ML: 2.5; .5 SOLUTION RESPIRATORY (INHALATION) at 07:06

## 2020-01-01 RX ADMIN — LAMOTRIGINE 100 MG: 100 TABLET ORAL at 17:05

## 2020-01-01 RX ADMIN — LOSARTAN POTASSIUM 50 MG: 25 TABLET, FILM COATED ORAL at 20:34

## 2020-01-01 RX ADMIN — Medication 10 ML: at 20:11

## 2020-01-01 RX ADMIN — FUROSEMIDE 40 MG: 10 INJECTION, SOLUTION INTRAMUSCULAR; INTRAVENOUS at 05:30

## 2020-01-01 RX ADMIN — SODIUM CHLORIDE 1.2 MCG/KG/HR: 9 INJECTION, SOLUTION INTRAVENOUS at 00:13

## 2020-01-01 RX ADMIN — Medication 10 ML: at 08:36

## 2020-01-01 RX ADMIN — FAMOTIDINE 20 MG: 10 INJECTION, SOLUTION INTRAVENOUS at 08:07

## 2020-01-01 RX ADMIN — METOPROLOL SUCCINATE 50 MG: 50 TABLET, EXTENDED RELEASE ORAL at 11:15

## 2020-01-01 RX ADMIN — LORAZEPAM 0.5 MG: 2 INJECTION INTRAMUSCULAR; INTRAVENOUS at 14:34

## 2020-01-01 RX ADMIN — HEPARIN SODIUM 5000 UNITS: 5000 INJECTION INTRAVENOUS; SUBCUTANEOUS at 05:30

## 2020-01-01 RX ADMIN — ONDANSETRON HYDROCHLORIDE 4 MG: 4 TABLET, FILM COATED ORAL at 17:05

## 2020-01-01 RX ADMIN — BUDESONIDE 0.5 MG: 0.5 INHALANT RESPIRATORY (INHALATION) at 18:45

## 2020-01-01 RX ADMIN — MORPHINE SULFATE 15 MG: 15 TABLET, EXTENDED RELEASE ORAL at 21:41

## 2020-01-01 RX ADMIN — HEPARIN SODIUM 5000 UNITS: 5000 INJECTION INTRAVENOUS; SUBCUTANEOUS at 14:19

## 2020-01-01 RX ADMIN — Medication 10 ML: at 20:16

## 2020-01-01 RX ADMIN — NITROGLYCERIN 1 INCH: 20 OINTMENT TOPICAL at 05:23

## 2020-01-01 RX ADMIN — HYDRALAZINE HYDROCHLORIDE 10 MG: 20 INJECTION INTRAMUSCULAR; INTRAVENOUS at 09:58

## 2020-01-01 RX ADMIN — HEPARIN SODIUM 5000 UNITS: 5000 INJECTION INTRAVENOUS; SUBCUTANEOUS at 05:09

## 2020-01-01 RX ADMIN — DOCUSATE SODIUM 100 MG: 50 LIQUID ORAL at 08:37

## 2020-01-01 RX ADMIN — MORPHINE SULFATE 15 MG: 15 TABLET, EXTENDED RELEASE ORAL at 08:16

## 2020-01-01 RX ADMIN — MORPHINE SULFATE 15 MG: 15 TABLET, EXTENDED RELEASE ORAL at 10:19

## 2020-01-01 RX ADMIN — AMIODARONE HYDROCHLORIDE 300 MG: 50 INJECTION, SOLUTION INTRAVENOUS at 09:10

## 2020-01-01 RX ADMIN — ROFLUMILAST 500 MCG: 500 TABLET ORAL at 09:14

## 2020-01-01 RX ADMIN — HEPARIN SODIUM 5000 UNITS: 5000 INJECTION INTRAVENOUS; SUBCUTANEOUS at 15:11

## 2020-01-01 RX ADMIN — RISPERIDONE 0.5 MG: 0.5 TABLET, ORALLY DISINTEGRATING ORAL at 15:50

## 2020-01-01 RX ADMIN — BUDESONIDE 0.5 MG: 0.5 INHALANT RESPIRATORY (INHALATION) at 18:24

## 2020-01-01 RX ADMIN — AMIODARONE HYDROCHLORIDE 100 MG: 200 TABLET ORAL at 07:47

## 2020-01-01 RX ADMIN — MORPHINE SULFATE 2 MG: 4 INJECTION INTRAVENOUS at 20:17

## 2020-01-01 RX ADMIN — LAMOTRIGINE 100 MG: 100 TABLET ORAL at 21:09

## 2020-01-01 RX ADMIN — PAROXETINE HYDROCHLORIDE 40 MG: 40 TABLET, FILM COATED ORAL at 08:31

## 2020-01-01 RX ADMIN — LORAZEPAM 0.5 MG: 2 INJECTION INTRAMUSCULAR at 14:34

## 2020-01-01 RX ADMIN — ALUMINUM HYDROXIDE, MAGNESIUM HYDROXIDE, AND DIMETHICONE 15 ML: 400; 400; 40 SUSPENSION ORAL at 06:25

## 2020-01-01 RX ADMIN — HEPARIN SODIUM 5000 UNITS: 5000 INJECTION INTRAVENOUS; SUBCUTANEOUS at 23:02

## 2020-01-01 RX ADMIN — HEPARIN SODIUM 5000 UNITS: 5000 INJECTION INTRAVENOUS; SUBCUTANEOUS at 21:42

## 2020-01-01 RX ADMIN — FUROSEMIDE 40 MG: 10 INJECTION, SOLUTION INTRAMUSCULAR; INTRAVENOUS at 09:24

## 2020-01-01 RX ADMIN — IPRATROPIUM BROMIDE AND ALBUTEROL SULFATE 3 ML: 2.5; .5 SOLUTION RESPIRATORY (INHALATION) at 18:57

## 2020-01-01 RX ADMIN — METOPROLOL TARTRATE 2.5 MG: 5 INJECTION INTRAVENOUS at 01:45

## 2020-01-01 RX ADMIN — FAMOTIDINE 20 MG: 10 INJECTION, SOLUTION INTRAVENOUS at 09:57

## 2020-01-01 RX ADMIN — ALPRAZOLAM 0.5 MG: 0.5 TABLET ORAL at 09:55

## 2020-01-01 RX ADMIN — IPRATROPIUM BROMIDE AND ALBUTEROL SULFATE 3 ML: 2.5; .5 SOLUTION RESPIRATORY (INHALATION) at 16:27

## 2020-01-01 RX ADMIN — NITROGLYCERIN 1 INCH: 20 OINTMENT TOPICAL at 13:10

## 2020-01-01 RX ADMIN — QUETIAPINE 50 MG: 25 TABLET, FILM COATED ORAL at 20:11

## 2020-01-01 RX ADMIN — LOSARTAN POTASSIUM 100 MG: 50 TABLET, FILM COATED ORAL at 07:45

## 2020-01-01 RX ADMIN — POTASSIUM CHLORIDE 10 MEQ: 10 INJECTION, SOLUTION INTRAVENOUS at 20:42

## 2020-01-01 RX ADMIN — POTASSIUM CHLORIDE 10 MEQ: 10 INJECTION, SOLUTION INTRAVENOUS at 15:37

## 2020-01-01 RX ADMIN — ALPRAZOLAM 0.5 MG: 0.5 TABLET ORAL at 15:39

## 2020-01-01 RX ADMIN — DOCUSATE SODIUM 100 MG: 50 LIQUID ORAL at 21:09

## 2020-01-01 RX ADMIN — BISACODYL 10 MG: 10 SUPPOSITORY RECTAL at 19:48

## 2020-01-01 RX ADMIN — IPRATROPIUM BROMIDE AND ALBUTEROL SULFATE 3 ML: 2.5; .5 SOLUTION RESPIRATORY (INHALATION) at 15:34

## 2020-01-01 RX ADMIN — MIDODRINE HYDROCHLORIDE 15 MG: 5 TABLET ORAL at 14:34

## 2020-01-01 RX ADMIN — POTASSIUM CHLORIDE 10 MEQ: 10 INJECTION, SOLUTION INTRAVENOUS at 21:40

## 2020-01-01 RX ADMIN — HYDRALAZINE HYDROCHLORIDE 10 MG: 20 INJECTION INTRAMUSCULAR; INTRAVENOUS at 21:04

## 2020-01-01 RX ADMIN — Medication 10 ML: at 08:50

## 2020-01-01 RX ADMIN — FUROSEMIDE 40 MG: 10 INJECTION, SOLUTION INTRAMUSCULAR; INTRAVENOUS at 21:41

## 2020-01-01 RX ADMIN — LOSARTAN POTASSIUM 50 MG: 25 TABLET, FILM COATED ORAL at 08:58

## 2020-01-01 RX ADMIN — OXYCODONE HYDROCHLORIDE 10 MG: 5 TABLET ORAL at 03:41

## 2020-01-01 RX ADMIN — SODIUM CHLORIDE 1.2 MCG/KG/HR: 9 INJECTION, SOLUTION INTRAVENOUS at 04:02

## 2020-01-01 RX ADMIN — IPRATROPIUM BROMIDE AND ALBUTEROL SULFATE 3 ML: 2.5; .5 SOLUTION RESPIRATORY (INHALATION) at 11:45

## 2020-01-01 RX ADMIN — INSULIN HUMAN 6 UNITS: 100 INJECTION, SOLUTION PARENTERAL at 11:30

## 2020-01-01 RX ADMIN — MAGNESIUM HYDROXIDE 10 ML: 2400 SUSPENSION ORAL at 09:00

## 2020-01-01 RX ADMIN — Medication 10 ML: at 22:00

## 2020-01-01 RX ADMIN — BUDESONIDE 0.5 MG: 0.5 INHALANT RESPIRATORY (INHALATION) at 07:04

## 2020-01-01 RX ADMIN — SODIUM CHLORIDE 0.4 MCG/KG/HR: 9 INJECTION, SOLUTION INTRAVENOUS at 11:31

## 2020-01-01 RX ADMIN — METOPROLOL SUCCINATE 50 MG: 50 TABLET, EXTENDED RELEASE ORAL at 20:57

## 2020-01-01 RX ADMIN — BUDESONIDE 0.5 MG: 0.5 INHALANT RESPIRATORY (INHALATION) at 07:53

## 2020-01-01 RX ADMIN — Medication 10 ML: at 23:04

## 2020-01-01 RX ADMIN — METHYLPREDNISOLONE SODIUM SUCCINATE 40 MG: 40 INJECTION, POWDER, FOR SOLUTION INTRAMUSCULAR; INTRAVENOUS at 04:56

## 2020-01-01 RX ADMIN — HYDRALAZINE HYDROCHLORIDE 10 MG: 20 INJECTION INTRAMUSCULAR; INTRAVENOUS at 00:30

## 2020-01-01 RX ADMIN — SODIUM CHLORIDE 1 MCG/KG/HR: 9 INJECTION, SOLUTION INTRAVENOUS at 17:19

## 2020-01-01 RX ADMIN — METHYLPREDNISOLONE SODIUM SUCCINATE 40 MG: 40 INJECTION, POWDER, FOR SOLUTION INTRAMUSCULAR; INTRAVENOUS at 21:27

## 2020-01-01 RX ADMIN — OXYCODONE HYDROCHLORIDE 10 MG: 5 TABLET ORAL at 13:10

## 2020-01-01 RX ADMIN — HEPARIN SODIUM 5000 UNITS: 5000 INJECTION INTRAVENOUS; SUBCUTANEOUS at 06:24

## 2020-01-01 RX ADMIN — DOCUSATE SODIUM 100 MG: 50 LIQUID ORAL at 21:26

## 2020-01-01 RX ADMIN — SODIUM CHLORIDE 7.5 MG/HR: 9 INJECTION, SOLUTION INTRAVENOUS at 03:30

## 2020-01-01 RX ADMIN — BUDESONIDE 0.5 MG: 0.5 INHALANT RESPIRATORY (INHALATION) at 07:06

## 2020-01-01 RX ADMIN — METHYLPREDNISOLONE SODIUM SUCCINATE 40 MG: 40 INJECTION, POWDER, FOR SOLUTION INTRAMUSCULAR; INTRAVENOUS at 09:31

## 2020-01-01 RX ADMIN — LAMOTRIGINE 100 MG: 100 TABLET ORAL at 20:34

## 2020-01-01 RX ADMIN — DOCUSATE SODIUM 100 MG: 50 LIQUID ORAL at 09:24

## 2020-01-01 RX ADMIN — HALOPERIDOL LACTATE 2 MG: 5 INJECTION INTRAMUSCULAR at 05:55

## 2020-01-01 RX ADMIN — BUDESONIDE 0.5 MG: 0.5 INHALANT RESPIRATORY (INHALATION) at 07:33

## 2020-01-01 RX ADMIN — NITROGLYCERIN 1 INCH: 20 OINTMENT TOPICAL at 18:01

## 2020-01-01 RX ADMIN — LACTULOSE 30 G: 10 SOLUTION ORAL at 05:01

## 2020-01-01 RX ADMIN — LAMOTRIGINE 100 MG: 100 TABLET ORAL at 18:00

## 2020-01-01 RX ADMIN — HYDRALAZINE HYDROCHLORIDE 10 MG: 20 INJECTION INTRAMUSCULAR; INTRAVENOUS at 04:58

## 2020-01-01 RX ADMIN — LACTULOSE 30 G: 10 SOLUTION ORAL at 17:26

## 2020-01-01 RX ADMIN — HEPARIN SODIUM 5000 UNITS: 5000 INJECTION INTRAVENOUS; SUBCUTANEOUS at 05:41

## 2020-01-01 RX ADMIN — MAGNESIUM SULFATE HEPTAHYDRATE 1 G: 1 INJECTION, SOLUTION INTRAVENOUS at 08:57

## 2020-01-01 RX ADMIN — LAMOTRIGINE 200 MG: 100 TABLET ORAL at 08:16

## 2020-01-01 RX ADMIN — MIDODRINE HYDROCHLORIDE 10 MG: 5 TABLET ORAL at 05:03

## 2020-01-01 RX ADMIN — FUROSEMIDE 40 MG: 10 INJECTION, SOLUTION INTRAMUSCULAR; INTRAVENOUS at 13:39

## 2020-01-01 RX ADMIN — IPRATROPIUM BROMIDE AND ALBUTEROL SULFATE 3 ML: 2.5; .5 SOLUTION RESPIRATORY (INHALATION) at 18:23

## 2020-01-01 RX ADMIN — DEXTROSE MONOHYDRATE 75 ML/HR: 50 INJECTION, SOLUTION INTRAVENOUS at 16:33

## 2020-01-01 RX ADMIN — BUDESONIDE 0.5 MG: 0.5 INHALANT RESPIRATORY (INHALATION) at 18:57

## 2020-01-01 RX ADMIN — PAROXETINE HYDROCHLORIDE 40 MG: 20 TABLET, FILM COATED ORAL at 08:16

## 2020-01-01 RX ADMIN — ACETAMINOPHEN 650 MG: 325 TABLET, FILM COATED ORAL at 16:44

## 2020-01-01 RX ADMIN — DEXTROSE MONOHYDRATE 50 ML/HR: 50 INJECTION, SOLUTION INTRAVENOUS at 10:00

## 2020-01-01 RX ADMIN — SODIUM CHLORIDE 40 MG/HR: 9 INJECTION, SOLUTION INTRAVENOUS at 20:48

## 2020-01-01 RX ADMIN — LAMOTRIGINE 100 MG: 100 TABLET ORAL at 21:26

## 2020-01-01 RX ADMIN — SODIUM BICARBONATE 50 MEQ: 84 INJECTION, SOLUTION INTRAVENOUS at 09:13

## 2020-01-01 RX ADMIN — HYDRALAZINE HYDROCHLORIDE 10 MG: 20 INJECTION INTRAMUSCULAR; INTRAVENOUS at 08:37

## 2020-01-01 RX ADMIN — HYDROCODONE BITARTRATE AND ACETAMINOPHEN 1 TABLET: 5; 325 TABLET ORAL at 10:16

## 2020-01-01 RX ADMIN — ALPRAZOLAM 0.5 MG: 0.5 TABLET ORAL at 09:14

## 2020-01-01 RX ADMIN — PANTOPRAZOLE SODIUM 40 MG: 40 INJECTION, POWDER, FOR SOLUTION INTRAVENOUS at 09:39

## 2020-01-01 RX ADMIN — LAMOTRIGINE 100 MG: 100 TABLET ORAL at 20:15

## 2020-01-01 RX ADMIN — SODIUM CHLORIDE 40 MG/HR: 9 INJECTION, SOLUTION INTRAVENOUS at 03:14

## 2020-01-01 RX ADMIN — MIDODRINE HYDROCHLORIDE 10 MG: 5 TABLET ORAL at 15:11

## 2020-01-01 RX ADMIN — Medication 0.18 MCG/KG/MIN: at 21:58

## 2020-01-01 RX ADMIN — AMLODIPINE BESYLATE 10 MG: 5 TABLET ORAL at 08:14

## 2020-01-01 RX ADMIN — FUROSEMIDE 40 MG: 10 INJECTION, SOLUTION INTRAMUSCULAR; INTRAVENOUS at 05:28

## 2020-01-01 RX ADMIN — LINEZOLID 600 MG: 600 INJECTION, SOLUTION INTRAVENOUS at 15:26

## 2020-01-01 RX ADMIN — FUROSEMIDE 40 MG: 10 INJECTION, SOLUTION INTRAMUSCULAR; INTRAVENOUS at 08:07

## 2020-01-01 RX ADMIN — Medication 10 ML: at 08:15

## 2020-01-01 RX ADMIN — BUDESONIDE 0.5 MG: 0.5 INHALANT RESPIRATORY (INHALATION) at 18:52

## 2020-01-01 RX ADMIN — METOPROLOL TARTRATE 12.5 MG: 25 TABLET, FILM COATED ORAL at 09:12

## 2020-01-01 RX ADMIN — DEXTROSE MONOHYDRATE 50 ML: 25 INJECTION, SOLUTION INTRAVENOUS at 15:26

## 2020-01-01 RX ADMIN — QUETIAPINE 50 MG: 25 TABLET, FILM COATED ORAL at 21:40

## 2020-01-01 RX ADMIN — METOPROLOL TARTRATE 50 MG: 50 TABLET, FILM COATED ORAL at 20:15

## 2020-01-01 RX ADMIN — IPRATROPIUM BROMIDE AND ALBUTEROL SULFATE 3 ML: 2.5; .5 SOLUTION RESPIRATORY (INHALATION) at 07:34

## 2020-01-01 RX ADMIN — OXYCODONE HYDROCHLORIDE 10 MG: 5 TABLET ORAL at 09:52

## 2020-01-01 RX ADMIN — CALCIUM CHLORIDE 1 G: 100 INJECTION INTRAVENOUS; INTRAVENTRICULAR at 09:29

## 2020-01-01 RX ADMIN — ALPRAZOLAM 0.5 MG: 0.5 TABLET ORAL at 20:11

## 2020-01-01 RX ADMIN — LOSARTAN POTASSIUM 100 MG: 50 TABLET, FILM COATED ORAL at 09:24

## 2020-01-01 RX ADMIN — POTASSIUM CHLORIDE 10 MEQ: 10 INJECTION, SOLUTION INTRAVENOUS at 12:41

## 2020-01-01 RX ADMIN — POLYETHYLENE GLYCOL 3350 17 G: 17 POWDER, FOR SOLUTION ORAL at 09:41

## 2020-01-01 RX ADMIN — CALCIUM GLUCONATE 1 G: 20 INJECTION, SOLUTION INTRAVENOUS at 09:40

## 2020-01-01 RX ADMIN — Medication 10 ML: at 20:34

## 2020-01-01 RX ADMIN — HEPARIN SODIUM 5000 UNITS: 5000 INJECTION INTRAVENOUS; SUBCUTANEOUS at 05:48

## 2020-01-01 RX ADMIN — Medication 1 MG: at 09:29

## 2020-01-01 RX ADMIN — ACETAMINOPHEN 650 MG: 325 TABLET, FILM COATED ORAL at 20:15

## 2020-01-01 RX ADMIN — LINEZOLID 600 MG: 600 INJECTION, SOLUTION INTRAVENOUS at 16:16

## 2020-01-01 RX ADMIN — METHYLPREDNISOLONE SODIUM SUCCINATE 40 MG: 40 INJECTION, POWDER, FOR SOLUTION INTRAMUSCULAR; INTRAVENOUS at 04:17

## 2020-01-01 RX ADMIN — METHYLPREDNISOLONE SODIUM SUCCINATE 40 MG: 40 INJECTION, POWDER, FOR SOLUTION INTRAMUSCULAR; INTRAVENOUS at 03:44

## 2020-01-01 RX ADMIN — ACETAMINOPHEN 650 MG: 650 SUPPOSITORY RECTAL at 22:50

## 2020-01-01 RX ADMIN — OXYCODONE HYDROCHLORIDE 10 MG: 5 TABLET ORAL at 17:05

## 2020-01-01 RX ADMIN — MORPHINE SULFATE 15 MG: 15 TABLET, EXTENDED RELEASE ORAL at 20:56

## 2020-01-01 RX ADMIN — DEXTROSE MONOHYDRATE 50 ML: 25 INJECTION, SOLUTION INTRAVENOUS at 00:22

## 2020-01-01 RX ADMIN — LINEZOLID 600 MG: 600 INJECTION, SOLUTION INTRAVENOUS at 03:59

## 2020-01-01 RX ADMIN — Medication 1 MG: at 09:10

## 2020-01-01 RX ADMIN — OXYCODONE HYDROCHLORIDE 10 MG: 5 TABLET ORAL at 15:39

## 2020-01-01 RX ADMIN — Medication 10 ML: at 08:32

## 2020-01-01 RX ADMIN — SODIUM CHLORIDE 40 MG/HR: 9 INJECTION, SOLUTION INTRAVENOUS at 09:56

## 2020-01-01 RX ADMIN — PIPERACILLIN SODIUM AND TAZOBACTAM SODIUM 4.5 G: 4; .5 INJECTION, POWDER, LYOPHILIZED, FOR SOLUTION INTRAVENOUS at 05:41

## 2020-01-01 RX ADMIN — CALCIUM GLUCONATE 1 G: 20 INJECTION, SOLUTION INTRAVENOUS at 18:24

## 2020-01-01 RX ADMIN — LAMOTRIGINE 200 MG: 100 TABLET ORAL at 09:14

## 2020-01-01 RX ADMIN — NITROGLYCERIN 1 INCH: 20 OINTMENT TOPICAL at 18:45

## 2020-01-01 RX ADMIN — POTASSIUM CHLORIDE 10 MEQ: 10 INJECTION, SOLUTION INTRAVENOUS at 06:56

## 2020-01-01 RX ADMIN — AMIODARONE HYDROCHLORIDE 200 MG: 200 TABLET ORAL at 09:13

## 2020-01-01 RX ADMIN — LINEZOLID 600 MG: 600 INJECTION, SOLUTION INTRAVENOUS at 04:56

## 2020-01-01 RX ADMIN — ALUMINUM HYDROXIDE, MAGNESIUM HYDROXIDE, AND DIMETHICONE 15 ML: 400; 400; 40 SUSPENSION ORAL at 15:38

## 2020-01-01 RX ADMIN — PAROXETINE HYDROCHLORIDE 40 MG: 40 TABLET, FILM COATED ORAL at 08:19

## 2020-01-01 RX ADMIN — INSULIN LISPRO 2 UNITS: 100 INJECTION, SOLUTION INTRAVENOUS; SUBCUTANEOUS at 21:27

## 2020-01-01 RX ADMIN — PANTOPRAZOLE SODIUM 40 MG: 40 INJECTION, POWDER, FOR SOLUTION INTRAVENOUS at 09:24

## 2020-01-01 RX ADMIN — METHYLPREDNISOLONE SODIUM SUCCINATE 40 MG: 40 INJECTION, POWDER, FOR SOLUTION INTRAMUSCULAR; INTRAVENOUS at 21:09

## 2020-01-01 RX ADMIN — LINEZOLID 600 MG: 600 INJECTION, SOLUTION INTRAVENOUS at 04:49

## 2020-01-01 RX ADMIN — SODIUM CHLORIDE 5 MG/HR: 9 INJECTION, SOLUTION INTRAVENOUS at 02:48

## 2020-01-01 RX ADMIN — POLYETHYLENE GLYCOL 3350 17 G: 17 POWDER, FOR SOLUTION ORAL at 08:36

## 2020-01-01 RX ADMIN — PIPERACILLIN AND TAZOBACTAM 3.38 G: 3; .375 INJECTION, POWDER, FOR SOLUTION INTRAVENOUS at 16:16

## 2020-01-01 RX ADMIN — AMIODARONE HYDROCHLORIDE 100 MG: 200 TABLET ORAL at 08:15

## 2020-01-01 RX ADMIN — METHYLPREDNISOLONE SODIUM SUCCINATE 40 MG: 40 INJECTION, POWDER, FOR SOLUTION INTRAMUSCULAR; INTRAVENOUS at 12:08

## 2020-01-01 RX ADMIN — HYDROCODONE BITARTRATE AND ACETAMINOPHEN 1 TABLET: 5; 325 TABLET ORAL at 18:02

## 2020-01-01 RX ADMIN — HEPARIN SODIUM 5000 UNITS: 5000 INJECTION INTRAVENOUS; SUBCUTANEOUS at 21:08

## 2020-01-01 RX ADMIN — FUROSEMIDE 60 MG: 10 INJECTION, SOLUTION INTRAVENOUS at 01:45

## 2020-01-01 RX ADMIN — POLYETHYLENE GLYCOL 3350 17 G: 17 POWDER, FOR SOLUTION ORAL at 09:25

## 2020-01-01 RX ADMIN — ONDANSETRON HYDROCHLORIDE 4 MG: 4 TABLET, FILM COATED ORAL at 01:51

## 2020-01-01 RX ADMIN — NITROGLYCERIN 1 INCH: 20 OINTMENT TOPICAL at 17:05

## 2020-01-01 RX ADMIN — INSULIN HUMAN 10 UNITS: 100 INJECTION, SOLUTION PARENTERAL at 15:25

## 2020-01-01 RX ADMIN — SODIUM CHLORIDE 999 ML/HR: 450 INJECTION, SOLUTION INTRAVENOUS at 13:40

## 2020-01-01 RX ADMIN — NITROGLYCERIN 1 INCH: 20 OINTMENT TOPICAL at 06:25

## 2020-01-01 RX ADMIN — BUDESONIDE 0.5 MG: 0.5 INHALANT RESPIRATORY (INHALATION) at 07:32

## 2020-01-01 RX ADMIN — IPRATROPIUM BROMIDE AND ALBUTEROL SULFATE 3 ML: 2.5; .5 SOLUTION RESPIRATORY (INHALATION) at 06:29

## 2020-01-01 RX ADMIN — LAMOTRIGINE 200 MG: 100 TABLET ORAL at 09:25

## 2020-01-01 RX ADMIN — IPRATROPIUM BROMIDE AND ALBUTEROL SULFATE 3 ML: 2.5; .5 SOLUTION RESPIRATORY (INHALATION) at 10:55

## 2020-01-01 RX ADMIN — FUROSEMIDE 40 MG: 10 INJECTION, SOLUTION INTRAMUSCULAR; INTRAVENOUS at 21:04

## 2020-01-01 RX ADMIN — LAMOTRIGINE 200 MG: 100 TABLET ORAL at 06:18

## 2020-01-01 RX ADMIN — FUROSEMIDE 40 MG: 40 TABLET ORAL at 09:14

## 2020-01-01 RX ADMIN — Medication 10 ML: at 21:33

## 2020-01-01 RX ADMIN — METOPROLOL TARTRATE 50 MG: 50 TABLET, FILM COATED ORAL at 10:58

## 2020-01-01 RX ADMIN — METHYLPREDNISOLONE SODIUM SUCCINATE 40 MG: 40 INJECTION, POWDER, FOR SOLUTION INTRAMUSCULAR; INTRAVENOUS at 23:02

## 2020-01-01 RX ADMIN — IPRATROPIUM BROMIDE AND ALBUTEROL SULFATE 3 ML: 2.5; .5 SOLUTION RESPIRATORY (INHALATION) at 15:13

## 2020-01-01 RX ADMIN — LINEZOLID 600 MG: 600 INJECTION, SOLUTION INTRAVENOUS at 15:12

## 2020-01-01 RX ADMIN — HEPARIN SODIUM 5000 UNITS: 5000 INJECTION INTRAVENOUS; SUBCUTANEOUS at 13:24

## 2020-01-01 RX ADMIN — AMIODARONE HYDROCHLORIDE 100 MG: 200 TABLET ORAL at 08:58

## 2020-01-01 RX ADMIN — SODIUM CHLORIDE 5 MG/HR: 9 INJECTION, SOLUTION INTRAVENOUS at 05:56

## 2020-01-01 RX ADMIN — PIPERACILLIN SODIUM AND TAZOBACTAM SODIUM 4.5 G: 4; .5 INJECTION, POWDER, LYOPHILIZED, FOR SOLUTION INTRAVENOUS at 13:39

## 2020-01-01 RX ADMIN — SODIUM CHLORIDE 2.5 MG/HR: 9 INJECTION, SOLUTION INTRAVENOUS at 12:00

## 2020-01-01 RX ADMIN — PIPERACILLIN AND TAZOBACTAM 3.38 G: 3; .375 INJECTION, POWDER, FOR SOLUTION INTRAVENOUS at 23:59

## 2020-01-01 RX ADMIN — HYDRALAZINE HYDROCHLORIDE 10 MG: 20 INJECTION INTRAMUSCULAR; INTRAVENOUS at 13:25

## 2020-01-01 RX ADMIN — METHYLPREDNISOLONE SODIUM SUCCINATE 40 MG: 40 INJECTION, POWDER, FOR SOLUTION INTRAMUSCULAR; INTRAVENOUS at 21:04

## 2020-01-01 RX ADMIN — HALOPERIDOL LACTATE 2 MG: 5 INJECTION, SOLUTION INTRAMUSCULAR at 07:30

## 2020-01-01 RX ADMIN — FUROSEMIDE 40 MG: 10 INJECTION, SOLUTION INTRAMUSCULAR; INTRAVENOUS at 21:52

## 2020-01-01 RX ADMIN — PAROXETINE HYDROCHLORIDE 40 MG: 40 TABLET, FILM COATED ORAL at 09:14

## 2020-01-01 RX ADMIN — PIPERACILLIN SODIUM AND TAZOBACTAM SODIUM 4.5 G: 4; .5 INJECTION, POWDER, LYOPHILIZED, FOR SOLUTION INTRAVENOUS at 18:23

## 2020-01-01 RX ADMIN — POTASSIUM CHLORIDE 10 MEQ: 10 INJECTION, SOLUTION INTRAVENOUS at 06:04

## 2020-01-01 RX ADMIN — SODIUM CHLORIDE 5 MG/HR: 9 INJECTION, SOLUTION INTRAVENOUS at 16:50

## 2020-01-01 RX ADMIN — LAMOTRIGINE 200 MG: 100 TABLET ORAL at 09:24

## 2020-01-01 RX ADMIN — SODIUM CHLORIDE 20 MG/HR: 9 INJECTION, SOLUTION INTRAVENOUS at 04:27

## 2020-01-01 RX ADMIN — SODIUM CHLORIDE 20 MG/HR: 9 INJECTION, SOLUTION INTRAVENOUS at 09:53

## 2020-01-01 RX ADMIN — METHYLPREDNISOLONE SODIUM SUCCINATE 125 MG: 125 INJECTION, POWDER, FOR SOLUTION INTRAMUSCULAR; INTRAVENOUS at 17:23

## 2020-01-01 RX ADMIN — Medication 10 ML: at 08:02

## 2020-01-01 RX ADMIN — IPRATROPIUM BROMIDE AND ALBUTEROL SULFATE 3 ML: 2.5; .5 SOLUTION RESPIRATORY (INHALATION) at 07:53

## 2020-01-01 RX ADMIN — ZIPRASIDONE MESYLATE 20 MG: 20 INJECTION, POWDER, LYOPHILIZED, FOR SOLUTION INTRAMUSCULAR at 08:16

## 2020-01-01 RX ADMIN — OXYCODONE HYDROCHLORIDE 10 MG: 5 TABLET ORAL at 13:24

## 2020-01-01 RX ADMIN — BUDESONIDE 0.5 MG: 0.5 INHALANT RESPIRATORY (INHALATION) at 19:53

## 2020-01-01 RX ADMIN — Medication 10 ML: at 09:40

## 2020-01-01 RX ADMIN — HEPARIN SODIUM 3000 UNITS: 1000 INJECTION INTRAVENOUS; SUBCUTANEOUS at 21:01

## 2020-01-01 RX ADMIN — HYDRALAZINE HYDROCHLORIDE 10 MG: 20 INJECTION INTRAMUSCULAR; INTRAVENOUS at 11:36

## 2020-01-01 RX ADMIN — FUROSEMIDE 40 MG: 10 INJECTION, SOLUTION INTRAMUSCULAR; INTRAVENOUS at 18:02

## 2020-01-01 RX ADMIN — ACETAMINOPHEN 650 MG: 325 TABLET, FILM COATED ORAL at 11:00

## 2020-01-01 RX ADMIN — LAMOTRIGINE 100 MG: 100 TABLET ORAL at 21:41

## 2020-01-01 RX ADMIN — LAMOTRIGINE 200 MG: 100 TABLET ORAL at 08:37

## 2020-01-01 RX ADMIN — LORAZEPAM 1 MG: 2 INJECTION INTRAMUSCULAR; INTRAVENOUS at 09:36

## 2020-01-01 RX ADMIN — LOSARTAN POTASSIUM 100 MG: 50 TABLET, FILM COATED ORAL at 07:59

## 2020-01-01 RX ADMIN — IPRATROPIUM BROMIDE AND ALBUTEROL SULFATE 3 ML: 2.5; .5 SOLUTION RESPIRATORY (INHALATION) at 07:05

## 2020-01-01 RX ADMIN — METHYLPREDNISOLONE SODIUM SUCCINATE 40 MG: 40 INJECTION, POWDER, FOR SOLUTION INTRAMUSCULAR; INTRAVENOUS at 05:22

## 2020-01-01 RX ADMIN — POLYETHYLENE GLYCOL 3350 17 G: 17 POWDER, FOR SOLUTION ORAL at 08:07

## 2020-01-01 RX ADMIN — IPRATROPIUM BROMIDE AND ALBUTEROL SULFATE 3 ML: 2.5; .5 SOLUTION RESPIRATORY (INHALATION) at 11:04

## 2020-01-01 RX ADMIN — LAMOTRIGINE 200 MG: 100 TABLET ORAL at 07:54

## 2020-01-01 RX ADMIN — POTASSIUM CHLORIDE 10 MEQ: 10 INJECTION, SOLUTION INTRAVENOUS at 09:33

## 2020-01-01 RX ADMIN — KETOROLAC TROMETHAMINE 15 MG: 15 INJECTION, SOLUTION INTRAMUSCULAR; INTRAVENOUS at 17:23

## 2020-01-01 RX ADMIN — ACETAMINOPHEN 650 MG: 325 TABLET, FILM COATED ORAL at 04:57

## 2020-01-01 RX ADMIN — SODIUM BICARBONATE 50 MEQ: 84 INJECTION, SOLUTION INTRAVENOUS at 09:12

## 2020-01-01 RX ADMIN — POTASSIUM CHLORIDE 10 MEQ: 10 INJECTION, SOLUTION INTRAVENOUS at 16:50

## 2020-01-01 RX ADMIN — OXYCODONE HYDROCHLORIDE 10 MG: 5 TABLET ORAL at 04:56

## 2020-01-01 RX ADMIN — LINEZOLID 600 MG: 600 INJECTION, SOLUTION INTRAVENOUS at 16:01

## 2020-01-01 RX ADMIN — METHYLPREDNISOLONE SODIUM SUCCINATE 40 MG: 40 INJECTION, POWDER, FOR SOLUTION INTRAMUSCULAR; INTRAVENOUS at 08:36

## 2020-01-01 RX ADMIN — FUROSEMIDE 40 MG: 10 INJECTION, SOLUTION INTRAMUSCULAR; INTRAVENOUS at 21:27

## 2020-01-01 RX ADMIN — PIPERACILLIN AND TAZOBACTAM 3.38 G: 3; .375 INJECTION, POWDER, FOR SOLUTION INTRAVENOUS at 13:13

## 2020-01-01 RX ADMIN — IPRATROPIUM BROMIDE AND ALBUTEROL SULFATE 3 ML: 2.5; .5 SOLUTION RESPIRATORY (INHALATION) at 15:37

## 2020-01-01 RX ADMIN — HEPARIN SODIUM 5000 UNITS: 5000 INJECTION INTRAVENOUS; SUBCUTANEOUS at 21:41

## 2020-01-01 RX ADMIN — PAROXETINE HYDROCHLORIDE 40 MG: 20 TABLET, FILM COATED ORAL at 07:54

## 2020-01-01 RX ADMIN — IPRATROPIUM BROMIDE AND ALBUTEROL SULFATE 3 ML: 2.5; .5 SOLUTION RESPIRATORY (INHALATION) at 06:56

## 2020-01-01 RX ADMIN — OXYCODONE HYDROCHLORIDE 10 MG: 5 TABLET ORAL at 11:07

## 2020-01-01 RX ADMIN — MORPHINE SULFATE 15 MG: 15 TABLET, EXTENDED RELEASE ORAL at 20:11

## 2020-01-01 RX ADMIN — Medication 10 ML: at 09:21

## 2020-01-01 RX ADMIN — BUDESONIDE 0.5 MG: 0.5 INHALANT RESPIRATORY (INHALATION) at 19:00

## 2020-01-01 RX ADMIN — ROFLUMILAST 500 MCG: 500 TABLET ORAL at 08:20

## 2020-01-01 RX ADMIN — POTASSIUM CHLORIDE 20 MEQ: 1500 TABLET, EXTENDED RELEASE ORAL at 15:39

## 2020-01-01 RX ADMIN — HEPARIN SODIUM 5000 UNITS: 5000 INJECTION INTRAVENOUS; SUBCUTANEOUS at 13:11

## 2020-01-01 RX ADMIN — FUROSEMIDE 40 MG: 10 INJECTION, SOLUTION INTRAMUSCULAR; INTRAVENOUS at 05:10

## 2020-01-01 RX ADMIN — ALPRAZOLAM 0.5 MG: 0.5 TABLET ORAL at 08:30

## 2020-01-01 RX ADMIN — METHYLPREDNISOLONE SODIUM SUCCINATE 40 MG: 40 INJECTION, POWDER, FOR SOLUTION INTRAMUSCULAR; INTRAVENOUS at 06:18

## 2020-01-01 RX ADMIN — Medication 10 ML: at 21:05

## 2020-01-01 RX ADMIN — NITROGLYCERIN 1 INCH: 20 OINTMENT TOPICAL at 01:51

## 2020-01-01 RX ADMIN — CEFEPIME HYDROCHLORIDE 2 G: 2 INJECTION, POWDER, FOR SOLUTION INTRAVENOUS at 18:16

## 2020-01-01 RX ADMIN — LAMOTRIGINE 100 MG: 100 TABLET ORAL at 23:03

## 2020-01-01 RX ADMIN — LAMOTRIGINE 200 MG: 100 TABLET ORAL at 08:58

## 2020-01-01 RX ADMIN — FUROSEMIDE 40 MG: 40 TABLET ORAL at 15:39

## 2020-01-01 RX ADMIN — LINEZOLID 600 MG: 600 INJECTION, SOLUTION INTRAVENOUS at 15:49

## 2020-01-01 RX ADMIN — IPRATROPIUM BROMIDE AND ALBUTEROL SULFATE 3 ML: 2.5; .5 SOLUTION RESPIRATORY (INHALATION) at 07:35

## 2020-01-01 RX ADMIN — HALOPERIDOL LACTATE 2 MG: 5 INJECTION, SOLUTION INTRAMUSCULAR at 03:25

## 2020-01-01 RX ADMIN — METOPROLOL SUCCINATE 50 MG: 50 TABLET, EXTENDED RELEASE ORAL at 08:59

## 2020-01-01 RX ADMIN — METHYLPREDNISOLONE SODIUM SUCCINATE 40 MG: 40 INJECTION, POWDER, FOR SOLUTION INTRAMUSCULAR; INTRAVENOUS at 18:00

## 2020-01-01 RX ADMIN — Medication 0.07 MCG/KG/MIN: at 01:09

## 2020-01-01 RX ADMIN — ROFLUMILAST 500 MCG: 500 TABLET ORAL at 08:58

## 2020-01-01 RX ADMIN — IOPAMIDOL 100 ML: 755 INJECTION, SOLUTION INTRAVENOUS at 10:15

## 2020-01-01 RX ADMIN — MORPHINE SULFATE 10 MG: 100 SOLUTION ORAL at 04:57

## 2020-01-01 RX ADMIN — ACETAMINOPHEN 650 MG: 650 SUPPOSITORY RECTAL at 04:03

## 2020-01-01 RX ADMIN — POTASSIUM CHLORIDE 40 MEQ: 1.5 FOR SOLUTION ORAL at 18:27

## 2020-01-01 RX ADMIN — MORPHINE SULFATE 15 MG: 15 TABLET, EXTENDED RELEASE ORAL at 20:17

## 2020-01-01 RX ADMIN — IPRATROPIUM BROMIDE AND ALBUTEROL SULFATE 3 ML: 2.5; .5 SOLUTION RESPIRATORY (INHALATION) at 18:25

## 2020-01-01 RX ADMIN — METHYLPREDNISOLONE SODIUM SUCCINATE 40 MG: 40 INJECTION, POWDER, FOR SOLUTION INTRAMUSCULAR; INTRAVENOUS at 08:07

## 2020-01-01 RX ADMIN — IPRATROPIUM BROMIDE AND ALBUTEROL SULFATE 3 ML: 2.5; .5 SOLUTION RESPIRATORY (INHALATION) at 19:36

## 2020-01-01 RX ADMIN — MORPHINE SULFATE 15 MG: 15 TABLET, EXTENDED RELEASE ORAL at 08:30

## 2020-01-01 RX ADMIN — LAMOTRIGINE 200 MG: 100 TABLET ORAL at 08:07

## 2020-01-01 RX ADMIN — BUDESONIDE 0.5 MG: 0.5 INHALANT RESPIRATORY (INHALATION) at 19:36

## 2020-01-01 RX ADMIN — HEPARIN SODIUM 5000 UNITS: 5000 INJECTION INTRAVENOUS; SUBCUTANEOUS at 21:52

## 2020-01-01 RX ADMIN — AMLODIPINE BESYLATE 10 MG: 5 TABLET ORAL at 10:19

## 2020-01-01 RX ADMIN — BUDESONIDE 0.5 MG: 0.5 INHALANT RESPIRATORY (INHALATION) at 06:56

## 2020-01-01 RX ADMIN — AMIODARONE HYDROCHLORIDE 150 MG: 50 INJECTION, SOLUTION INTRAVENOUS at 09:13

## 2020-01-01 RX ADMIN — Medication 1 MG: at 09:06

## 2020-01-01 RX ADMIN — ACETAMINOPHEN 650 MG: 325 TABLET, FILM COATED ORAL at 21:40

## 2020-01-01 RX ADMIN — INSULIN LISPRO 2 UNITS: 100 INJECTION, SOLUTION INTRAVENOUS; SUBCUTANEOUS at 17:56

## 2020-01-01 RX ADMIN — SODIUM CHLORIDE 1 MCG/KG/HR: 9 INJECTION, SOLUTION INTRAVENOUS at 19:18

## 2020-01-01 RX ADMIN — IPRATROPIUM BROMIDE AND ALBUTEROL SULFATE 3 ML: 2.5; .5 SOLUTION RESPIRATORY (INHALATION) at 11:05

## 2020-01-01 RX ADMIN — BUDESONIDE 0.5 MG: 0.5 INHALANT RESPIRATORY (INHALATION) at 19:01

## 2020-01-01 RX ADMIN — FUROSEMIDE 40 MG: 10 INJECTION, SOLUTION INTRAMUSCULAR; INTRAVENOUS at 14:19

## 2020-01-01 RX ADMIN — METOPROLOL TARTRATE 50 MG: 50 TABLET, FILM COATED ORAL at 08:07

## 2020-01-01 RX ADMIN — MIDODRINE HYDROCHLORIDE 10 MG: 5 TABLET ORAL at 21:26

## 2020-01-01 RX ADMIN — MORPHINE SULFATE 15 MG: 15 TABLET, EXTENDED RELEASE ORAL at 08:59

## 2020-01-01 RX ADMIN — AMIODARONE HYDROCHLORIDE 100 MG: 200 TABLET ORAL at 10:58

## 2020-01-01 RX ADMIN — IPRATROPIUM BROMIDE AND ALBUTEROL SULFATE 3 ML: .5; 3 SOLUTION RESPIRATORY (INHALATION) at 17:30

## 2020-01-01 RX ADMIN — OXYCODONE HYDROCHLORIDE 10 MG: 5 TABLET ORAL at 22:55

## 2020-01-01 RX ADMIN — POTASSIUM CHLORIDE 10 MEQ: 10 INJECTION, SOLUTION INTRAVENOUS at 11:10

## 2020-01-01 RX ADMIN — IPRATROPIUM BROMIDE AND ALBUTEROL SULFATE 3 ML: 2.5; .5 SOLUTION RESPIRATORY (INHALATION) at 18:45

## 2020-01-01 RX ADMIN — HEPARIN SODIUM 4000 UNITS: 1000 INJECTION INTRAVENOUS; SUBCUTANEOUS at 22:17

## 2020-01-01 RX ADMIN — PAROXETINE HYDROCHLORIDE 40 MG: 20 TABLET, FILM COATED ORAL at 07:59

## 2020-01-01 RX ADMIN — BUDESONIDE 0.5 MG: 0.5 INHALANT RESPIRATORY (INHALATION) at 07:35

## 2020-01-01 RX ADMIN — LAMOTRIGINE 200 MG: 100 TABLET ORAL at 08:11

## 2020-01-01 RX ADMIN — Medication 10 ML: at 22:32

## 2020-01-01 RX ADMIN — IPRATROPIUM BROMIDE AND ALBUTEROL SULFATE 3 ML: 2.5; .5 SOLUTION RESPIRATORY (INHALATION) at 11:18

## 2020-01-01 RX ADMIN — PANTOPRAZOLE SODIUM 40 MG: 40 INJECTION, POWDER, FOR SOLUTION INTRAVENOUS at 08:37

## 2020-01-01 RX ADMIN — Medication 10 ML: at 09:58

## 2020-01-01 RX ADMIN — NITROGLYCERIN 1 INCH: 20 OINTMENT TOPICAL at 13:50

## 2020-01-01 RX ADMIN — POTASSIUM CHLORIDE 10 MEQ: 10 INJECTION, SOLUTION INTRAVENOUS at 11:41

## 2020-01-01 RX ADMIN — BUDESONIDE 0.5 MG: 0.5 INHALANT RESPIRATORY (INHALATION) at 19:11

## 2020-01-01 RX ADMIN — SODIUM CHLORIDE 7.5 MG/HR: 9 INJECTION, SOLUTION INTRAVENOUS at 18:27

## 2020-01-01 RX ADMIN — DOCUSATE SODIUM 100 MG: 50 LIQUID ORAL at 09:41

## 2020-01-01 RX ADMIN — OXYCODONE HYDROCHLORIDE 10 MG: 5 TABLET ORAL at 03:45

## 2020-01-01 RX ADMIN — LINEZOLID 600 MG: 600 INJECTION, SOLUTION INTRAVENOUS at 03:47

## 2020-01-01 RX ADMIN — HEPARIN SODIUM 5000 UNITS: 5000 INJECTION INTRAVENOUS; SUBCUTANEOUS at 14:50

## 2020-01-01 RX ADMIN — SODIUM CHLORIDE 40 MG/HR: 9 INJECTION, SOLUTION INTRAVENOUS at 13:51

## 2020-01-01 RX ADMIN — LAMOTRIGINE 100 MG: 100 TABLET ORAL at 21:04

## 2020-01-01 RX ADMIN — HYDRALAZINE HYDROCHLORIDE 10 MG: 20 INJECTION INTRAMUSCULAR; INTRAVENOUS at 05:30

## 2020-01-01 RX ADMIN — IPRATROPIUM BROMIDE AND ALBUTEROL SULFATE 3 ML: 2.5; .5 SOLUTION RESPIRATORY (INHALATION) at 15:31

## 2020-01-01 RX ADMIN — MORPHINE SULFATE 15 MG: 15 TABLET, EXTENDED RELEASE ORAL at 09:14

## 2020-01-01 RX ADMIN — DOCUSATE SODIUM 100 MG: 50 LIQUID ORAL at 21:04

## 2020-01-01 RX ADMIN — HEPARIN SODIUM 5000 UNITS: 5000 INJECTION INTRAVENOUS; SUBCUTANEOUS at 21:26

## 2020-01-01 RX ADMIN — SODIUM CHLORIDE 20 MG/HR: 9 INJECTION, SOLUTION INTRAVENOUS at 18:23

## 2020-01-01 RX ADMIN — FUROSEMIDE 40 MG: 10 INJECTION, SOLUTION INTRAMUSCULAR; INTRAVENOUS at 21:10

## 2020-01-01 RX ADMIN — POTASSIUM CHLORIDE 20 MEQ: 1500 TABLET, EXTENDED RELEASE ORAL at 09:14

## 2020-01-01 RX ADMIN — SODIUM CHLORIDE 5 MG/HR: 9 INJECTION, SOLUTION INTRAVENOUS at 04:02

## 2020-01-01 RX ADMIN — POTASSIUM CHLORIDE 10 MEQ: 10 INJECTION, SOLUTION INTRAVENOUS at 13:39

## 2020-01-01 RX ADMIN — MIDODRINE HYDROCHLORIDE 15 MG: 5 TABLET ORAL at 21:26

## 2020-01-01 RX ADMIN — LAMOTRIGINE 200 MG: 100 TABLET ORAL at 08:00

## 2020-01-01 RX ADMIN — PAROXETINE HYDROCHLORIDE 40 MG: 20 TABLET, FILM COATED ORAL at 09:24

## 2020-01-01 RX ADMIN — Medication 10 ML: at 21:55

## 2020-01-01 RX ADMIN — PANTOPRAZOLE SODIUM 40 MG: 40 INJECTION, POWDER, FOR SOLUTION INTRAVENOUS at 21:09

## 2020-01-01 RX ADMIN — MORPHINE SULFATE 10 MG: 100 SOLUTION ORAL at 09:25

## 2020-01-01 RX ADMIN — NITROGLYCERIN 1 INCH: 20 OINTMENT TOPICAL at 15:39

## 2020-01-01 RX ADMIN — METHYLPREDNISOLONE SODIUM SUCCINATE 40 MG: 40 INJECTION, POWDER, FOR SOLUTION INTRAMUSCULAR; INTRAVENOUS at 17:05

## 2020-01-01 RX ADMIN — LORAZEPAM 1 MG: 2 INJECTION INTRAMUSCULAR at 09:36

## 2020-01-01 RX ADMIN — PANTOPRAZOLE SODIUM 40 MG: 40 INJECTION, POWDER, FOR SOLUTION INTRAVENOUS at 08:07

## 2020-01-01 RX ADMIN — CEFTRIAXONE SODIUM 2 G: 2 INJECTION, POWDER, FOR SOLUTION INTRAMUSCULAR; INTRAVENOUS at 12:52

## 2020-01-01 RX ADMIN — BUDESONIDE 0.5 MG: 0.5 INHALANT RESPIRATORY (INHALATION) at 06:29

## 2020-01-01 RX ADMIN — IPRATROPIUM BROMIDE AND ALBUTEROL SULFATE 3 ML: 2.5; .5 SOLUTION RESPIRATORY (INHALATION) at 15:40

## 2020-01-01 RX ADMIN — NITROGLYCERIN 1 INCH: 20 OINTMENT TOPICAL at 00:31

## 2020-01-01 RX ADMIN — Medication 10 ML: at 20:17

## 2020-01-01 RX ADMIN — LOSARTAN POTASSIUM 50 MG: 25 TABLET, FILM COATED ORAL at 11:15

## 2020-01-01 RX ADMIN — POTASSIUM CHLORIDE 10 MEQ: 10 INJECTION, SOLUTION INTRAVENOUS at 08:57

## 2020-01-06 NOTE — ASSESSMENT & PLAN NOTE
Patient has both degenerative disc disease and degenerative joint disease in his lower back.  He has severe facet arthropathy.  He has intractable back pain at times with radiation down his legs.  He has been a surgical candidate several times but this is not been evaluated recently because the pain has remained in his back and not been down the leg.

## 2020-01-06 NOTE — ASSESSMENT & PLAN NOTE
COPD is unchanged.  COPD information handout given.  Patient to keep COPD diary.  Discussed monitoring symptoms and use of quick-relief medications and contacting us early in the course of exacerbations.  Warning signs of respiratory distress were reviewed with the patient.   Counseled to avoid exposure to cigarette smoke.  Continue current medications.     Patient has obvious chronic obstructive pulmonary disease from years of cigarette smoking.  He has count of a dusky color and increased AP diameter.  He has inspiratory rales at both bases and expiratory wheezing.  Despite this he continues to smoke at least 3 cigarettes a day.  He has no interest in quitting.  He has a chronic daily cough productive of sputum.  He has some persistent end inspiratory wheezing.He will continue duo nebs via nebulizer at home.  He knows to call immediately if he sees a change in color of his sputum or fevers or increasing dyspnea.  We usually run him through some antibiotics and prednisone when that happens.

## 2020-01-06 NOTE — ASSESSMENT & PLAN NOTE
Psychological condition is improving with treatment.  Continue current treatment regimen.  Regular aerobic exercise.  Psychological condition  will be reassessed at the next regular appointment.    Patient has not had a true bipolar episode for several years now.  We will continue current therapy and hopefully not have to deal with this part again.  Seroquel, Paxil, and Lamictal all seem to be contributing to his improved psychiatric outlook.

## 2020-02-26 NOTE — TELEPHONE ENCOUNTER
PATIENT WIFE CALLED STATING THAT THE PRESCRIPTION FOR Morphine (MS CONTIN) 15 MG 12 hr tablet DID NOT GET RECEIVED TO [facility]. PLEASE ADVISE.

## 2020-03-11 NOTE — TELEPHONE ENCOUNTER
PT CALLED REQUESTING A REFILL ON oxyCODONE-acetaminophen (PERCOCET)  MG per tablet    [facility] ON [address] CONFIRMED.       PT CALL BACK [phone]

## 2020-03-13 NOTE — TELEPHONE ENCOUNTER
PTS WIFE CALLED AND STATED THAT PATIENT HAS COPD  AND USES HOME NEBULIZER BREATHING TREATMENTS 4X A DAY.  HE IS STILL HAVING DIFFICULTY BREATHING AFTER TREATMENT. PTS WIFE IS WONDERING IF THEY SHOULD DO M0RE BREATHING TREATMENTS A DAY OR SOMETHING ELSE CAN BE USED IN ADDITION TO BREATHING TREATMENTS     PLEASE ADVISE      [phone]

## 2020-03-15 PROBLEM — J96.12 CHRONIC RESPIRATORY FAILURE WITH HYPERCAPNIA (HCC): Chronic | Status: ACTIVE | Noted: 2020-01-01

## 2020-03-15 PROBLEM — J44.1 COPD EXACERBATION (HCC): Status: ACTIVE | Noted: 2020-01-01

## 2020-03-15 NOTE — ED PROVIDER NOTES
Subjective   Chief complaint: Patient presents today with shortness of breath.  This is been progressive to the point that he cannot stay at home anymore.  He has a history of COPD and this feels like a COPD.  He has not had any travel.  Been coughing up thick mucus.  Patient lives with family he however has had no recent travel he does not leave the house.  No risk factors at this point in time for Coveid 19.    Context: He has a history of COPD and has had progressive symptoms.    Duration: 7 days    Timing: progressive    Severity: 10 out of 10    Associated Symptoms: He is talking about some midsternal chest discomfort as well.  This is been there for couple of days.        PCP:            Review of Systems   Constitutional: Negative.    HENT: Negative.    Eyes: Negative.    Respiratory: Positive for cough, shortness of breath and wheezing.    Cardiovascular: Positive for chest pain.   Genitourinary: Negative.    Musculoskeletal: Positive for back pain.   Skin: Negative.    Neurological: Negative.    Hematological: Negative.    Psychiatric/Behavioral: Negative.        Past Medical History:   Diagnosis Date   • Bipolar affective disorder (CMS/H[facility])    • Sepsis (CMS/[facility])        No Known Allergies    Past Surgical History:   Procedure Laterality Date   • ABDOMINAL AORTIC ANEURYSM REPAIR     • CHOLECYSTECTOMY     • CORONARY ANGIOPLASTY     • CORONARY ARTERY BYPASS GRAFT  03/13/2014   • THORACOTOMY Right    • TOTAL HIP ARTHROPLASTY Left    • TOTAL HIP ARTHROPLASTY Right 02/03/2014       Family History   Problem Relation Age of Onset   • Heart disease Other    • Hypertension Other    • Hyperlipidemia Other        Social History     Socioeconomic History   • Marital status:      Spouse name: Not on file   • Number of children: Not on file   • Years of education: Not on file   • Highest education level: Not on file   Tobacco Use   • Smoking status: Current Every Day Smoker   • Smokeless tobacco: Never Used            Objective   Physical Exam   Constitutional: He is oriented to person, place, and time. He appears distressed.   HENT:   Head: Normocephalic.   Eyes: Pupils are equal, round, and reactive to light.   Pulmonary/Chest: He has decreased breath sounds. He has wheezes in the right upper field, the right middle field, the right lower field, the left upper field, the left middle field and the left lower field.   Abdominal: Soft. Bowel sounds are normal.   Musculoskeletal:        Right lower leg: Normal.        Left lower leg: Normal.   Neurological: He is alert and oriented to person, place, and time.   Skin: Skin is warm and dry. Capillary refill takes less than 2 seconds.   Psychiatric: He has a normal mood and affect. His behavior is normal.       Procedures           ED Course      Results for orders placed or performed during the hospital encounter of 03/15/20   Comprehensive Metabolic Panel   Result Value Ref Range    Glucose 121 (H) 65 - 99 mg/dL    BUN 16 8 - 23 mg/dL    Creatinine 0.94 0.76 - 1.27 mg/dL    Sodium 141 136 - 145 mmol/L    Potassium 4.7 3.5 - 5.2 mmol/L    Chloride 95 (L) 98 - 107 mmol/L    CO2 34.0 (H) 22.0 - 29.0 mmol/L    Calcium 9.3 8.6 - 10.5 mg/dL    Total Protein 8.3 6.0 - 8.5 g/dL    Albumin 4.40 3.50 - 5.20 g/dL    ALT (SGPT) 16 1 - 41 U/L    AST (SGOT) 24 1 - 40 U/L    Alkaline Phosphatase 142 (H) 39 - 117 U/L    Total Bilirubin 0.3 0.2 - 1.2 mg/dL    eGFR Non African Amer 78 >60 mL/min/1.73    Globulin 3.9 gm/dL    A/G Ratio 1.1 g/dL    BUN/Creatinine Ratio 17.0 7.0 - 25.0    Anion Gap 12.0 5.0 - 15.0 mmol/L   BNP   Result Value Ref Range    proBNP 6,123.0 (H) 5.0-1,800.0 pg/mL   Troponin   Result Value Ref Range    Troponin T <0.010 0.000 - 0.030 ng/mL   Protime-INR   Result Value Ref Range    Protime 11.5 9.6 - 11.7 Seconds    INR 1.11 (H) 0.90 - 1.10   CBC Auto Differential   Result Value Ref Range    WBC 6.50 3.40 - 10.80 10*3/mm3    RBC 3.88 (L) 4.14 - 5.80 10*6/mm3     Hemoglobin 12.0 (L) 13.0 - 17.7 g/dL    Hematocrit 34.8 (L) 37.5 - 51.0 %    MCV 89.6 79.0 - 97.0 fL    MCH 30.9 26.6 - 33.0 pg    MCHC 34.5 31.5 - 35.7 g/dL    RDW 15.0 12.3 - 15.4 %    RDW-SD 47.7 37.0 - 54.0 fl    MPV 7.3 6.0 - 12.0 fL    Platelets 305 140 - 450 10*3/mm3    Neutrophil % 88.1 (H) 42.7 - 76.0 %    Lymphocyte % 8.6 (L) 19.6 - 45.3 %    Monocyte % 2.8 (L) 5.0 - 12.0 %    Eosinophil % 0.1 (L) 0.3 - 6.2 %    Basophil % 0.4 0.0 - 1.5 %    Neutrophils, Absolute 5.80 1.70 - 7.00 10*3/mm3    Lymphocytes, Absolute 0.60 (L) 0.70 - 3.10 10*3/mm3    Monocytes, Absolute 0.20 0.10 - 0.90 10*3/mm3    Eosinophils, Absolute 0.00 0.00 - 0.40 10*3/mm3    Basophils, Absolute 0.00 0.00 - 0.20 10*3/mm3    nRBC 0.0 0.0 - 0.2 /100 WBC   Blood Gas, Arterial   Result Value Ref Range    Site Right Radial     Andres's Test Positive     pH, Arterial 7.330 (L) 7.350 - 7.450 pH units    pCO2, Arterial 72.3 (C) 35.0 - 48.0 mm Hg    pO2, Arterial 190.4 (H) 83.0 - 108.0 mm Hg    HCO3, Arterial 38.1 (H) 21.0 - 28.0 mmol/L    Base Excess, Arterial 9.4 (H) 0.0 - 3.0 mmol/L    O2 Saturation, Arterial 99.5 (H) 94.0 - 98.0 %    CO2 Content 40.4 (H) 22 - 29 mmol/L    Barometric Pressure for Blood Gas      Modality Cannula     FIO2 32 %    Hemodilution No           Xr Chest 1 View    Result Date: 3/15/2020   1. Cardiomegaly. 2. Prominent interstitial markings. 3. Right basilar scarring.  Electronically Signed By-Himanshu Pierce On:3/15/2020 5:51 PM This report was finalized on [id number] by  Himanshu Pierce, .       ekg nsr lvh anterior changes                             MDM  Number of Diagnoses or Management Options  Diagnosis management comments: Patient has elevated BNP and signs of congestive heart failure.  This that is intermixed with his COPD.  At this point time his CO2 is a little elevated and he has having some difficulty with his breathing.  He is awake and oriented and compensated so far however I would admit him to the hospital at  this point and treat his COPD he gets some Lasix for CHF.  I do not feel that he is ready for discharge.       Amount and/or Complexity of Data Reviewed  Clinical lab tests: reviewed  Tests in the radiology section of CPT®: reviewed  Discuss the patient with other providers: yes  Independent visualization of images, tracings, or specimens: yes    Patient Progress  Patient progress: stable      Final diagnoses:   None     chf  copd       Conor Coyne, DO  03/15/20 1759       Conor Coyne, DO  03/15/20 1800       Conor Coyne, DO  03/15/20 1800       Conor Coyne, DO  03/15/20 1829

## 2020-03-15 NOTE — H&P
[facility] Medicine Services      Patient Name: Adolfo Alejandro  : 1945  MRN: [id number]  Primary Care Physician: Adama Vilchis MD  Date of admission: 3/15/2020    Patient Care Team:  Adama Vilchis MD as PCP - General          Subjective   History Present Illness     Chief Complaint:   Chief Complaint   Patient presents with   • Shortness of Breath       Mr. Alejandro is a 74 y.o.  male with a history of COPD, CHF, atrial fibrillation, chronic back pain, chronic respiratory failure, requiring home oxygen, , bipolar disorder and anxiety presented to the [facility] ED on 3/15/2020 with a complaint of shortness of breath,chest pain, and productive cough x7 days.  Patient states he got worse today and he had increase his oxygen to 3 L from his normal 2 L per nasal cannula.  Patient denies fever, ill contacts, palpitations, and fatigue.      In the ED, CXR: Cardiomegaly, no acute active disease.  Initial troponin negative, BNP 6123,  BUN 16, creatinine 0.94,Potassium 4.7, WBC 6.5, Hgb 12.0, HCT 34.8, respiratory panel negative, EKG: Normal sinus rhythm.  pH 7.33, PCO2 72.3.  Patient given DuoNeb, Solu-Medrol 125 mg IV, Lasix 40 mg IV, 1 inch Nitropaste to chest wall, Norco 5-325 mg p.o. x1, Toradol 15 mg IV x1.  Patient will be admitted for further evaluation and management      Review of Systems   Constitution: Negative.   HENT: Positive for congestion.    Eyes: Negative.    Cardiovascular: Positive for chest pain.   Respiratory: Positive for cough, shortness of breath, sputum production and wheezing.    Endocrine: Negative.    Hematologic/Lymphatic: Negative.    Skin: Negative.    Musculoskeletal: Negative.    Gastrointestinal: Negative.    Genitourinary: Negative.    Neurological: Negative.    Psychiatric/Behavioral: Negative.    Allergic/Immunologic: Negative.    All other systems reviewed and are negative.          Personal History     Past Medical  History:   Past Medical History:   Diagnosis Date   • Anxiety    • Bipolar affective disorder (CMS/[facility])    • CHF (congestive heart failure) (CMS/[facility])    • COPD (chronic obstructive pulmonary disease) (CMS/[facility])    • GERD (gastroesophageal reflux disease)    • Sepsis (CMS/[facility])        Surgical History:      Past Surgical History:   Procedure Laterality Date   • ABDOMINAL AORTIC ANEURYSM REPAIR     • CHOLECYSTECTOMY     • CORONARY ANGIOPLASTY     • CORONARY ARTERY BYPASS GRAFT  03/13/2014   • THORACOTOMY Right    • TOTAL HIP ARTHROPLASTY Left    • TOTAL HIP ARTHROPLASTY Right 02/03/2014           Family History: family history includes Heart disease in an other family member; Hyperlipidemia in an other family member; Hypertension in an other family member. Otherwise pertinent FHx was reviewed and unremarkable.     Social History:  reports that he has been smoking. He has never used smokeless tobacco. He reports that he drank alcohol. He reports that he has current or past drug history.      Medications:  Prior to Admission medications    Medication Sig Start Date End Date Taking? Authorizing Provider   amiodarone (PACERONE) 100 MG tablet Take 100 mg by mouth Daily.   Yes Miguelina Marina MD   budesonide (PULMICORT) 0.5 MG/2ML nebulizer solution Use bid VIA nebulizor   Dx j44.9 3/13/20  Yes Adama Vilchis MD   ipratropium-albuterol (DUO-NEB) 0.5-2.5 mg/3 ml nebulizer Take 3 mL by nebulization 4 (Four) Times a Day for 90 days.  Patient taking differently: Take 3 mL by nebulization 4 (Four) Times a Day As Needed for Wheezing or Shortness of Air. 12/23/19 3/22/20 Yes Adama Vilchis MD   lamoTRIgine (LaMICtal) 100 MG tablet Take 200 mg by mouth Every Morning. 11/18/19  Yes Miguelina Marina MD   lamoTRIgine (LaMICtal) 100 MG tablet Take 100 mg by mouth Every Evening.   Yes Miguelina Marina MD   losartan (COZAAR) 50 MG tablet Take 50 mg by mouth Daily.   Yes Miguelina Marina MD   melatonin 5 MG  tablet tablet Take 20 mg by mouth Every Night.   Yes Miguelina Marina MD   metoprolol succinate XL (TOPROL-XL) 100 MG 24 hr tablet Take 100 mg by mouth Daily.   Yes Miguelina Marina MD   Morphine (MS CONTIN) 15 MG 12 hr tablet Take 1 tablet by mouth 2 (Two) Times a Day. 2/26/20  Yes Adama Vilchis MD   oxyCODONE-acetaminophen (PERCOCET)  MG per tablet Take 1 tablet by mouth 5 (Five) Times a Day. 3/12/20  Yes Adama Vilchis MD   PARoxetine (PAXIL) 40 MG tablet Take 40 mg by mouth Daily. 10/3/19  Yes Miguelina Marina MD   predniSONE (DELTASONE) 20 MG tablet 3 qd x 2 days 2 po 4 days 1 po qd 4 days 1/2 qd 2 days 3/13/20  Yes Adama Vilchis MD   QUEtiapine (SEROquel) 50 MG tablet Take 50 mg by mouth Every Night. 11/21/19  Yes Miguelina Marina MD   roflumilast (DALIRESP) 500 MCG tablet tablet Take 1 tablet by mouth Daily. 3/13/20  Yes Adama Vilchis MD   losartan (COZAAR) 100 MG tablet TAKE 1 TABLET BY MOUTH EVERY DAY  Patient taking differently: TAKE 1/2 TABLET BY MOUTH EVERY DAY 11/11/19 3/15/20 Yes Adama Vilchis MD   metoprolol tartrate (LOPRESSOR) 50 MG tablet Take 50 mg by mouth.  3/15/20  Miguelina Marina MD   Multiple Vitamins-Minerals (MULTI VITAMIN/MINERALS) tablet MULTI VITAMIN/MINERALS TABS 1/16/12 3/15/20  Miguelina Marina MD   omeprazole (PrilOSEC) 20 MG capsule PRILOSEC 20 MG ORAL CAPSULE DELAYED RELEASE 2/19/15 3/15/20  Miguelina Marina MD       Allergies:  No Known Allergies    Objective   Objective     Vital Signs  Temp:  [97.2 °F (36.2 °C)] 97.2 °F (36.2 °C)  Heart Rate:  [60-69] 69  Resp:  [19-26] 22  BP: (186-196)/(79-84) 186/84  SpO2:  [91 %-100 %] 97 %  on  Flow (L/min):  [3] 3;   Device (Oxygen Therapy): nasal cannula  Body mass index is 22.05 kg/m².    Physical Exam   Constitutional: He is oriented to person, place, and time. He appears well-developed and well-nourished. No distress.   HENT:   Head: Normocephalic.   Eyes:  Conjunctivae are normal.   Neck: Normal range of motion.   Cardiovascular: Normal rate, regular rhythm, normal heart sounds and intact distal pulses.   Pulmonary/Chest: Effort normal. He has wheezes.   Abdominal: Soft. Bowel sounds are normal.   Musculoskeletal: Normal range of motion.   Neurological: He is alert and oriented to person, place, and time.   Skin: Skin is warm and dry.   Vitals reviewed.      Results Review:  I have personally reviewed most recent cardiac tracings, lab results, microbiology results and radiology images and interpretations and agree with findings    Results from last 7 days   Lab Units 03/15/20  1721   WBC 10*3/mm3 6.50   HEMOGLOBIN g/dL 12.0*   HEMATOCRIT % 34.8*   PLATELETS 10*3/mm3 305   INR  1.11*     Results from last 7 days   Lab Units 03/15/20  1721   SODIUM mmol/L 141   POTASSIUM mmol/L 4.7   CHLORIDE mmol/L 95*   CO2 mmol/L 34.0*   BUN mg/dL 16   CREATININE mg/dL 0.94   GLUCOSE mg/dL 121*   CALCIUM mg/dL 9.3   ALT (SGPT) U/L 16   AST (SGOT) U/L 24   TROPONIN T ng/mL <0.010   PROBNP pg/mL 6,123.0*     Estimated Creatinine Clearance: 63.2 mL/min (by C-G formula based on SCr of 0.94 mg/dL).  Brief Urine Lab Results     None          Microbiology Results (last 10 days)     ** No results found for the last 240 hours. **          ECG/EMG Results (most recent)     Procedure Component Value Units Date/Time    ECG 12 Lead [[id number]] Collected:  03/15/20 1654     Updated:  03/15/20 1656    Narrative:       HEART RATE= 62  bpm  RR Interval= 964  ms  DC Interval= 202  ms  P Horizontal Axis= -18  deg  P Front Axis= 30  deg  QRSD Interval= 118  ms  QT Interval= 435  ms  QRS Axis= -40  deg  T Wave Axis= 70  deg  - ABNORMAL ECG -  Sinus rhythm  Probable left atrial enlargement  Left anterior fascicular block  Left ventricular hypertrophy  Anterior Q waves, possibly due to LVH  Electronically Signed By:   Date and Time of Study: 2020-03-15 16:54:48                    Xr Chest 1 View    Result  Date: 3/15/2020   1. Cardiomegaly. 2. Prominent interstitial markings. 3. Right basilar scarring.  Electronically Signed By-Himanshu Pierce On:3/15/2020 5:51 PM This report was finalized on [id number] by  Himanshu Pierce, .        Estimated Creatinine Clearance: 63.2 mL/min (by C-G formula based on SCr of 0.94 mg/dL).    Assessment/Plan   Assessment/Plan       Active Hospital Problems    Diagnosis  POA   • **Chronic obstructive pulmonary disease (CMS/HCC) [J44.9]  Yes     Priority: High   • Chronic respiratory failure with hypercapnia (CMS/HCC) [J96.12]  Yes     Priority: High   • COPD exacerbation (CMS/HCC) [J44.1]  Yes     Priority: Medium   • Atrial fibrillation (CMS/HCC) [I48.91]  Yes     Priority: Medium   • Nicotine dependence [F17.200]  Yes     Priority: Medium   • Insomnia [G47.00]  Yes     Priority: Medium   • Heart failure (CMS/HCC) [I50.9]  Yes     Priority: Medium   • Chronic back pain [M54.9, G89.29]  Yes     Priority: Medium   • Hypertension [I10]  Yes     Priority: Medium   • Bipolar affective disorder (CMS/HCC) [F31.9]  Yes     Priority: Medium   • Generalized anxiety disorder [F41.1]  Yes     Priority: Medium      Resolved Hospital Problems   No resolved problems to display.     COPD exacerbation  -Continue Pulmicort, DuoNebs PRN, Daliresp  -Continue Solu-Medrol  -Monitor oxygen keep sats 90 to 95%    Chronic respiratory failure  -Patient on home O2  -O2 2 L per nasal cannula  -Keep sats 90 to 95%    Congestive heart failure  -BNP 6123  -CXR: Cardiomegaly  -Received Lasix 40 mg IV x1  -EF 55% (5/11/2017)  -Obtain echo    Chest pain  -Initial troponin negative  -Check serial troponins  -Continue Nitropaste    Atrial fibrillation  -Continue Toprol XL, amiodarone    Hypertension  -Continue losartan, Toprol XL    Bipolar disorder  -Continue Lamictal, Paxil, Seroquel    Anxiety  -Continue Paxil    Chronic back pain  -Continue patient's MS Contin, oxycodone (INSPECT reviewed)    Tobacco dependence  -Smokes 3  cigarettes a day  -Encourage cessation    VTE Prophylaxis -   Mechanical Order History:      Ordered        03/15/20 1928  Place Sequential Compression Device  Once         03/15/20 1928  Maintain Sequential Compression Device  Continuous                 Pharmalogical Order History:     None          CODE STATUS:    Code Status and Medical Interventions:   Ordered at: 03/15/20 1929     Level Of Support Discussed With:    Patient     Code Status:    CPR     Medical Interventions (Level of Support Prior to Arrest):    Full         I discussed the patient's findings and my recommendations with patient.        Electronically signed by DEEPTI Bennett, 03/15/20, 7:32 PM.  [facility] Hospitalist Team

## 2020-03-15 NOTE — ED NOTES
Pt states that he has been short of breath over the last day. Reports increased work of breathing. Pt reports copd.     Jennifer Barnhart RN  03/15/20 6497

## 2020-03-16 NOTE — PROGRESS NOTES
Discharge Planning Assessment   Jose     Patient Name: Adolfo Alejandro  MRN: [id number]  Today's Date: 3/16/2020    Admit Date: 3/15/2020    Discharge Needs Assessment     Row Name 03/16/20 1137       Living Environment    Lives With  spouse    Current Living Arrangements  home/apartment/condo    Primary Care Provided by  self    Quality of Family Relationships  supportive    Able to Return to Prior Arrangements  yes    Row Name 03/16/20 1024       Discharge Needs Assessment    Readmission Within the Last 30 Days  no previous admission in last 30 days        Discharge Plan     Row Name 03/16/20 1140       Plan    Plan  D/C Plan : Anticipate home     Patient/Family in Agreement with Plan  yes    Plan Comments  Admitted for COPD Exacerbation.           Admission Type  observation    Arrived From  emergency department    Required Notices Provided  -- obs letter not given     Preferred Language  English     Used During This Interaction  no        Functional Status     Row Name 03/16/20 1140       Functional Status    Usual Activity Tolerance  good    Current Activity Tolerance  good       Functional Status, IADL    Medications  independent    Meal Preparation  independent    Housekeeping  independent    Laundry  independent    Shopping  independent       Mental Status    General Appearance WDL  WDL       Mental Status Summary    Recent Changes in Mental Status/Cognitive Functioning  no changes                Naheed Costa RN

## 2020-03-16 NOTE — PLAN OF CARE
Problem: Patient Care Overview  Goal: Plan of Care Review  Outcome: Ongoing (interventions implemented as appropriate)  Flowsheets (Taken 3/16/2020 1341)  Plan of Care Reviewed With: patient; spouse  Outcome Summary: patient is very anxious when he is awake. PRN xanax ordered. He gets very worked up and that elevates his blood pressure. He is cooperative but overactive and anxious. Vital signs are stable. 3 liters of oxygen utilized at this time. bed alrm in use. patient is very impulsive. will continue to monitor  Goal: Individualization and Mutuality  Outcome: Ongoing (interventions implemented as appropriate)  Goal: Discharge Needs Assessment  Outcome: Ongoing (interventions implemented as appropriate)  Goal: Interprofessional Rounds/Family Conf  Outcome: Ongoing (interventions implemented as appropriate)     Problem: Pain, Chronic (Adult)  Goal: Identify Related Risk Factors and Signs and Symptoms  Outcome: Ongoing (interventions implemented as appropriate)  Goal: Acceptable Pain/Comfort Level and Functional Ability  Outcome: Ongoing (interventions implemented as appropriate)     Problem: Breathing Pattern Ineffective (Adult)  Goal: Effective Oxygenation/Ventilation  Outcome: Ongoing (interventions implemented as appropriate)  Goal: Anxiety/Fear Reduction  Outcome: Ongoing (interventions implemented as appropriate)     Problem: Fall Risk (Adult)  Goal: Identify Related Risk Factors and Signs and Symptoms  Outcome: Ongoing (interventions implemented as appropriate)  Goal: Absence of Fall  Outcome: Ongoing (interventions implemented as appropriate)

## 2020-03-16 NOTE — PLAN OF CARE
Problem: Patient Care Overview  Goal: Plan of Care Review  Outcome: Ongoing (interventions implemented as appropriate)  Flowsheets  Taken 3/16/2020 0500  Progress: improving  Outcome Summary: Pt no calm, denies SOA, no Sx of dyspnea  Taken 3/15/2020 5055  Plan of Care Reviewed With: patient     Problem: Pain, Chronic (Adult)  Goal: Identify Related Risk Factors and Signs and Symptoms  Outcome: Ongoing (interventions implemented as appropriate)  Flowsheets (Taken 3/16/2020 0000)  Related Risk Factors (Chronic Pain): coping ineffective; nerve injury; psychosocial factor; traumatic injury  Signs and Symptoms (Chronic Pain): abnormal posturing/positioning; pacing/restlessness; impaired thought process/concentration; sleep pattern change; verbalization of pain/discomfort for a prolonged time period     Problem: Breathing Pattern Ineffective (Adult)  Goal: Identify Related Risk Factors and Signs and Symptoms  Outcome: Outcome(s) achieved  Flowsheets (Taken 3/16/2020 0000)  Related Risk Factors (Breathing Pattern Ineffective): advanced age; deconditioning; pain  Signs and Symptoms (Breathing Pattern Ineffective): activity intolerance; anxiousness; breathlessness; restlessness  Goal: Effective Oxygenation/Ventilation  Outcome: Ongoing (interventions implemented as appropriate)  Flowsheets (Taken 3/16/2020 0500)  Effective Oxygenation/Ventilation: making progress toward outcome  Goal: Anxiety/Fear Reduction  Outcome: Ongoing (interventions implemented as appropriate)

## 2020-03-16 NOTE — PROGRESS NOTES
[facility] Medicine Services Daily Progress Note      Hospitalist Team  LOS 0 days      Patient Care Team:  Adama Vilchis MD as PCP - General    Patient Location: 2111/1      Subjective   Subjective     Chief Complaint / Subjective  Chief Complaint   Patient presents with   • Shortness of Breath       Present on Admission:  • Chronic obstructive pulmonary disease with acute exacerbation (CMS/HCC)  • COPD exacerbation (CMS/HCC)  • Atrial fibrillation (CMS/HCC)  • Bipolar affective disorder (CMS/HCC)  • Chronic back pain  • Generalized anxiety disorder  • Hypertension  • Insomnia  • Heart failure (CMS/HCC)  • Nicotine dependence  • Chronic respiratory failure with hypercapnia (CMS/HCC)      Brief Synopsis of Hospital Course/HPI    Mr. Alejandro is a 74 y.o.  male with a history of COPD, CHF, atrial fibrillation, chronic back pain, chronic respiratory failure, requiring home oxygen, , bipolar disorder and anxiety presented to the [facility] ED on 3/15/2020 with a complaint of shortness of breath,chest pain, and productive cough x7 days.  Patient states he got worse today and he had increase his oxygen to 3 L from his normal 2 L per nasal cannula.  Patient denies fever, ill contacts, palpitations, and fatigue.       In the ED, CXR: Cardiomegaly, no acute active disease.  Initial troponin negative, BNP 6123,  BUN 16, creatinine 0.94,Potassium 4.7, WBC 6.5, Hgb 12.0, HCT 34.8, respiratory panel negative, EKG: Normal sinus rhythm.  pH 7.33, PCO2 72.3.  Patient given DuoNeb, Solu-Medrol 125 mg IV, Lasix 40 mg IV, 1 inch Nitropaste to chest wall, Norco 5-325 mg p.o. x1, Toradol 15 mg IV x1.  Patient will be admitted for further evaluation and management          Date:: March 16  Patient states he is feeling better with his breathing        ROS  A complete 12 system review was performed and is negative except as described in the history of presenting illness    Objective    "  Objective      Vital Signs  Temp:  [97.2 °F (36.2 °C)-97.9 °F (36.6 °C)] 97.8 °F (36.6 °C)  Heart Rate:  [52-70] 52  Resp:  [16-26] 16  BP: (160-211)/(63-84) 165/75  Oxygen Therapy  SpO2: 98 %  Pulse Oximetry Type: Continuous  Device (Oxygen Therapy): nasal cannula  Flow (L/min): 3  Flowsheet Rows      First Filed Value   Admission Height  172.7 cm (68\") Documented at 03/15/2020 1649   Admission Weight  65.8 kg (145 lb) Documented at 03/15/2020 1649        Intake & Output (last 3 days)       03/13 0701 - 03/14 0700 03/14 0701 - 03/15 0700 03/15 0701 - 03/16 0700 03/16 0701 - 03/17 0700    P.O.   360     Total Intake(mL/kg)   360 (5.5)     Urine (mL/kg/hr)   700     Total Output   700     Net   -340                 Lines, Drains & Airways    Active LDAs     None                  Physical Exam:  Patient is awake alert oriented x3  Pupils are equal round reactive to light extraocular movements intact throat is clear no JVD no lymphadenopathy  Neck is supple  Lungs are clear distant breath sounds mild sparse wheeze  Heart is regular rate rhythm normal S1-S2 no S3 no murmurs no rubs  Abdomen is soft nontender positive bowel sounds no rebound no guarding  Extremities shows no cyanosis clubbing edema  Neuro cranial nerves II through XII are intact there is no focal deficits   skin shows no petechiae or purpura        Procedures:              Results Review:     I reviewed the patient's new clinical results.      Lab Results (last 24 hours)     Procedure Component Value Units Date/Time    Troponin [[id number]]  (Normal) Collected:  03/16/20 0706    Specimen:  Blood Updated:  03/16/20 0742     Troponin T <0.010 ng/mL     Narrative:       Troponin T Reference Range:  <= 0.03 ng/mL-   Negative for AMI  >0.03 ng/mL-     Abnormal for myocardial necrosis.  Clinicians would have to utilize clinical acumen, EKG, Troponin and serial changes to determine if it is an Acute Myocardial Infarction or myocardial injury due to an " underlying chronic condition.       Results may be falsely decreased if patient taking Biotin.      Manual Differential [[id number]]  (Abnormal) Collected:  03/16/20 0037    Specimen:  Blood Updated:  03/16/20 0340     Neutrophil % 91.0 %      Lymphocyte % 8.0 %      Monocyte % 1.0 %      Neutrophils Absolute 4.28 10*3/mm3      Lymphocytes Absolute 0.38 10*3/mm3      Monocytes Absolute 0.05 10*3/mm3      RBC Morphology Normal     WBC Morphology Normal     Platelet Morphology Normal    CBC Auto Differential [[id number]]  (Abnormal) Collected:  03/16/20 0037    Specimen:  Blood Updated:  03/16/20 0340     WBC 4.70 10*3/mm3      RBC 3.41 10*6/mm3      Hemoglobin 10.4 g/dL      Hematocrit 30.4 %      MCV 89.1 fL      MCH 30.4 pg      MCHC 34.1 g/dL      RDW 15.0 %      RDW-SD 46.8 fl      MPV 7.6 fL      Platelets 258 10*3/mm3     Scan Slide [[id number]] Collected:  03/16/20 0037    Specimen:  Blood Updated:  03/16/20 0340     Scan Slide --     Comment: See Manual Differential Results       Troponin [[id number]]  (Normal) Collected:  03/16/20 0037    Specimen:  Blood Updated:  03/16/20 0216     Troponin T <0.010 ng/mL     Narrative:       Troponin T Reference Range:  <= 0.03 ng/mL-   Negative for AMI  >0.03 ng/mL-     Abnormal for myocardial necrosis.  Clinicians would have to utilize clinical acumen, EKG, Troponin and serial changes to determine if it is an Acute Myocardial Infarction or myocardial injury due to an underlying chronic condition.       Results may be falsely decreased if patient taking Biotin.      Basic Metabolic Panel [[id number]]  (Abnormal) Collected:  03/16/20 0037    Specimen:  Blood Updated:  03/16/20 0213     Glucose 137 mg/dL      BUN 19 mg/dL      Creatinine 0.86 mg/dL      Sodium 141 mmol/L      Potassium 3.9 mmol/L      Chloride 95 mmol/L      CO2 34.0 mmol/L      Calcium 8.9 mg/dL      eGFR Non African Amer 87 mL/min/1.73      BUN/Creatinine Ratio 22.1     Anion Gap 12.0 mmol/L     Narrative:        GFR Normal >60  Chronic Kidney Disease <60  Kidney Failure <15      Troponin [[id number]]  (Normal) Collected:  03/15/20 2105    Specimen:  Blood Updated:  03/15/20 2134     Troponin T <0.010 ng/mL     Narrative:       Troponin T Reference Range:  <= 0.03 ng/mL-   Negative for AMI  >0.03 ng/mL-     Abnormal for myocardial necrosis.  Clinicians would have to utilize clinical acumen, EKG, Troponin and serial changes to determine if it is an Acute Myocardial Infarction or myocardial injury due to an underlying chronic condition.       Results may be falsely decreased if patient taking Biotin.      Respiratory Panel, PCR - Swab, Nasopharynx [[id number]]  (Normal) Collected:  03/15/20 1838    Specimen:  Swab from Nasopharynx Updated:  03/15/20 2103     ADENOVIRUS, PCR Not Detected     Coronavirus 229E Not Detected     Coronavirus HKU1 Not Detected     Coronavirus NL63 Not Detected     Coronavirus OC43 Not Detected     Human Metapneumovirus Not Detected     Human Rhinovirus/Enterovirus Not Detected     Influenza B PCR Not Detected     Parainfluenza Virus 1 Not Detected     Parainfluenza Virus 2 Not Detected     Parainfluenza Virus 3 Not Detected     Parainfluenza Virus 4 Not Detected     Bordetella pertussis pcr Not Detected     Influenza A H1 2009 PCR Not Detected     Chlamydophila pneumoniae PCR Not Detected     Mycoplasma pneumo by PCR Not Detected     Influenza A PCR Not Detected     Influenza A H3 Not Detected     Influenza A H1 Not Detected     RSV, PCR Not Detected    Narrative:       The coronavirus on the RVP is NOT COVID-19 and is NOT indicative of infection with COVID-19.     [place] Draw [[id number]] Collected:  03/15/20 1721    Specimen:  Blood Updated:  03/15/20 1830    Narrative:       The following orders were created for panel order [place] Draw.  Procedure                               Abnormality         Status                     ---------                               -----------         ------                      Light Blue Top[[id number]]                                   Final result               Green Top (Gel)[[id number]]                                  Final result               Lavender Top[[id number]]                                     Final result               Gold Top - SST[[id number]]                                   Final result                 Please view results for these tests on the individual orders.    Light Blue Top [[id number]] Collected:  03/15/20 1721    Specimen:  Blood Updated:  03/15/20 1830     Extra Tube hold for add-on     Comment: Auto resulted       Lavender Top [[id number]] Collected:  03/15/20 1721    Specimen:  Blood Updated:  03/15/20 1830     Extra Tube hold for add-on     Comment: Auto resulted       Green Top (Gel) [[id number]] Collected:  03/15/20 1721    Specimen:  Blood Updated:  03/15/20 1830     Extra Tube Hold for add-ons.     Comment: Auto resulted.       Gold Top - SST [[id number]] Collected:  03/15/20 1721    Specimen:  Blood Updated:  03/15/20 1830     Extra Tube Hold for add-ons.     Comment: Auto resulted.       Blood Gas, Arterial [[id number]]  (Abnormal) Collected:  03/15/20 1734    Specimen:  Arterial Blood Updated:  03/15/20 1748     Site Right Radial     Andres's Test Positive     pH, Arterial 7.330 pH units      pCO2, Arterial 72.3 mm Hg      pO2, Arterial 190.4 mm Hg      HCO3, Arterial 38.1 mmol/L      Base Excess, Arterial 9.4 mmol/L      Comment: Serial Number: [id number]:  [id number]        O2 Saturation, Arterial 99.5 %      CO2 Content 40.4 mmol/L      Barometric Pressure for Blood Gas --     Comment: N/A        Modality Cannula     FIO2 32 %      Hemodilution No    Comprehensive Metabolic Panel [[id number]]  (Abnormal) Collected:  03/15/20 1721    Specimen:  Blood Updated:  03/15/20 1745     Glucose 121 mg/dL      BUN 16 mg/dL      Creatinine 0.94 mg/dL      Sodium 141 mmol/L      Potassium 4.7 mmol/L      Chloride 95 mmol/L      CO2 34.0 mmol/L      Calcium  9.3 mg/dL      Total Protein 8.3 g/dL      Albumin 4.40 g/dL      ALT (SGPT) 16 U/L      AST (SGOT) 24 U/L      Alkaline Phosphatase 142 U/L      Total Bilirubin 0.3 mg/dL      eGFR Non African Amer 78 mL/min/1.73      Globulin 3.9 gm/dL      A/G Ratio 1.1 g/dL      BUN/Creatinine Ratio 17.0     Anion Gap 12.0 mmol/L     Narrative:       GFR Normal >60  Chronic Kidney Disease <60  Kidney Failure <15      Troponin [[id number]]  (Normal) Collected:  03/15/20 1721    Specimen:  Blood Updated:  03/15/20 1745     Troponin T <0.010 ng/mL     Narrative:       Troponin T Reference Range:  <= 0.03 ng/mL-   Negative for AMI  >0.03 ng/mL-     Abnormal for myocardial necrosis.  Clinicians would have to utilize clinical acumen, EKG, Troponin and serial changes to determine if it is an Acute Myocardial Infarction or myocardial injury due to an underlying chronic condition.       Results may be falsely decreased if patient taking Biotin.      BNP [[id number]]  (Abnormal) Collected:  03/15/20 1721    Specimen:  Blood Updated:  03/15/20 1742     proBNP 6,123.0 pg/mL     Narrative:       Among patients with dyspnea, NT-proBNP is highly sensitive for the detection of acute congestive heart failure. In addition NT-proBNP of <300 pg/ml effectively rules out acute congestive heart failure with 99% negative predictive value.    Results may be falsely decreased if patient taking Biotin.      Protime-INR [[id number]]  (Abnormal) Collected:  03/15/20 1721    Specimen:  Blood Updated:  03/15/20 1732     Protime 11.5 Seconds      INR 1.11    CBC & Differential [[id number]] Collected:  03/15/20 1721    Specimen:  Blood Updated:  03/15/20 1726    Narrative:       The following orders were created for panel order CBC & Differential.  Procedure                               Abnormality         Status                     ---------                               -----------         ------                     CBC Auto Differential[[id number]]         Abnormal            Final result                 Please view results for these tests on the individual orders.    CBC Auto Differential [[id number]]  (Abnormal) Collected:  03/15/20 1721    Specimen:  Blood Updated:  03/15/20 1726     WBC 6.50 10*3/mm3      RBC 3.88 10*6/mm3      Hemoglobin 12.0 g/dL      Hematocrit 34.8 %      MCV 89.6 fL      MCH 30.9 pg      MCHC 34.5 g/dL      RDW 15.0 %      RDW-SD 47.7 fl      MPV 7.3 fL      Platelets 305 10*3/mm3      Neutrophil % 88.1 %      Lymphocyte % 8.6 %      Monocyte % 2.8 %      Eosinophil % 0.1 %      Basophil % 0.4 %      Neutrophils, Absolute 5.80 10*3/mm3      Lymphocytes, Absolute 0.60 10*3/mm3      Monocytes, Absolute 0.20 10*3/mm3      Eosinophils, Absolute 0.00 10*3/mm3      Basophils, Absolute 0.00 10*3/mm3      nRBC 0.0 /100 WBC         No results found for: HGBA1C  Results from last 7 days   Lab Units 03/15/20  1721   INR  1.11*       Results from last 7 days   Lab Units 03/15/20  1734   PH, ARTERIAL pH units 7.330*   PO2 ART mm Hg 190.4*   PCO2, ARTERIAL mm Hg 72.3*   HCO3 ART mmol/L 38.1*         Microbiology Results (last 10 days)     Procedure Component Value - Date/Time    Respiratory Panel, PCR - Swab, Nasopharynx [[id number]]  (Normal) Collected:  03/15/20 1838    Lab Status:  Final result Specimen:  Swab from Nasopharynx Updated:  03/15/20 2103     ADENOVIRUS, PCR Not Detected     Coronavirus 229E Not Detected     Coronavirus HKU1 Not Detected     Coronavirus NL63 Not Detected     Coronavirus OC43 Not Detected     Human Metapneumovirus Not Detected     Human Rhinovirus/Enterovirus Not Detected     Influenza B PCR Not Detected     Parainfluenza Virus 1 Not Detected     Parainfluenza Virus 2 Not Detected     Parainfluenza Virus 3 Not Detected     Parainfluenza Virus 4 Not Detected     Bordetella pertussis pcr Not Detected     Influenza A H1 2009 PCR Not Detected     Chlamydophila pneumoniae PCR Not Detected     Mycoplasma pneumo by PCR Not Detected      Influenza A PCR Not Detected     Influenza A H3 Not Detected     Influenza A H1 Not Detected     RSV, PCR Not Detected    Narrative:       The coronavirus on the RVP is NOT COVID-19 and is NOT indicative of infection with COVID-19.           ECG/EMG Results (most recent)     Procedure Component Value Units Date/Time    ECG 12 Lead [[id number]] Collected:  03/15/20 1654     Updated:  03/16/20 0744    Narrative:       HEART RATE= 62  bpm  RR Interval= 964  ms  NC Interval= 202  ms  P Horizontal Axis= -18  deg  P Front Axis= 30  deg  QRSD Interval= 118  ms  QT Interval= 435  ms  QRS Axis= -40  deg  T Wave Axis= 70  deg  - ABNORMAL ECG -  Sinus rhythm  Probable left atrial enlargement  Left ventricular hypertrophy  Anterior Q waves, possibly due to LVH  When compared with ECG of 25-Nov-2019 19:25:28,  No significant change  Electronically Signed By: Conor CoyneIAIN) 16-Mar-2020 07:44:01  Date and Time of Study: 2020-03-15 16:54:48    Adult Transthoracic Echo Complete W/ Cont if Necessary Per Protocol [[id number]] Collected:  03/16/20 0928     Updated:  03/16/20 1231     BSA 1.8 m^2      RVIDd 2.6 cm      IVSd 1.4 cm      LVIDd 4.7 cm      LVPWd 1.3 cm      IVS/LVPW 1.1     EDV(Teich) 104.8 ml      EDV(cubed) 107.0 ml      LV mass(C)d 258.8 grams      LV mass(C)dI 145.2 grams/m^2      Ao root diam 3.2 cm      Ao root area 8.0 cm^2      ACS 1.6 cm      LVOT diam 2.1 cm      LVOT area 3.6 cm^2      EDV(MOD-sp4) 156.8 ml      ESV(MOD-sp4) 88.0 ml      EF(MOD-sp4) 43.9 %      EDV(MOD-sp2) 139.8 ml      ESV(MOD-sp2) 73.0 ml      EF(MOD-sp2) 47.8 %      SV(MOD-sp4) 68.8 ml      SI(MOD-sp4) 38.6 ml/m^2      SV(MOD-sp2) 66.7 ml      SI(MOD-sp2) 37.4 ml/m^2      Ao root area (BSA corrected) 1.8     LV [place] Vol (BSA corrected) 87.9 ml/m^2      LV Sys Vol (BSA corrected) 49.3 ml/m^2      MV E max davis 48.7 cm/sec      MV A max davis 102.0 cm/sec      MV E/A 0.48     MV V2 max 110.9 cm/sec      MV max PG 4.9 mmHg      MV V2 mean  66.4 cm/sec      MV mean PG 2.0 mmHg      MV V2 VTI 34.6 cm      MVA(VTI) 2.3 cm^2      MV dec slope 464.7 cm/sec^2      MV dec time 0.2 sec      Ao pk nima 183.8 cm/sec      Ao max PG 13.5 mmHg      Ao max PG (full) 9.4 mmHg      Ao V2 mean 114.1 cm/sec      Ao mean PG 6.0 mmHg      Ao mean PG (full) 3.8 mmHg      Ao V2 VTI 36.9 cm      VERONIKA(I,A) 2.2 cm^2      VERONIKA(I,D) 2.2 cm^2      VERONIKA(V,A) 2.0 cm^2      VERONIKA(V,D) 2.0 cm^2      AI max nima 282.4 cm/sec      AI max PG 31.9 mmHg      AI dec slope 129.1 cm/sec^2      AI dec time 2.2 sec      AI P1/2t 640.6 msec      LV V1 max PG 4.1 mmHg      LV V1 mean PG 2.2 mmHg      LV V1 max 100.9 cm/sec      LV V1 mean 70.2 cm/sec      LV V1 VTI 22.4 cm      SV(Ao) 295.3 ml      SI(Ao) 165.6 ml/m^2      SV(LVOT) 79.7 ml      SI(LVOT) 44.7 ml/m^2      PA V2 max 92.0 cm/sec      PA max PG 3.4 mmHg      PA max PG (full) 1.6 mmHg      PA acc time 0.13 sec      PI end-d nima 146.2 cm/sec      PI max nima 185.9 cm/sec      PI max PG 13.8 mmHg      RV V1 max PG 1.8 mmHg      RV V1 mean PG 1.2 mmHg      RV V1 max 66.7 cm/sec      RV V1 mean 53.6 cm/sec      RV V1 VTI 13.9 cm      TR max nima 235.8 cm/sec      RVSP(TR) 30.2 mmHg      RAP systole 8.0 mmHg      PA pr(Accel) 20.9 mmHg      Pulm Sys Nima 61.4 cm/sec      Pulm [place] Nima 67.3 cm/sec      Pulm S/D 0.91      CV ECHO XIOMARA - BZI_BMI 22.0 kilograms/m^2       CV ECHO XIOMARA - BSA(HAYCOCK) 1.8 m^2       CV ECHO XIOMARA - BZI_METRIC_WEIGHT 65.8 kg      BH CV ECHO XIOMARA - BZI_METRIC_HEIGHT 172.7 cm      EF(MOD-bp) 44.0 %      LA dimension(2D) 4.3 cm                     Xr Chest 1 View    Result Date: 3/15/2020   1. Cardiomegaly. 2. Prominent interstitial markings. 3. Right basilar scarring.  Electronically Signed By-Himanshu Pierce On:3/15/2020 5:51 PM This report was finalized on [id number] by  Himanshu Pierce, .      Xrays, labs reviewed personally by physician.    Medication Review:   I have reviewed the patient's current medication  list      Scheduled Meds    amiodarone 100 mg Oral Daily   budesonide 0.5 mg Nebulization BID - RT   lamoTRIgine 100 mg Oral Q PM   lamoTRIgine 200 mg Oral QAM   losartan 50 mg Oral Daily   methylPREDNISolone sodium succinate 40 mg Intravenous Q12H   metoprolol succinate  mg Oral Daily   Morphine 15 mg Oral BID   nitroglycerin 1 inch Topical Q6H   PARoxetine 40 mg Oral Daily   QUEtiapine 50 mg Oral Nightly   roflumilast 500 mcg Oral Daily   sodium chloride 10 mL Intravenous Q12H       Meds Infusions       Meds PRN  •  acetaminophen **OR** acetaminophen **OR** acetaminophen  •  ALPRAZolam  •  aluminum-magnesium hydroxide-simethicone  •  bisacodyl  •  HYDROcodone-acetaminophen  •  ipratropium-albuterol  •  magnesium hydroxide  •  melatonin  •  ondansetron **OR** ondansetron  •  oxyCODONE  •  sodium chloride  •  sodium chloride      Assessment/Plan   Assessment/Plan     Active Hospital Problems    Diagnosis  POA   • **Chronic obstructive pulmonary disease with acute exacerbation (CMS/HCC) [J44.1]  Yes   • COPD exacerbation (CMS/HCC) [J44.1]  Yes   • Chronic respiratory failure with hypercapnia (CMS/HCC) [J96.12]  Yes   • Atrial fibrillation (CMS/HCC) [I48.91]  Yes   • Nicotine dependence [F17.200]  Yes   • Insomnia [G47.00]  Yes   • Heart failure (CMS/HCC) [I50.9]  Yes   • Chronic back pain [M54.9, G89.29]  Yes   • Hypertension [I10]  Yes   • Bipolar affective disorder (CMS/HCC) [F31.9]  Yes   • Generalized anxiety disorder [F41.1]  Yes      Resolved Hospital Problems   No resolved problems to display.         Active Hospital Problems:  COPD exacerbation improving we will continue IV steroids and bronchodilator treatments likely needs another 24 to 48 hours  -Continue Pulmicort, DuoNebs PRN, Daliresp  -Continue Solu-Medrol  -Monitor oxygen keep sats 90 to 95%     Chronic respiratory failure  -Patient on home O2  -O2 2 L per nasal cannula  -Keep sats 90 to 95%     Congestive heart failure  -BNP 6123  -CXR:  Cardiomegaly  -Received Lasix 40 mg IV x1  -EF 55% (5/11/2017)  -Obtain echo     Chest pain  -Initial troponin negative  -Check serial troponins  -Continue Nitropaste     Atrial fibrillation  -Continue Toprol XL, amiodarone     Hypertension  -Continue losartan, Toprol XL     Bipolar disorder  -Continue Lamictal, Paxil, Seroquel     Anxiety  -Continue Paxil     Chronic back pain  -Continue patient's MS Contin, oxycodone (INSPECT reviewed)     Tobacco dependence  -Smokes 3 cigarettes a day  -Encourage cessation     VTE Prophylaxis -       Mechanical Order History:               Ordered          03/15/20 1928   Place Sequential Compression Device  Once           03/15/20 1928   Maintain Sequential Compression Device                    Resolved Hospital Problems:  No notes have been filed under this hospital service.  Service: Hospitalist                Code Status -   Code Status and Medical Interventions:   Ordered at: 03/15/20 1929     Level Of Support Discussed With:    Patient     Code Status:    CPR     Medical Interventions (Level of Support Prior to Arrest):    Full       Discharge Planning    Destination      Coordination has not been started for this encounter.      Durable Medical Equipment      Coordination has not been started for this encounter.      Dialysis/Infusion      Coordination has not been started for this encounter.      Home Medical Care      Coordination has not been started for this encounter.      Therapy      Coordination has not been started for this encounter.      Community Resources      Coordination has not been started for this encounter.            Electronically signed by Monster Limon MD, 03/16/20, 13:31.  [facility] Hospitalist Team

## 2020-03-17 NOTE — PROGRESS NOTES
Continued Stay Note  MADDISON Garcia     Patient Name: Adolfo Alejandro  MRN: [id number]  Today's Date: 3/17/2020    Admit Date: 3/15/2020    Discharge Plan     Row Name 03/17/20 1309       Plan    Plan  D/C Plan : Pending PT/OT eval.         Discharge Codes    No documentation.             Naheed Costa RN

## 2020-03-17 NOTE — PLAN OF CARE
Pt has had bouts of getting confused but reorients after a short discussion. Can be grouchy when anxiety is up or he is in pain. Impulsive. Falls risk precautions in place. 3/17/2020 1790

## 2020-03-17 NOTE — SIGNIFICANT NOTE
Call for HR 45, .  Home dose of metoprolol is 50 mg XL daily.  100mg daily is ordered.  Change home dose to 50 mg daily and add hydralazine IV for SBP >160.

## 2020-03-17 NOTE — PLAN OF CARE
Problem: Patient Care Overview  Goal: Plan of Care Review  Outcome: Ongoing (interventions implemented as appropriate)  Flowsheets (Taken 3/17/2020 4955)  Outcome Summary: 73 y/o M admitted for AE COPD. Patient is on home O2 at baseline and uses [facility]. He is independent with ADLs. He lives with his spouse who can help as needed. He denies recent falls. He demos ability to ambulate 200' with RW on 4L supp O2 with SBA. Pt has rollator if needed. Patient reports he feel shis mobility and endurance are at baseline. No additional PT needs while IP or following d/c. Anticipate pt will continue to improve with return to activity and home environment. Pt safe to d/c home from PT standpoint.

## 2020-03-17 NOTE — PROGRESS NOTES
[facility] Medicine Services Daily Progress Note      Hospitalist Team  LOS 1 days      Patient Care Team:  Adama Vilchis MD as PCP - General    Patient Location: 2111/1      Subjective   Subjective     Chief Complaint / Subjective  Chief Complaint   Patient presents with   • Shortness of Breath       Present on Admission:  • Chronic obstructive pulmonary disease with acute exacerbation (CMS/HCC)  • COPD exacerbation (CMS/HCC)  • Atrial fibrillation (CMS/HCC)  • Bipolar affective disorder (CMS/HCC)  • Chronic back pain  • Generalized anxiety disorder  • Hypertension  • Insomnia  • Heart failure (CMS/HCC)  • Nicotine dependence  • Chronic respiratory failure with hypercapnia (CMS/HCC)      Brief Synopsis of Hospital Course/HPI    Mr. Alejandro is a 74 y.o.  male with a history of COPD, CHF, atrial fibrillation, chronic back pain, chronic respiratory failure, requiring home oxygen, , bipolar disorder and anxiety presented to the [facility] ED on 3/15/2020 with a complaint of shortness of breath,chest pain, and productive cough x7 days.  Patient states he got worse today and he had increase his oxygen to 3 L from his normal 2 L per nasal cannula.  Patient denies fever, ill contacts, palpitations, and fatigue.       In the ED, CXR: Cardiomegaly, no acute active disease.  Initial troponin negative, BNP 6123,  BUN 16, creatinine 0.94,Potassium 4.7, WBC 6.5, Hgb 12.0, HCT 34.8, respiratory panel negative, EKG: Normal sinus rhythm.  pH 7.33, PCO2 72.3.  Patient given DuoNeb, Solu-Medrol 125 mg IV, Lasix 40 mg IV, 1 inch Nitropaste to chest wall, Norco 5-325 mg p.o. x1, Toradol 15 mg IV x1.  Patient will be admitted for further evaluation and management          Date:: March 16  Patient states he is feeling better with his breathing        ROS  A complete 12 system review was performed and is negative except as described in the history of presenting illness    Objective    "  Objective      Vital Signs  Temp:  [97.4 °F (36.3 °C)-98.8 °F (37.1 °C)] 98.3 °F (36.8 °C)  Heart Rate:  [44-56] 55  Resp:  [18-20] 20  BP: (124-194)/(53-87) 146/68  Oxygen Therapy  SpO2: 98 %  Pulse Oximetry Type: Continuous  Device (Oxygen Therapy): nasal cannula  Flow (L/min): 3  Flowsheet Rows      First Filed Value   Admission Height  172.7 cm (68\") Documented at 03/15/2020 1649   Admission Weight  65.8 kg (145 lb) Documented at 03/15/2020 1649        Intake & Output (last 3 days)       03/14 0701 - 03/15 0700 03/15 0701 - 03/16 0700 03/16 0701 - 03/17 0700 03/17 0701 - 03/18 0700    P.O.  360 720     Total Intake(mL/kg)  360 (5.5) 720 (10.9)     Urine (mL/kg/hr)  700 2200 (1.4)     Stool   1     Total Output  700 2201     Net  -340 -1481                 Lines, Drains & Airways    Active LDAs     None                  Physical Exam:  Patient is awake alert oriented x3  Pupils are equal round reactive to light extraocular movements intact throat is clear no JVD no lymphadenopathy  Neck is supple  Lungs are clear distant breath sounds mild sparse wheeze  Heart is regular rate rhythm normal S1-S2 no S3 no murmurs no rubs  Abdomen is soft nontender positive bowel sounds no rebound no guarding  Extremities shows no cyanosis clubbing edema  Neuro cranial nerves II through XII are intact there is no focal deficits   skin shows no petechiae or purpura        Procedures:              Results Review:     I reviewed the patient's new clinical results.      Lab Results (last 24 hours)     ** No results found for the last 24 hours. **        No results found for: HGBA1C  Results from last 7 days   Lab Units 03/15/20  1721   INR  1.11*       Results from last 7 days   Lab Units 03/15/20  1734   PH, ARTERIAL pH units 7.330*   PO2 ART mm Hg 190.4*   PCO2, ARTERIAL mm Hg 72.3*   HCO3 ART mmol/L 38.1*         Microbiology Results (last 10 days)     Procedure Component Value - Date/Time    Respiratory Panel, PCR - Swab, " Nasopharynx [[id number]]  (Normal) Collected:  03/15/20 1838    Lab Status:  Final result Specimen:  Swab from Nasopharynx Updated:  03/15/20 2103     ADENOVIRUS, PCR Not Detected     Coronavirus 229E Not Detected     Coronavirus HKU1 Not Detected     Coronavirus NL63 Not Detected     Coronavirus OC43 Not Detected     Human Metapneumovirus Not Detected     Human Rhinovirus/Enterovirus Not Detected     Influenza B PCR Not Detected     Parainfluenza Virus 1 Not Detected     Parainfluenza Virus 2 Not Detected     Parainfluenza Virus 3 Not Detected     Parainfluenza Virus 4 Not Detected     Bordetella pertussis pcr Not Detected     Influenza A H1 2009 PCR Not Detected     Chlamydophila pneumoniae PCR Not Detected     Mycoplasma pneumo by PCR Not Detected     Influenza A PCR Not Detected     Influenza A H3 Not Detected     Influenza A H1 Not Detected     RSV, PCR Not Detected    Narrative:       The coronavirus on the RVP is NOT COVID-19 and is NOT indicative of infection with COVID-19.           ECG/EMG Results (most recent)     Procedure Component Value Units Date/Time    ECG 12 Lead [[id number]] Collected:  03/15/20 1654     Updated:  03/16/20 0744    Narrative:       HEART RATE= 62  bpm  RR Interval= 964  ms  NJ Interval= 202  ms  P Horizontal Axis= -18  deg  P Front Axis= 30  deg  QRSD Interval= 118  ms  QT Interval= 435  ms  QRS Axis= -40  deg  T Wave Axis= 70  deg  - ABNORMAL ECG -  Sinus rhythm  Probable left atrial enlargement  Left ventricular hypertrophy  Anterior Q waves, possibly due to LVH  When compared with ECG of 25-Nov-2019 19:25:28,  No significant change  Electronically Signed By: [facility] 16-Mar-2020 07:44:01  Date and Time of Study: 2020-03-15 16:54:48    Adult Transthoracic Echo Complete W/ Cont if Necessary Per Protocol [[id number]] Collected:  03/16/20 0928     Updated:  03/16/20 2153     BSA 1.8 m^2      RVIDd 2.6 cm      IVSd 1.4 cm      LVIDd 4.7 cm      LVPWd 1.3 cm      IVS/LVPW 1.1      EDV(Teich) 104.8 ml      EDV(cubed) 107.0 ml      LV mass(C)d 258.8 grams      LV mass(C)dI 145.2 grams/m^2      Ao root diam 3.2 cm      Ao root area 8.0 cm^2      ACS 1.6 cm      LVOT diam 2.1 cm      LVOT area 3.6 cm^2      EDV(MOD-sp4) 156.8 ml      ESV(MOD-sp4) 88.0 ml      EF(MOD-sp4) 43.9 %      EDV(MOD-sp2) 139.8 ml      ESV(MOD-sp2) 73.0 ml      EF(MOD-sp2) 47.8 %      SV(MOD-sp4) 68.8 ml      SI(MOD-sp4) 38.6 ml/m^2      SV(MOD-sp2) 66.7 ml      SI(MOD-sp2) 37.4 ml/m^2      Ao root area (BSA corrected) 1.8     LV [place] Vol (BSA corrected) 87.9 ml/m^2      LV Sys Vol (BSA corrected) 49.3 ml/m^2      MV E max davis 48.7 cm/sec      MV A max davis 102.0 cm/sec      MV E/A 0.48     MV V2 max 110.9 cm/sec      MV max PG 4.9 mmHg      MV V2 mean 66.4 cm/sec      MV mean PG 2.0 mmHg      MV V2 VTI 34.6 cm      MVA(VTI) 2.3 cm^2      MV dec slope 464.7 cm/sec^2      MV dec time 0.2 sec      Ao pk davis 183.8 cm/sec      Ao max PG 13.5 mmHg      Ao max PG (full) 9.4 mmHg      Ao V2 mean 114.1 cm/sec      Ao mean PG 6.0 mmHg      Ao mean PG (full) 3.8 mmHg      Ao V2 VTI 36.9 cm      VERONIKA(I,A) 2.2 cm^2      VERONIKA(I,D) 2.2 cm^2      VERONIKA(V,A) 2.0 cm^2      VERONIKA(V,D) 2.0 cm^2      AI max davis 282.4 cm/sec      AI max PG 31.9 mmHg      AI dec slope 129.1 cm/sec^2      AI dec time 2.2 sec      AI P1/2t 640.6 msec      LV V1 max PG 4.1 mmHg      LV V1 mean PG 2.2 mmHg      LV V1 max 100.9 cm/sec      LV V1 mean 70.2 cm/sec      LV V1 VTI 22.4 cm      SV(Ao) 295.3 ml      SI(Ao) 165.6 ml/m^2      SV(LVOT) 79.7 ml      SI(LVOT) 44.7 ml/m^2      PA V2 max 92.0 cm/sec      PA max PG 3.4 mmHg      PA max PG (full) 1.6 mmHg      PA acc time 0.13 sec      PI end-d davis 146.2 cm/sec      PI max davis 185.9 cm/sec      PI max PG 13.8 mmHg      RV V1 max PG 1.8 mmHg      RV V1 mean PG 1.2 mmHg      RV V1 max 66.7 cm/sec      RV V1 mean 53.6 cm/sec      RV V1 VTI 13.9 cm      TR max davis 235.8 cm/sec      RVSP(TR) 30.2 mmHg      RAP systole 8.0  mmHg      PA pr(Accel) 20.9 mmHg      Pulm Sys Nima 61.4 cm/sec      Pulm Bur[place] Nima 67.3 cm/sec      Pulm S/D 0.91      CV ECHO XIOMARA - BZI_BMI 22.0 kilograms/m^2       CV ECHO XIOMARA - BSA(HAYCOCK) 1.8 m^2       CV ECHO XIOMARA - BZI_METRIC_WEIGHT 65.8 kg       CV ECHO XIOMARA - BZI_METRIC_HEIGHT 172.7 cm      EF(MOD-bp) 44.0 %      LA dimension(2D) 4.3 cm     Narrative:       · The following left ventricular wall segments are hypokinetic: mid   anterior, apical anterior, basal anterolateral, mid anterolateral, apical   lateral, basal inferolateral, mid inferolateral, apical inferior, mid   inferior, apical septal, basal inferoseptal, mid inferoseptal, apex   hypokinetic, mid anteroseptal, basal anterior, basal inferior and basal   inferoseptal.  · Left ventricular systolic function is moderately decreased.  · Right ventricular cavity is borderline dilated.  · Left atrial cavity size is borderline dilated.  · Mild to moderate aortic valve regurgitation is present.                  Results for orders placed during the hospital encounter of 03/15/20   Adult Transthoracic Echo Complete W/ Cont if Necessary Per Protocol    Narrative · The following left ventricular wall segments are hypokinetic: mid   anterior, apical anterior, basal anterolateral, mid anterolateral, apical   lateral, basal inferolateral, mid inferolateral, apical inferior, mid   inferior, apical septal, basal inferoseptal, mid inferoseptal, apex   hypokinetic, mid anteroseptal, basal anterior, basal inferior and basal   inferoseptal.  · Left ventricular systolic function is moderately decreased.  · Right ventricular cavity is borderline dilated.  · Left atrial cavity size is borderline dilated.  · Mild to moderate aortic valve regurgitation is present.          Xr Chest 1 View    Result Date: 3/15/2020   1. Cardiomegaly. 2. Prominent interstitial markings. 3. Right basilar scarring.  Electronically Signed By-Himanshu Pierce On:3/15/2020 5:51 PM This report  was finalized on [id number] by  Himanshu Pierce .      Xrays, labs reviewed personally by physician.    Medication Review:   I have reviewed the patient's current medication list      Scheduled Meds    amiodarone 100 mg Oral Daily   budesonide 0.5 mg Nebulization BID - RT   lamoTRIgine 100 mg Oral Q PM   lamoTRIgine 200 mg Oral QAM   losartan 50 mg Oral Daily   methylPREDNISolone sodium succinate 40 mg Intravenous Q12H   metoprolol succinate XL 50 mg Oral Daily   Morphine 15 mg Oral BID   nitroglycerin 1 inch Topical Q6H   PARoxetine 40 mg Oral Daily   QUEtiapine 50 mg Oral Nightly   roflumilast 500 mcg Oral Daily   sodium chloride 10 mL Intravenous Q12H       Meds Infusions       Meds PRN  •  acetaminophen **OR** acetaminophen **OR** acetaminophen  •  ALPRAZolam  •  aluminum-magnesium hydroxide-simethicone  •  bisacodyl  •  hydrALAZINE  •  HYDROcodone-acetaminophen  •  ipratropium-albuterol  •  magnesium hydroxide  •  melatonin  •  ondansetron **OR** ondansetron  •  oxyCODONE  •  sodium chloride  •  sodium chloride      Assessment/Plan   Assessment/Plan     Active Hospital Problems    Diagnosis  POA   • **Chronic obstructive pulmonary disease with acute exacerbation (CMS/HCC) [J44.1]  Yes   • COPD exacerbation (CMS/HCC) [J44.1]  Yes   • Chronic respiratory failure with hypercapnia (CMS/HCC) [J96.12]  Yes   • Atrial fibrillation (CMS/HCC) [I48.91]  Yes   • Nicotine dependence [F17.200]  Yes   • Insomnia [G47.00]  Yes   • Heart failure (CMS/HCC) [I50.9]  Yes   • Chronic back pain [M54.9, G89.29]  Yes   • Hypertension [I10]  Yes   • Bipolar affective disorder (CMS/HCC) [F31.9]  Yes   • Generalized anxiety disorder [F41.1]  Yes      Resolved Hospital Problems   No resolved problems to display.         Active Hospital Problems:  3/17  Patient is feeling significantly improved breathing is stable oxygenation is stable  He does have some sinus bradycardia beta-blocker dose has been adjusted we will continue steroids and  bronchodilators his EF is 44% Lasix has been added to his regimen orally we will continue to monitor electrolytes likely discharge home tomorrow  3/16  COPD exacerbation improving we will continue IV steroids and bronchodilator treatments likely needs another 24 to 48 hours  -Continue Pulmicort, DuoNebs PRN, Daliresp  -Continue Solu-Medrol  -Monitor oxygen keep sats 90 to 95%     Chronic respiratory failure  -Patient on home O2  -O2 2 L per nasal cannula  -Keep sats 90 to 95%     Congestive heart failure  -BNP 6123  -CXR: Cardiomegaly  -Received Lasix 40 mg IV x1  -EF 55% (5/11/2017)  -Obtain echo     Chest pain  -Initial troponin negative  -Check serial troponins  -Continue Nitropaste     Atrial fibrillation  -Continue Toprol XL, amiodarone     Hypertension  -Continue losartan, Toprol XL     Bipolar disorder  -Continue Lamictal, Paxil, Seroquel     Anxiety  -Continue Paxil     Chronic back pain  -Continue patient's MS Contin, oxycodone (INSPECT reviewed)     Tobacco dependence  -Smokes 3 cigarettes a day  -Encourage cessation     VTE Prophylaxis -       Mechanical Order History:               Ordered          03/15/20 1928   Place Sequential Compression Device  Once           03/15/20 1928   Maintain Sequential Compression Device                    Resolved Hospital Problems:  No notes have been filed under this hospital service.  Service: Hospitalist                Code Status -   Code Status and Medical Interventions:   Ordered at: 03/15/20 1929     Level Of Support Discussed With:    Patient     Code Status:    CPR     Medical Interventions (Level of Support Prior to Arrest):    Full       Discharge Planning    Destination      Coordination has not been started for this encounter.      Durable Medical Equipment      Coordination has not been started for this encounter.      Dialysis/Infusion      Coordination has not been started for this encounter.      Home Medical Care      Coordination has not been started  for this encounter.      Therapy      Coordination has not been started for this encounter.      Community Resources      Coordination has not been started for this encounter.            Electronically signed by Monster Limon MD, 03/17/20, 14:20.  [facility] Hospitalist Team

## 2020-03-17 NOTE — THERAPY EVALUATION
Patient Name: Adolfo Alejandro  : 1945    MRN: [id number]                              Today's Date: 3/17/2020       Admit Date: 3/15/2020    Visit Dx:     ICD-10-CM ICD-9-CM   1. Chronic obstructive pulmonary disease, unspecified COPD type (CMS/[facility]) J44.9 496   2. Systolic congestive heart failure, unspecified HF chronicity (CMS/[facility]) I50.20 428.20     428.0     Patient Active Problem List   Diagnosis   • Abdominal aortic aneurysm (AAA) (CMS/[facility])   • Alcoholism (CMS/[facility])   • Arthralgia of right acromioclavicular joint   • Atherosclerotic heart disease of native coronary artery without angina pectoris   • Atrial fibrillation (CMS/[facility])   • Bilateral impacted cerumen   • Bipolar affective disorder (CMS/[facility])   • Cellulitis of right buttock   • Chronic back pain   • Pulmonary emphysema (CMS/[facility])   • Chronic obstructive pulmonary disease with acute exacerbation (CMS/[facility])   • Gastroesophageal reflux disease   • Generalized anxiety disorder   • Heart failure (CMS/[facility])   • Hip pain, right   • Hospital-acquired pneumonia   • Hx of traumatic brain injury   • Hyperlipidemia   • Hypertension   • Insomnia   • Kidney stone   • Nicotine dependence   • Osteoarthritis of hip   • Synovial cyst   • Other shoulder lesions, left shoulder   • Other specified dorsopathies, site unspecified   • Pneumonia   • Pain in shoulder   • Polyosteoarthritis   • Sprain of ligaments of lumbar spine   • Status post coronary artery bypass graft   • Status post hip replacement   • Subarachnoid hemorrhage (CMS/[facility])   • Subdural hematoma (CMS/[facility])   • COPD exacerbation (CMS/[facility])   • Chronic respiratory failure with hypercapnia (CMS/[facility])     Past Medical History:   Diagnosis Date   • Anxiety    • Arthritis    • Bipolar affective disorder (CMS/[facility])    • CHF (congestive heart failure) (CMS/[facility])    • COPD (chronic obstructive pulmonary disease) (CMS/[facility])    • Coronary artery disease    • GERD (gastroesophageal reflux disease)    • Hypertension    •  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   • Memory loss, short term    • Sepsis (CMS/[facility])      Past Surgical History:   Procedure Laterality Date   • ABDOMINAL AORTIC ANEURYSM REPAIR     • CHOLECYSTECTOMY     • CORONARY ANGIOPLASTY     • CORONARY ARTERY BYPASS GRAFT  03/13/2014   • THORACOTOMY Right    • TOTAL HIP ARTHROPLASTY Left    • TOTAL HIP ARTHROPLASTY Right 02/03/2014     General Information     Row Name 03/17/20 1459          PT Evaluation Time/Intention    Document Type  evaluation  -     Mode of Treatment  physical therapy  -     Row Name 03/17/20 1459          General Information    Patient Profile Reviewed?  yes patient uses STC for mobility and uses 2L portable O2. Patient rpeorts he is not always wearing it during waking hours but he always has it near him and uses PRN. Patient lives with spouse who is able to assist as needed however pt is independent w ADLs  -     Existing Precautions/Restrictions  fall  -     Row Name 03/17/20 1459          Relationship/Environment    Lives With  spouse  -     Row Name 03/17/20 1459          Resource/Environmental Concerns    Current Living Arrangements  home/apartment/condo one story  -     Row Name 03/17/20 1459          Home Main Entrance    Number of Stairs, Main Entrance  none  -     Row Name 03/17/20 1459          Stairs Within Home, Primary    Number of Stairs, Within Home, Primary  none  -     Row Name 03/17/20 1456          Cognitive Assessment/Intervention- PT/OT    Cognitive Assessment/Intervention Comment  patient reports it is 2019 and does not know date. Able to determine month  -       User Key  (r) = Recorded By, (t) = Taken By, (c) = Cosigned By    Initials Name Provider Type     Connie Garcia, PT Physical Therapist        Mobility     Row Name 03/17/20 1505          Sit-Stand Transfer    Sit-Stand [place] (Transfers)  supervision  -     Assistive Device (Sit-Stand Transfers)   walker, front-wheeled  -     Row Name 03/17/20 1501          Gait/Stairs Assessment/Training    [place] Level (Gait)  supervision  -     Assistive Device (Gait)  walker, front-wheeled  -     Distance in Feet (Gait)  200'  -     Comment (Gait/Stairs)  SaO2 WNL following-- on 4L O2 (3.5L at rest) No LOB and no increased fatigue or symptoms of lightheadedness  -       User Key  (r) = Recorded By, (t) = Taken By, (c) = Cosigned By    Initials Name Provider Type     Connie Garcia, PT Physical Therapist        Obj/Interventions     Row Name 03/17/20 1503          General ROM    GENERAL ROM COMMENTS  grossly WFL  -[facility] Name 03/17/20 1503          MMT (Manual Muscle Testing)    General MMT Comments  B LE >3/5 per functional assessment  -[facility] Name 03/17/20 1503          Static Sitting Balance    Level of [place] (Unsupported Sitting, Static Balance)  independent  -[facility] Name 03/17/20 1503          Static Standing Balance    Level of [place] (Supported Standing, Static Balance)  supervision  -     Assistive Device Utilized (Supported Standing, Static Balance)  walker, rolling  -     Row Name 03/17/20 1503          Sensory Assessment/Intervention    Sensory General Assessment  no sensation deficits identified  -       User Key  (r) = Recorded By, (t) = Taken By, (c) = Cosigned By    Initials Name Provider Type    Connie Mcgovern, PT Physical Therapist        Goals/Plan    No documentation.       Clinical Impression     [facility] Name 03/17/20 1503          Pain Assessment    Additional Documentation  Pain Scale: FACES Pre/Post-Treatment (Group)  -[facility] Name 03/17/20 1503          Pain Scale: FACES Pre/Post-Treatment    Pain: FACES Scale, Pretreatment  4-->hurts little more  -     Pain: FACES Scale, Post-Treatment  4-->hurts little more  -     Pre/Post Treatment Pain Comment  reports back pain with activity  -     Row Name 03/17/20 1503          Plan of Care  Review    Plan of Care Reviewed With  patient  -SS     Outcome Summary  73 y/o M admitted for AE COPD. Patient is on home O2 at baseline and uses STC. He is independent with ADLs. He lives with his spouse who can help as needed. He denies recent falls. He demos ability to ambulate 200' with RW on 4L supp O2 with SBA. Pt has rollator if needed. Patient reports he feel shis mobility and endurance are at baseline. No additional PT needs while IP or following d/c. Anticipate pt will continue to improve with return to activity and home environment. Pt safe to d/c home from PT standpoint.   -     Row Name 03/17/20 1500          Physical Therapy Clinical Impression    Criteria for Skilled Interventions Met (PT Clinical Impression)  no;current level of function same as previous level of function  -     Row Name 03/17/20 1509          Positioning and Restraints    Pre-Treatment Position  sitting in chair/recliner  -SS     Post Treatment Position  chair  -SS       User Key  (r) = Recorded By, (t) = Taken By, (c) = Cosigned By    Initials Name Provider Type    SS Connie Garcia, PT Physical Therapist        Outcome Measures    No documentation.         PT Recommendation and Plan     Outcome Summary/Treatment Plan (PT)  Anticipated Discharge Disposition (PT): home with assist  Plan of Care Reviewed With: patient  Outcome Summary: 73 y/o M admitted for AE COPD. Patient is on home O2 at baseline and uses STC. He is independent with ADLs. He lives with his spouse who can help as needed. He denies recent falls. He demos ability to ambulate 200' with RW on 4L supp O2 with SBA. Pt has rollator if needed. Patient reports he feel shis mobility and endurance are at baseline. No additional PT needs while IP or following d/c. Anticipate pt will continue to improve with return to activity and home environment. Pt safe to d/c home from PT standpoint.      Time Calculation:   PT Charges     Row Name 03/17/20 9458             Time  Calculation    Start Time  1522  -      Stop Time  1551  -      Time Calculation (min)  29 min  -      PT Received On  03/17/20  -         Time Calculation- PT    Total Timed Code Minutes- PT  0 minute(s)  -        User Key  (r) = Recorded By, (t) = Taken By, (c) = Cosigned By    Initials Name Provider Type     Connie Garcia, PT Physical Therapist        Therapy Charges for Today     Code Description Service Date Service Provider Modifiers Qty    [id number]  PT EVAL MOD COMPLEXITY 4 3/17/2020 Connie Garcia, PT GP 1               Connie Garcia, PT  3/17/2020

## 2020-03-17 NOTE — PLAN OF CARE
Pts HR has chronically been robson ranging from 40-60.  Pt had elevated /81, new meds ordered.  Pt is impulsive, gets out of bed without using call light.  Continue to redirect

## 2020-03-18 NOTE — THERAPY EVALUATION
Acute Care - Occupational Therapy Initial Evaluation   Jose     Patient Name: Adolfo Alejandro  : 1945  MRN: [id number]  Today's Date: 3/18/2020             Admit Date: 3/15/2020       ICD-10-CM ICD-9-CM   1. Chronic obstructive pulmonary disease, unspecified COPD type (CMS/[facility]) J44.9 496   2. Systolic congestive heart failure, unspecified HF chronicity (CMS/[facility]) I50.20 428.20     428.0     Patient Active Problem List   Diagnosis   • Abdominal aortic aneurysm (AAA) (CMS/[facility])   • Alcoholism (CMS/[facility])   • Arthralgia of right acromioclavicular joint   • Atherosclerotic heart disease of native coronary artery without angina pectoris   • Atrial fibrillation (CMS/[facility])   • Bilateral impacted cerumen   • Bipolar affective disorder (CMS/[facility])   • Cellulitis of right buttock   • Chronic back pain   • Pulmonary emphysema (CMS/[facility])   • Chronic obstructive pulmonary disease with acute exacerbation (CMS/[facility])   • Gastroesophageal reflux disease   • Generalized anxiety disorder   • Heart failure (CMS/[facility])   • Hip pain, right   • Hospital-acquired pneumonia   • Hx of traumatic brain injury   • Hyperlipidemia   • Hypertension   • Insomnia   • Kidney stone   • Nicotine dependence   • Osteoarthritis of hip   • Synovial cyst   • Other shoulder lesions, left shoulder   • Other specified dorsopathies, site unspecified   • Pneumonia   • Pain in shoulder   • Polyosteoarthritis   • Sprain of ligaments of lumbar spine   • Status post coronary artery bypass graft   • Status post hip replacement   • Subarachnoid hemorrhage (CMS/[facility])   • Subdural hematoma (CMS/[facility])   • COPD exacerbation (CMS/[facility])   • Chronic respiratory failure with hypercapnia (CMS/[facility])     Past Medical History:   Diagnosis Date   • Anxiety    • Arthritis    • Bipolar affective disorder (CMS/[facility])    • CHF (congestive heart failure) (CMS/[facility])    • COPD (chronic obstructive pulmonary disease) (CMS/[facility])    • Coronary artery disease    • GERD (gastroesophageal reflux  disease)    • Hypertension    • 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   • Memory loss, short term    • Sepsis (CMS/HCC)      Past Surgical History:   Procedure Laterality Date   • ABDOMINAL AORTIC ANEURYSM REPAIR     • CHOLECYSTECTOMY     • CORONARY ANGIOPLASTY     • CORONARY ARTERY BYPASS GRAFT  03/13/2014   • THORACOTOMY Right    • TOTAL HIP ARTHROPLASTY Left    • TOTAL HIP ARTHROPLASTY Right 02/03/2014          OT ASSESSMENT FLOWSHEET (last 12 hours)      Occupational Therapy Evaluation     Row Name 03/18/20 1130                   OT Evaluation Time/Intention    Subjective Information  no complaints  -ES        Document Type  evaluation  -ES        Mode of Treatment  occupational therapy  -ES        Patient Effort  excellent  -ES        Comment  Pt eager to d/c home.   -ES           General Information    Patient Profile Reviewed?  yes  -ES        Patient Observations  alert;cooperative  -ES        Patient/Family Observations  No family present. NSG states pt has good support of spouse, but she has cold symptoms and is avoiding others due to COVID risks.  -ES        General Observations of Patient  Pt sitting EOB. HM. o2.   -ES        Prior Level of Function  independent:  -ES        Equipment Currently Used at Home  cane, straight  -ES        Pertinent History of Current Functional Problem  Pt is 75 yo male admitted with COPD, chronic respiratory failure. PMHx significant for Afib, CHF, HTN, Bipolar, Anxiety, chronic back pain, and TBI per NSG. No family present, but pt states he resides with spouse and adult son. 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   -ES        Existing Precautions/Restrictions  fall  -ES           Relationship/Environment    Primary Source of Support/Comfort  spouse;child(julio)  -ES        Lives With  spouse  -ES        Primary Roles/Responsibilities  retired , then started [employer]  school  -ES           Resource/Environmental Concerns    Current Living Arrangements  home/apartment/condo  -ES        Resource/Environmental Concerns  none  -ES           Home Main Entrance    Number of Stairs, Main Entrance  none  -ES           Stairs Within Home, Primary    Number of Stairs, Within Home, Primary  none  -ES           Cognitive Assessment/Intervention- PT/OT    Cognitive Assessment/Intervention Comment  Short Blessed Test: 10/28, indicating cognitive impairment.   -ES           Safety Issues, Functional Mobility    Safety Issues Affecting Function (Mobility)  impulsivity;insight into deficits/self awareness;judgment  -ES           Bed Mobility Assessment/Treatment    Bed Mobility Assessment/Treatment  bed mobility (all) activities  -ES        [place] Level (Bed Mobility)  conditional independence  -ES           Functional Mobility    Functional Mobility- Ind. Level  conditional independence  -ES        Functional Mobility- Device  rolling walker  -ES           Transfer Assessment/Treatment    Transfer Assessment/Treatment  bed-chair transfer;chair-bed transfer;sit-stand transfer;stand-sit transfer  -ES           Bed-Chair Transfer    Bed-Chair [place] (Transfers)  conditional independence  -ES           Chair-Bed Transfer    Chair-Bed [place] (Transfers)  conditional independence  -ES           Sit-Stand Transfer    Sit-Stand [place] (Transfers)  conditional independence  -ES           Stand-Sit Transfer    Stand-Sit [place] (Transfers)  conditional independence  -ES           ADL Assessment/Intervention    BADL Assessment/Intervention  upper body dressing;lower body dressing;clothing fastener management  -ES           Upper Body Dressing Assessment/Training    Upper Body Dressing [place] Level  set up  -ES           Lower Body Dressing Assessment/Training    Lower Body Dressing [place] Level  set up  -ES           Clothes Fastener Management    [place] Level  (Clothes Fastener Management)  minimum assist (75% patient effort)  -ES           BADL Safety/Performance    Impairments, BADL Safety/Performance  balance;cognition;coordination;motor planning;pain;strength;trunk/postural control  -ES           General ROM    GENERAL ROM COMMENTS  WFL  -ES           MMT (Manual Muscle Testing)    General MMT Comments  4/5  -ES           Motor Assessment/Interventions    Additional Documentation  Balance (Group)  -ES           Balance    Balance  static sitting balance;dynamic sitting balance;static standing balance;dynamic standing balance  -ES           Static Sitting Balance    Level of [place] (Unsupported Sitting, Static Balance)  independent  -ES           Dynamic Sitting Balance    Level of [place], Reaches Outside Midline (Sitting, Dynamic Balance)  independent  -ES           Static Standing Balance    Level of [place] (Supported Standing, Static Balance)  supervision  -ES           Dynamic Standing Balance    Level of [place], Reaches Outside Midline (Standing, Dynamic Balance)  supervision  -ES           Sensory Assessment/Intervention    Sensory General Assessment  no sensation deficits identified  -ES           Positioning and Restraints    Pre-Treatment Position  in bed  -ES        Post Treatment Position  other  -ES        Other Position  -- edge of bed  -ES           Pain Scale: FACES Pre/Post-Treatment    Pain: FACES Scale, Pretreatment  2-->hurts little bit  -ES        Pain: FACES Scale, Post-Treatment  2-->hurts little bit  -ES        Pre/Post Treatment Pain Comment  back  -ES           Health Promotion    Additional Documentation  Coping (Group)  -ES           Coping    Observed Emotional State  anxious  -ES        Verbalized Emotional State  anxiety  -ES           Plan of Care Review    Plan of Care Reviewed With  patient  -ES        Outcome Summary  Pt is 73 yo male admitted with COPD, chronic respiratory failure. PMHx significant for Afib,  CHF, HTN, Bipolar, Anxiety, chronic back pain, and TBI per NSG. No family present, but pt states he resides with spouse and adult son. 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 Pt demos cognitive impairment and impaired safety awareness, often impulsive and requiring assist for mutltiple medical lines. He requires 2L O2 at home, and is currently on 4L. However, during brief ambulation (40'x2) pt saturations >94% on 2L. Pt appears near baseline, despite baseline being questionable, and safe to return home with family asistance. Will follow 3x/weekly.  -ES           Clinical Impression (OT)    Rehab Potential (OT Eval)  good, to achieve stated therapy goals  -ES        Therapy Frequency (OT Eval)  3 times/wk  -ES        Predicted Duration of Therapy Intervention (Therapy Eval)  until discharge  -ES        Anticipated Discharge Disposition (OT)  home with home health  -ES           Vital Signs    Pre SpO2 (%)  97  -ES        O2 Delivery Pre Treatment  supplemental O2 4L  -ES        Intra SpO2 (%)  94  -ES        O2 Delivery Intra Treatment  supplemental O2 2L  -ES        Post SpO2 (%)  95  -ES        O2 Delivery Post Treatment  supplemental O2 2L  -ES           OT Goals    Transfer Goal Selection (OT)  transfer, OT goal 1  -ES        Dressing Goal Selection (OT)  dressing, OT goal 1  -ES        Activity Tolerance Goal Selection (OT)  activity tolerance, OT goal 1  -ES        Additional Documentation  Activity Tolerance Goal Selection (OT) (Row)  -ES           Transfer Goal 1 (OT)    Activity/Assistive Device (Transfer Goal 1, OT)  tub  -ES        [place] Level/Cues Needed (Transfer Goal 1, OT)  supervision required  -ES        Time Frame (Transfer Goal 1, OT)  2 weeks  -ES           Dressing Goal 1 (OT)    Activity/Assistive Device (Dressing Goal 1, OT)  lower body dressing  -ES        [place]/Cues Needed (Dressing Goal 1, OT)  conditional independence   -ES        Time Frame (Dressing Goal 1, OT)  2 weeks  -ES            Activity Tolerance Goal 1 (OT)    Activity Tolerance Goal 1 (OT)  10 minute standing tolerance task with appropriate SpO2 to facilitate increased safety and activity tolerance in preparation for ADLs.  -ES        Activity Level (Endurance Goal 1, OT)  10 min activity;O2 sat >/ equal to 88%  -ES        Time Frame (Activity Tolerance Goal 1, OT)  2 weeks  -ES           Living Environment    Home Accessibility  tub/shower is not walk in  -ES          User Key  (r) = Recorded By, (t) = Taken By, (c) = Cosigned By    Initials Name Effective Dates    ES Leyda Borges, OT 03/01/19 -                OT Recommendation and Plan  Outcome Summary/Treatment Plan (OT)  Anticipated Discharge Disposition (OT): home with home health  Therapy Frequency (OT Eval): 3 times/wk  Plan of Care Review  Plan of Care Reviewed With: patient  Plan of Care Reviewed With: patient  Outcome Summary: Pt is 75 yo male admitted with COPD, chronic respiratory failure. PMHx significant for Afib, CHF, HTN, Bipolar, Anxiety, chronic back pain, and TBI per NSG. No family present, but pt states he resides with spouse and adult son. 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 Pt demos cognitive impairment and impaired safety awareness, often impulsive and requiring assist for mutltiple medical lines. He requires 2L O2 at home, and is currently on 4L. However, during brief ambulation (40'x2) pt saturations >94% on 2L. Pt appears near baseline, despite baseline being questionable, and safe to return home with family asistance. Will follow 3x/weekly.        Time Calculation:   Time Calculation- OT     Row Name 03/18/20 1354             Time Calculation- OT    OT Start Time  1130  -ES      OT Stop Time  1151  -ES      OT Time Calculation (min)  21 min  -ES      Total Timed Code Minutes- OT  10 minute(s)  -ES      OT Non-Billable Time  (min)  11 min  -ES      OT Received On  03/18/20  -ES      OT - Next Appointment  03/20/20  -ES      OT Goal Re-Cert Due Date  04/01/20  -ES        User Key  (r) = Recorded By, (t) = Taken By, (c) = Cosigned By    Initials Name Provider Type    Leyda Goodman OT Occupational Therapist        Therapy Charges for Today     Code Description Service Date Service Provider Modifiers Qty    [id number]  OT SELF CARE/MGMT/TRAIN EA 15 MIN 3/18/2020 Leyda Borges OT GO 1    [id number]  OT EVAL MOD COMPLEXITY 2 3/18/2020 Leyda Borges OT GO 1               Leyda Borges OT  3/18/2020

## 2020-03-18 NOTE — PROGRESS NOTES
Continued Stay Note  MADDISON Garcia     Patient Name: Adolfo Alejandro  MRN: [id number]  Today's Date: 3/18/2020    Admit Date: 3/15/2020    Discharge Plan     Row Name 03/18/20 1125       Plan    Plan  D/C Plan: Home with family. Home O2 with [facility].     Plan Comments  Spoke with patient at bedside. Patient states he feels at this baseline and safe to D/C home. Patient has current home O2 with [facility] [facility] [employer]. Barrier to D/C: monitoring kidney function, 4L O2.         Expected Discharge Date and Time     Expected Discharge Date Expected Discharge Time    Mar 18, 2020             Olamide Baig

## 2020-03-18 NOTE — DISCHARGE SUMMARY
[facility] Medicine Services  DISCHARGE SUMMARY        Prepared For PCP:  Adama Vilchis MD    Patient Name: Adolfo Alejandro  : 1945  MRN: [id number]      Date of Admission:   3/15/2020    Date of Discharge:  3/18/2020    Length of stay:  LOS: 0 days     Hospital Course     Presenting Problem:   Chronic obstructive pulmonary disease, unspecified COPD type (CMS/HCC) [J44.9]  Systolic congestive heart failure, unspecified HF chronicity (CMS/HCC) [I50.20]  Chronic obstructive pulmonary disease, unspecified COPD type (CMS/HCC) [J44.9]  Chronic obstructive pulmonary disease, unspecified COPD type (CMS/HCC) [J44.9]      Active Hospital Problems    Diagnosis  POA   • **Chronic obstructive pulmonary disease with acute exacerbation (CMS/HCC) [J44.1]  Yes   • COPD exacerbation (CMS/[facility]) [J44.1]  Yes   • Chronic respiratory failure with hypercapnia (CMS/[facility]) [J96.12]  Yes   • Atrial fibrillation (CMS/[facility]) [I48.91]  Yes   • Nicotine dependence [F17.200]  Yes   • Insomnia [G47.00]  Yes   • Heart failure (CMS/[facility]) [I50.9]  Yes   • Chronic back pain [M54.9, G89.29]  Yes   • Hypertension [I10]  Yes   • Bipolar affective disorder (CMS/[facility]) [F31.9]  Yes   • Generalized anxiety disorder [F41.1]  Yes      Resolved Hospital Problems   No resolved problems to display.           Hospital Course:  Mr. Alejandro is a 74 y.o.  male with a history of COPD, CHF, atrial fibrillation, chronic back pain, chronic respiratory failure, requiring home oxygen, , bipolar disorder and anxiety presented to the [facility] ED on 3/15/2020 with a complaint of shortness of breath,chest pain, and productive cough x7 days.  Patient states he got worse today and he had increase his oxygen to 3 L from his normal 2 L per nasal cannula.  Patient denies fever, ill contacts, palpitations, and fatigue.       In the ED, CXR: Cardiomegaly, no acute active disease.  Initial troponin negative, BNP 6123,  BUN 16,  creatinine 0.94,Potassium 4.7, WBC 6.5, Hgb 12.0, HCT 34.8, respiratory panel negative, EKG: Normal sinus rhythm.  pH 7.33, PCO2 72.3.  Patient given DuoNeb, Solu-Medrol 125 mg IV, Lasix 40 mg IV, 1 inch Nitropaste to chest wall, Norco 5-325 mg p.o. x1, Toradol 15 mg IV x1 chest x-ray showing interstitial prominence no focal infiltrates    Patient was treated for COPD exacerbation with steroids and bronchodilators improved significantly and echocardiogram was done which showed areas EF of 40%  Lasix was added to his regimen on a regular basis and was back to his baseline level of function in couple of days without any wheezing and was discharged home in stable condition and instructed to follow-up with his PCP also advised him to establish with a cardiologist as he does have history of CABG however has had stable coronary artery disease subsequently discharge diagnosis is acute COPD exacerbation    Recommendation for Outpatient Providers:             Reasons For Change In Medications and Indications for New Medications:        Day of Discharge     HPI:       Vital Signs:   Temp:  [97.4 °F (36.3 °C)-97.8 °F (36.6 °C)] 97.5 °F (36.4 °C)  Heart Rate:  [44-56] 53  Resp:  [17-20] 20  BP: (100-178)/(49-86) 132/57     Physical Exam:  Physical Exam    Pertinent  and/or Most Recent Results     Results from last 7 days   Lab Units 03/18/20  0348 03/16/20  0037 03/15/20  1721   WBC 10*3/mm3  --  4.70 6.50   HEMOGLOBIN g/dL  --  10.4* 12.0*   HEMATOCRIT %  --  30.4* 34.8*   PLATELETS 10*3/mm3  --  258 305   SODIUM mmol/L 136 141 141   POTASSIUM mmol/L 4.5 3.9 4.7   CHLORIDE mmol/L 91* 95* 95*   CO2 mmol/L 33.0* 34.0* 34.0*   BUN mg/dL 35* 19 16   CREATININE mg/dL 1.37* 0.86 0.94   GLUCOSE mg/dL 155* 137* 121*   CALCIUM mg/dL 9.5 8.9 9.3     Results from last 7 days   Lab Units 03/15/20  1721   BILIRUBIN mg/dL 0.3   ALK PHOS U/L 142*   ALT (SGPT) U/L 16   AST (SGOT) U/L 24   PROTIME Seconds 11.5   INR  1.11*           Invalid  input(s): TG, LDLCALC, LDLREALC  Results from last 7 days   Lab Units 03/16/20  0706 03/16/20  0037 03/15/20  2105 03/15/20  1721   PROBNP pg/mL  --   --   --  6,123.0*   TROPONIN T ng/mL <0.010 <0.010 <0.010 <0.010       Brief Urine Lab Results     None          Microbiology Results Abnormal     Procedure Component Value - Date/Time    Respiratory Panel, PCR - Swab, Nasopharynx [[id number]]  (Normal) Collected:  03/15/20 1838    Lab Status:  Final result Specimen:  Swab from Nasopharynx Updated:  03/15/20 2103     ADENOVIRUS, PCR Not Detected     Coronavirus 229E Not Detected     Coronavirus HKU1 Not Detected     Coronavirus NL63 Not Detected     Coronavirus OC43 Not Detected     Human Metapneumovirus Not Detected     Human Rhinovirus/Enterovirus Not Detected     Influenza B PCR Not Detected     Parainfluenza Virus 1 Not Detected     Parainfluenza Virus 2 Not Detected     Parainfluenza Virus 3 Not Detected     Parainfluenza Virus 4 Not Detected     Bordetella pertussis pcr Not Detected     Influenza A H1 2009 PCR Not Detected     Chlamydophila pneumoniae PCR Not Detected     Mycoplasma pneumo by PCR Not Detected     Influenza A PCR Not Detected     Influenza A H3 Not Detected     Influenza A H1 Not Detected     RSV, PCR Not Detected    Narrative:       The coronavirus on the RVP is NOT COVID-19 and is NOT indicative of infection with COVID-19.           Xr Chest 1 View    Result Date: 3/15/2020  Impression:  1. Cardiomegaly. 2. Prominent interstitial markings. 3. Right basilar scarring.  Electronically Signed By-Himanshu Pierce On:3/15/2020 5:51 PM This report was finalized on [id number] by  Himanshu Pierce, .                Results for orders placed during the hospital encounter of 03/15/20   Adult Transthoracic Echo Complete W/ Cont if Necessary Per Protocol    Narrative · The following left ventricular wall segments are hypokinetic: mid   anterior, apical anterior, basal anterolateral, mid anterolateral, apical      lateral, basal inferolateral, mid inferolateral, apical inferior, mid   inferior, apical septal, basal inferoseptal, mid inferoseptal, apex   hypokinetic, mid anteroseptal, basal anterior, basal inferior and basal   inferoseptal.  · Left ventricular systolic function is moderately decreased.  · Right ventricular cavity is borderline dilated.  · Left atrial cavity size is borderline dilated.  · Mild to moderate aortic valve regurgitation is present.                  Test Results Pending at Discharge        Procedures Performed           Consults:   Consults     No orders found from 2/15/2020 to 3/16/2020.            Discharge Details        Discharge Medications      New Medications      Instructions Start Date   amoxicillin-clavulanate 875-125 MG per tablet  Commonly known as:  Augmentin   1 tablet, Oral, 2 Times Daily      furosemide 20 MG tablet  Commonly known as:  Lasix   20 mg, Oral, Daily      potassium chloride 10 MEQ CR tablet  Commonly known as:  K-DUR   10 mEq, Oral, Daily         Changes to Medications      Instructions Start Date   ipratropium-albuterol 0.5-2.5 mg/3 ml nebulizer  Commonly known as:  DUO-NEB  What changed:    · when to take this  · reasons to take this   3 mL, Nebulization, 4 Times Daily - RT      metoprolol succinate XL 25 MG 24 hr tablet  Commonly known as:  Toprol XL  What changed:    · medication strength  · how much to take   25 mg, Oral, Daily      oxyCODONE-acetaminophen  MG per tablet  Commonly known as:  PERCOCET  What changed:  how much to take   1 tablet, Oral, 5 Times Daily         Continue These Medications      Instructions Start Date   amiodarone 100 MG tablet  Commonly known as:  PACERONE   50 mg, Oral, Daily      budesonide 0.5 MG/2ML nebulizer solution  Commonly known as:  Pulmicort   Use bid VIA nebulizor   Dx j44.9      lamoTRIgine 100 MG tablet  Commonly known as:  LaMICtal   100 mg, Oral, Every Evening      lamoTRIgine 100 MG tablet  Commonly known as:   LaMICtal   200 mg, Oral, Every Morning      losartan 50 MG tablet  Commonly known as:  COZAAR   50 mg, Oral, Daily      melatonin 5 MG tablet tablet   20 mg, Oral, Nightly      Morphine 15 MG 12 hr tablet  Commonly known as:  MS Contin   15 mg, Oral, 2 Times Daily      PARoxetine 40 MG tablet  Commonly known as:  PAXIL   40 mg, Oral, Daily      predniSONE 20 MG tablet  Commonly known as:  DELTASONE   3 qd x 2 days 2 po 4 days 1 po qd 4 days 1/2 qd 2 days      QUEtiapine 50 MG tablet  Commonly known as:  SEROquel   50 mg, Oral, Nightly      roflumilast 500 MCG tablet tablet  Commonly known as:  Daliresp   500 mcg, Oral, Daily             No Known Allergies      Discharge Disposition:  Home or Self Care    Diet:  Hospital:  Diet Order   Procedures   • Diet Regular         Discharge Activity:   Activity Instructions     Up as tolerated, safely.                 CODE STATUS:    Code Status and Medical Interventions:   Ordered at: 03/15/20 1929     Level Of Support Discussed With:    Patient     Code Status:    CPR     Medical Interventions (Level of Support Prior to Arrest):    Full         Follow-up Appointments  No future appointments.          Condition on Discharge:      Stable          Electronically signed by Monster Limon MD, 03/18/20, 4:04 PM.    Time: I spent  30  minutes on this discharge activity which included face-to-face encounter with the patient/reviewing the data in the system/coordination of the care with the nursing staff as well as consultants/documentation/entering orders.

## 2020-03-18 NOTE — PLAN OF CARE
Problem: Patient Care Overview  Goal: Plan of Care Review  3/18/2020 1353 by Leyda Borges OT  Outcome: Ongoing (interventions implemented as appropriate)  Flowsheets (Taken 3/18/2020 1130)  Outcome Summary: Pt is 75 yo male admitted with COPD, chronic respiratory failure. PMHx significant for Afib, CHF, HTN, Bipolar, Anxiety, chronic back pain, and TBI per NSG. No family present, but pt states he resides with spouse and adult son. 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 Pt demos cognitive impairment and impaired safety awareness, often impulsive and requiring assist for mutltiple medical lines. He requires 2L O2 at home, and is currently on 4L. However, during brief ambulation (40'x2) pt saturations >94% on 2L. Pt appears near baseline, despite baseline being questionable, and safe to return home with family asistance. Will follow 3x/weekly.  3/18/2020 1353 by Leyda Borges OT  Outcome: Ongoing (interventions implemented as appropriate)  3/18/2020 1353 by Leyda Borges OT  Outcome: Ongoing (interventions implemented as appropriate)

## 2020-03-18 NOTE — PLAN OF CARE
Pt is alert and oriented, although forgetful.  Pt is impulsive at times.  PRN dose of hydralazine given once for BP in the 170's.  BP did come down to the 150's. No distress noted. Will continue to monitor.

## 2020-03-18 NOTE — TELEPHONE ENCOUNTER
Patient is discharging from the hospital today for COPD and needs to see PMJ in a week.  Please call patient to schedule the follow up.

## 2020-03-18 NOTE — TELEPHONE ENCOUNTER
I have tried to call patient twice and there is voicemail set up. I put him on the schedule for 8:30 on 3/23. I will try to call 3/19 and do TCM

## 2020-03-19 NOTE — PROGRESS NOTES
Case Management Discharge Note      Final Note: Home         Final Discharge Disposition Code: 01 - home or self-care

## 2020-03-20 NOTE — OUTREACH NOTE
COPD/PN Week 1 Survey      Responses   [facility]   Does the patient have one of the following disease processes/diagnoses(primary or secondary)?  COPD/Pneumonia   Is there a successful TCM telephone encounter documented?  Yes          Juli Garcia LPN

## 2020-03-20 NOTE — OUTREACH NOTE
Prep Survey      Responses   [facility]   Is LACE score < 7 ?  No   Eligibility  Readm Mgmt   Discharge diagnosis  COPD exacerbation   Does the patient have one of the following disease processes/diagnoses(primary or secondary)?  COPD/Pneumonia   Does the patient have Home health ordered?  No   Is there a DME ordered?  No   Prep survey completed?  Yes          Janie Nettles RN

## 2020-03-23 NOTE — PROGRESS NOTES
Rooming Tab(CC,VS,Pt Hx,Fall Screen)  Chief Complaint   Patient presents with   • Follow-up     hospital aftercare       Subjective    Adolfo is a 74-year-old white male with chronic obstructive pulmonary disease who was admitted last week on Monday for an acute exacerbation of his chronic pulmonary disease.  He was in the hospital till Wednesday and received IV fluids IV antibiotics oxygen and rescue therapy treatments.  He improved and was discharged to home.  He is here today in follow-up for a transitional care visit.  His only changes include cutting back on his opiate intake to 1 every 4 hours and increasing his metoprolol 200 mg a day.  He takes metoprolol succinate.  The oxycodone is the 10/325 and it is now ordered 1 every 5 times a day.  Adolfo has chronic pain associated with his back and arthritis in his extremities.  He also has an element of fibromyalgia.  He looks like he is feeling reasonably well.  He is taking his medicines as ordered and no new problems have developed since his hospital stay.  We reviewed his hospital course and stay.  We reviewed his medications.    I have reviewed and updated his medications, medical history and problem list during today's office visit.     Patient Care Team:  Adama Vilchis MD as PCP - General    Problem List Tab  Medications Tab  Synopsis Tab  Chart Review Tab  Care Everywhere Tab  Immunizations Tab  Patient History Tab    Social History     Tobacco Use   • Smoking status: Former Smoker     Types: Cigarettes   • Smokeless tobacco: Never Used   Substance Use Topics   • Alcohol use: Not Currently     Frequency: Never     Comment: former ETOH abuse Hx       Review of Systems   Constitutional: Positive for fatigue. Negative for activity change, appetite change, fever and unexpected weight loss.   HENT: Negative for congestion, ear pain, hearing loss, postnasal drip, rhinorrhea, sinus pressure, sneezing, sore throat and swollen glands.    Eyes: Negative for  blurred vision.   Respiratory: Positive for cough, shortness of breath and wheezing.    Cardiovascular: Negative for chest pain.   Gastrointestinal: Negative for abdominal pain, nausea and indigestion.   Genitourinary: Negative for dysuria, urgency and urinary incontinence.   Musculoskeletal: Negative for arthralgias, back pain, joint swelling and myalgias.   Skin: Negative for dry skin and skin lesions.   Allergic/Immunologic: Negative for environmental allergies.   Neurological: Negative for dizziness, headache, memory problem and confusion.   Hematological: Negative for adenopathy.   Psychiatric/Behavioral: Negative for agitation, depressed mood and stress. The patient is not nervous/anxious.    All other systems reviewed and are negative.      Objective     Rooming Tab(CC,VS,Pt Hx,Fall Screen)  /92 (BP Location: Left arm, Patient Position: Sitting, Cuff Size: Adult)   Pulse 56   Temp 99 °F (37.2 °C) (Oral)   Resp 8   Wt 63.5 kg (140 lb)   SpO2 94%   BMI 21.29 kg/m²     Body mass index is 21.29 kg/m².    Physical Exam   Constitutional: He is oriented to person, place, and time. He appears well-developed and well-nourished.   HENT:   Head: Normocephalic and atraumatic.   Right Ear: External ear normal.   Left Ear: External ear normal.   Nose: Nose normal.   Mouth/Throat: Oropharynx is clear and moist. No oropharyngeal exudate.   Eyes: Pupils are equal, round, and reactive to light. Conjunctivae and EOM are normal. Right eye exhibits no discharge. Left eye exhibits no discharge. No scleral icterus.   Neck: Normal range of motion. Neck supple. No JVD present. No thyromegaly present.   Cardiovascular: Normal rate, regular rhythm, normal heart sounds and intact distal pulses.   No murmur heard.  Pulmonary/Chest: Effort normal and breath sounds normal. No respiratory distress. He has no wheezes. He has no rales. He exhibits no tenderness.   Patient has bilateral rhonchi and a few end inspiratory rales.  He  has prolonged expiratory phase.   Abdominal: Soft. Bowel sounds are normal. He exhibits no distension. There is no tenderness.   Genitourinary: Rectal exam shows guaiac negative stool.   Musculoskeletal: Normal range of motion. He exhibits no edema, tenderness or deformity.   Osteoarthritic changes   Lymphadenopathy:     He has no cervical adenopathy.   Neurological: He is alert and oriented to person, place, and time.   No focal findings.   Skin: Skin is warm and dry.   Psychiatric: He has a normal mood and affect. His behavior is normal. Judgment and thought content normal.   Vitals reviewed.       Statin Choice Calculator  Data Reviewed:    Xr Chest 1 View    Result Date: 3/15/2020  Impression:  1. Cardiomegaly. 2. Prominent interstitial markings. 3. Right basilar scarring.  Electronically Signed By-Himanshu Pierce On:3/15/2020 5:51 PM This report was finalized on [id number] by  Himanshu Pierce, .            Lab Results   Component Value Date    BUN 35 (H) 03/18/2020    CREATININE 1.37 (H) 03/18/2020    EGFRIFNONA 51 (L) 03/18/2020     03/18/2020    K 4.5 03/18/2020    CL 91 (L) 03/18/2020    CALCIUM 9.5 03/18/2020    ALBUMIN 4.40 03/15/2020    BILITOT 0.3 03/15/2020    ALKPHOS 142 (H) 03/15/2020    AST 24 03/15/2020    ALT 16 03/15/2020    WBC 4.70 03/16/2020    RBC 3.41 (L) 03/16/2020    HCT 30.4 (L) 03/16/2020    MCV 89.1 03/16/2020    MCH 30.4 03/16/2020    INR 1.11 (H) 03/15/2020      Assessment/Plan   Order Review Tab  Health Maintenance Tab  Patient Plan/Order Tab  Diagnoses and all orders for this visit:    1. Chronic obstructive pulmonary disease with acute exacerbation (CMS/HCC) (Primary)  Assessment & Plan:  COPD is improving with treatment.  COPD information handout given.  Patient to keep COPD diary.  Discussed monitoring symptoms and use of quick-relief medications and contacting us early in the course of exacerbations.  Warning signs of respiratory distress were reviewed with the patient.    Counseled to avoid exposure to cigarette smoke.  Continue current medications.     Patient is currently taking budesonide through the nebulizer twice a day.  He has DuoNeb every 4 hours if needed via his nebulizer.  He has oxygen 24/7 if needed.  Has Daliresp 500 mg once a day.  These will be continued and he is to monitor how he is feeling carefully and call if he feels like he is backsliding.  For his antibiotic he is on Augmentin 875 and he is taking it twice a day.  He will continue this until it is gone.            Wrapup Tab  Return in about 4 months (around 7/23/2020) for Recheck.

## 2020-03-23 NOTE — PATIENT INSTRUCTIONS
COPD and Physical Activity  Chronic obstructive pulmonary disease (COPD) is a long-term (chronic) condition that affects the lungs. COPD is a general term that can be used to describe many different lung problems that cause lung swelling (inflammation) and limit airflow, including chronic bronchitis and emphysema.  The main symptom of COPD is shortness of breath, which makes it harder to do even simple tasks. This can also make it harder to exercise and be active. Talk with your health care provider about treatments to help you breathe better and actions you can take to prevent breathing problems during physical activity.  What are the benefits of exercising with COPD?  Exercising regularly is an important part of a healthy lifestyle. You can still exercise and do physical activities even though you have COPD. Exercise and physical activity improve your shortness of breath by increasing blood flow (circulation). This causes your heart to pump more oxygen through your body. Moderate exercise can improve your:  · Oxygen use.  · Energy level.  · Shortness of breath.  · Strength in your breathing muscles.  · Heart health.  · Sleep.  · Self-esteem and feelings of self-worth.  · Depression, stress, and anxiety levels.  Exercise can benefit everyone with COPD. The severity of your disease may affect how hard you can exercise, especially at first, but everyone can benefit. Talk with your health care provider about how much exercise is safe for you, and which activities and exercises are safe for you.  What actions can I take to prevent breathing problems during physical activity?  · Sign up for a pulmonary rehabilitation program. This type of program may include:  ? Education about lung diseases.  ? Exercise classes that teach you how to exercise and be more active while improving your breathing. This usually involves:  § Exercise using your lower extremities, such as a stationary bicycle.  § About 30 minutes of exercise, 2  to 5 times per week, for 6 to 12 weeks  § Strength training, such as push ups or leg lifts.  ? Nutrition education.  ? Group classes in which you can talk with others who also have COPD and learn ways to manage stress.  · If you use an oxygen tank, you should use it while you exercise. Work with your health care provider to adjust your oxygen for your physical activity. Your resting flow rate is different from your flow rate during physical activity.  · While you are exercising:  ? Take slow breaths.  ? Pace yourself and do not try to go too fast.  ? Purse your lips while breathing out. Pursing your lips is similar to a kissing or whistling position.  ? If doing exercise that uses a quick burst of effort, such as weight lifting:  § Breathe in before starting the exercise.  § Breathe out during the hardest part of the exercise (such as raising the weights).  Where to find support  You can find support for exercising with COPD from:  · Your health care provider.  · A pulmonary rehabilitation program.  · Your local health department or community health programs.  · Support groups, online or in-person. Your health care provider may be able to recommend support groups.  Where to find more information  You can find more information about exercising with COPD from:  · American Lung Association: lung.org.  · COPD Foundation: copdfoundation.org.  Contact a health care provider if:  · Your symptoms get worse.  · You have chest pain.  · You have nausea.  · You have a fever.  · You have trouble talking or catching your breath.  · You want to start a new exercise program or a new activity.  Summary  · COPD is a general term that can be used to describe many different lung problems that cause lung swelling (inflammation) and limit airflow. This includes chronic bronchitis and emphysema.  · Exercise and physical activity improve your shortness of breath by increasing blood flow (circulation). This causes your heart to provide more  oxygen to your body.  · Contact your health care provider before starting any exercise program or new activity. Ask your health care provider what exercises and activities are safe for you.  This information is not intended to replace advice given to you by your health care provider. Make sure you discuss any questions you have with your health care provider.  Document Released: 01/10/2019 Document Revised: 01/10/2019 Document Reviewed: 01/10/2019  [employer] Interactive Patient Education © 2020 [employer]  Chronic Obstructive Pulmonary Disease  Chronic obstructive pulmonary disease (COPD) is a long-term (chronic) lung problem. When you have COPD, it is hard for air to get in and out of your lungs. Usually the condition gets worse over time, and your lungs will never return to normal. There are things you can do to keep yourself as healthy as possible.  · Your doctor may treat your condition with:  ? Medicines.  ? Oxygen.  ? Lung surgery.  · Your doctor may also recommend:  ? Rehabilitation. This includes steps to make your body work better. It may involve a team of specialists.  ? Quitting smoking, if you smoke.  ? Exercise and changes to your diet.  ? Comfort measures (palliative care).  Follow these instructions at home:  Medicines  · Take over-the-counter and prescription medicines only as told by your doctor.  · Talk to your doctor before taking any cough or allergy medicines. You may need to avoid medicines that cause your lungs to be dry.  Lifestyle  · If you smoke, stop. Smoking makes the problem worse. If you need help quitting, ask your doctor.  · Avoid being around things that make your breathing worse. This may include smoke, chemicals, and fumes.  · Stay active, but remember to rest as well.  · Learn and use tips on how to relax.  · Make sure you get enough sleep. Most adults need at least 7 hours of sleep every night.  · Eat healthy foods. Eat smaller meals more often. Rest before meals.  Controlled  breathing  Learn and use tips on how to control your breathing as told by your doctor. Try:  · Breathing in (inhaling) through your nose for 1 second. Then, pucker your lips and breath out (exhale) through your lips for 2 seconds.  · Putting one hand on your belly (abdomen). Breathe in slowly through your nose for 1 second. Your hand on your belly should move out. Pucker your lips and breathe out slowly through your lips. Your hand on your belly should move in as you breathe out.    Controlled coughing  Learn and use controlled coughing to clear mucus from your lungs. Follow these steps:  1. Lean your head a little forward.  2. Breathe in deeply.  3. Try to hold your breath for 3 seconds.  4. Keep your mouth slightly open while coughing 2 times.  5. Spit any mucus out into a tissue.  6. Rest and do the steps again 1 or 2 times as needed.  General instructions  · Make sure you get all the shots (vaccines) that your doctor recommends. Ask your doctor about a flu shot and a pneumonia shot.  · Use oxygen therapy and pulmonary rehabilitation if told by your doctor. If you need home oxygen therapy, ask your doctor if you should buy a tool to measure your oxygen level (oximeter).  · Make a COPD action plan with your doctor. This helps you to know what to do if you feel worse than usual.  · Manage any other conditions you have as told by your doctor.  · Avoid going outside when it is very hot, cold, or humid.  · Avoid people who have a sickness you can catch (contagious).  · Keep all follow-up visits as told by your doctor. This is important.  Contact a doctor if:  · You cough up more mucus than usual.  · There is a change in the color or thickness of the mucus.  · It is harder to breathe than usual.  · Your breathing is faster than usual.  · You have trouble sleeping.  · You need to use your medicines more often than usual.  · You have trouble doing your normal activities such as getting dressed or walking around the  house.  Get help right away if:  · You have shortness of breath while resting.  · You have shortness of breath that stops you from:  ? Being able to talk.  ? Doing normal activities.  · Your chest hurts for longer than 5 minutes.  · Your skin color is more blue than usual.  · Your pulse oximeter shows that you have low oxygen for longer than 5 minutes.  · You have a fever.  · You feel too tired to breathe normally.  Summary  · Chronic obstructive pulmonary disease (COPD) is a long-term lung problem.  · The way your lungs work will never return to normal. Usually the condition gets worse over time. There are things you can do to keep yourself as healthy as possible.  · Take over-the-counter and prescription medicines only as told by your doctor.  · If you smoke, stop. Smoking makes the problem worse.  This information is not intended to replace advice given to you by your health care provider. Make sure you discuss any questions you have with your health care provider.  Document Released: 06/05/2009 Document Revised: 01/22/2018 Document Reviewed: 01/22/2018  [employer] Interactive Patient Education © 2020 [employer]

## 2020-03-23 NOTE — ASSESSMENT & PLAN NOTE
COPD is improving with treatment.  COPD information handout given.  Patient to keep COPD diary.  Discussed monitoring symptoms and use of quick-relief medications and contacting us early in the course of exacerbations.  Warning signs of respiratory distress were reviewed with the patient.   Counseled to avoid exposure to cigarette smoke.  Continue current medications.     Patient is currently taking budesonide through the nebulizer twice a day.  He has DuoNeb every 4 hours if needed via his nebulizer.  He has oxygen 24/7 if needed.  Has Daliresp 500 mg once a day.  These will be continued and he is to monitor how he is feeling carefully and call if he feels like he is backsliding.  For his antibiotic he is on Augmentin 875 and he is taking it twice a day.  He will continue this until it is gone.

## 2020-03-24 NOTE — TELEPHONE ENCOUNTER
PATIENT REQUESTED A REFILL ON:Morphine (MS CONTIN) 15 MG 12 hr tablet    PHARMACY PREFERRED:[facility] #[id number] - [facility] [place] - [address] AT SEC OF [facility] & [address] [facility] - [phone] [facility] [phone] FX     PT CAN BE REACHED AT:[phone]

## 2020-03-30 NOTE — OUTREACH NOTE
COPD/PN Week 2 Survey      Responses   [facility]   Does the patient have one of the following disease processes/diagnoses(primary or secondary)?  COPD/Pneumonia   Was the primary reason for admission:  COPD exacerbation   Week 2 attempt successful?  Yes   Call start time  1733   Call end time  1740   Discharge diagnosis  COPD exacerbation   Person spoke with today (if not patient) and relationship  Wife   Meds reviewed with patient/caregiver?  Yes   Is the patient having any side effects they believe may be caused by any medication additions or changes?  No   Does the patient have all medications ordered at discharge?  Yes   Is the patient taking all medications as directed (includes completed medication regime)?  Yes   Does the patient have a primary care provider?   Yes   Does the patient have an appointment with their PCP or pulmonologist within 7 days of discharge?  Yes   Has the patient kept scheduled appointments due by today?  Yes   Has home health visited the patient within 72 hours of discharge?  N/A   Did the patient receive a copy of their discharge instructions?  Yes   Nursing interventions  Reviewed instructions with patient   What is the patient's perception of their health status since discharge?  Improving   Are the patient's immunizations up to date?   Yes   Is the patient/caregiver able to teach back the hierarchy of who to call/visit for symptoms/problems? PCP, Specialist, Home health nurse, Urgent Care, ED, 911  Yes   Additional teach back comments  States he is doing very well.  Has already seen Dr. Vilchis and they can contacted if they should need anything   Is the patient able to teach back COPD zones?  Yes   Nursing interventions  Education provided on various zones   Patient reports what zone on this call?  Green Zone   Green Zone  Reports doing well, Breathing without shortness of breath, Usual amount of phlegm/mucus without difficulty coughing up,  Sleeping well   Green Zone interventions:  Take daily medications, Use oxygen as prescribed, Avoid indoor/outdoor triggers   Week 2 call completed?  Yes   Wrap up additional comments  No questions or needs at this time          Kimberley Gamboa LPN

## 2020-04-06 NOTE — OUTREACH NOTE
COPD/PN Week 3 Survey      Responses   [facility] [place] patient discharged from?  [facility]   COVID-19 Test Status  Not tested   Does the patient have one of the following disease processes/diagnoses(primary or secondary)?  COPD/Pneumonia   Was the primary reason for admission:  COPD exacerbation   Week 3 attempt successful?  Yes   Call start time  1405   Call end time  1414   General alerts for this patient  Traumatic brain injury, speak with wife   Discharge diagnosis  COPD exacerbation   Is patient permission given to speak with other caregiver?  Yes   List who call center can speak with  Merry, wife   Person spoke with today (if not patient) and relationship  Merry, Wife   Meds reviewed with patient/caregiver?  Yes   Is the patient taking all medications as directed (includes completed medication regime)?  Yes   Has the patient kept scheduled appointments due by today?  Yes   Comments  Had in office visit 3/23   Psychosocial issues?  No   What is the patient's perception of their health status since discharge?  Improving   Is the patient able to teach back COPD zones?  Yes   Nursing interventions  Education provided on various zones   Patient reports what zone on this call?  Green Zone   Green Zone  Reports doing well, Breathing without shortness of breath, Usual activity and exercise level, Usual amount of phlegm/mucus without difficulty coughing up, Sleeping well, Appetite is good   Green Zone interventions:  Use oxygen as prescribed, Take daily medications, Continue regular exercise/diet plan, Do not smoke   Week 3 call completed?  Yes   Wrap up additional comments  Doing well this week, green zone. Pt has history of traumatic brain injury so speak with wife.            Claudia Hussein RN

## 2020-04-08 NOTE — TELEPHONE ENCOUNTER
PTS WIFE CALLING IN REGARDS TO REFILL Morphine (MS Contin) 15 MG 12 hr tablet    PT CAN BE REACHED [phone]

## 2020-04-09 NOTE — TELEPHONE ENCOUNTER
Pt called and requested refill for oxyCODONE-acetaminophen (PERCOCET)  MG per tablet be sent to   [facility] #[id number] - [place], [place] - [address] AT [facility] [address] [facility] [address] [facility] - [phone]  - [phone] FX     Pt call back  [phone]

## 2020-04-13 NOTE — OUTREACH NOTE
COPD/PN Week 4 Survey      Responses   [facility] patient discharged from?  [facility]   COVID-19 Test Status  Not tested   Does the patient have one of the following disease processes/diagnoses(primary or secondary)?  COPD/Pneumonia   Was the primary reason for admission:  COPD exacerbation   Week 4 attempt successful?  Yes   Call end time  1139   General alerts for this patient  Traumatic brain injury, speak with wife   Discharge diagnosis  COPD exacerbation   Is patient permission given to speak with other caregiver?  Yes   List who call center can speak with  Merry, wife   Person spoke with today (if not patient) and relationship  Merry, Wife   Meds reviewed with patient/caregiver?  Yes   Is the patient having any side effects they believe may be caused by any medication additions or changes?  No   Is the patient taking all medications as directed (includes completed medication regime)?  Yes   Has the patient kept scheduled appointments due by today?  Yes   Comments  Had an appt 2 days after he was discharged   Is the patient still receiving Home Health Services?  N/A   Psychosocial issues?  No   What is the patient's perception of their health status since discharge?  Improving   Is the patient/caregiver able to teach back the hierarchy of who to call/visit for symptoms/problems? PCP, Specialist, Home health nurse, Urgent Care, ED, 911  Yes   Additional teach back comments  States he is doing well with no questions or concerns at this time   Is the patient able to teach back COPD zones?  Yes   Nursing interventions  Education provided on various zones   Patient reports what zone on this call?  Green Zone   Green Zone  Reports doing well, Breathing without shortness of breath, Usual activity and exercise level, Usual amount of phlegm/mucus without difficulty coughing up, Sleeping well, Appetite is good   Green Zone interventions:  Use oxygen as prescribed, Take daily medications, Continue regular exercise/diet  plan, Do not smoke, Avoid indoor/outdoor triggers   Week 4 call completed?  Yes   Would the patient like one additional call?  No   Graduated  Yes   Did the patient feel the follow up calls were helpful during their recovery period?  Yes   Was the number of calls appropriate?  Yes          Kimberley Gamboa LPN

## 2020-05-06 NOTE — TELEPHONE ENCOUNTER
Patients wife calling to refill:  - oxyCODONE-acetaminophen (PERCOCET)  MG per tablet    Verified [facility].    Ingrid can be reached at [phone].

## 2020-05-19 NOTE — TELEPHONE ENCOUNTER
Wife was calling to get a refill for her husbands Morphine (MS Contin) 15 MG 12 hr tablet taken twice a day     Wife confirmed [facility] #[id number] - [place], [place] - [address] AT [address] [facility] OF [facility] [facility] [address] - [phone]  - [phone] FX     Wife call back # [phone]     Wife stated he has 3 pills left

## 2020-06-04 NOTE — TELEPHONE ENCOUNTER
PTS WIFE IS CALLING TO SEE IF THIS PRESCRIPTION FOR HER  IS READY TO BE PICKED UP AT THE PHARMACY.  PT STATES THAT HER SON HAS TO PICK THEM UP AND THEY NEED TO LET HIM KNOW WHEN IT IS OK TO HEAD TO THE PHARMACY FOR     PHARMACY CONFIRMED  PT CALL BACK   [phone]

## 2020-06-04 NOTE — TELEPHONE ENCOUNTER
HAYES CALLED IN TO REQUEST A REFILL OF oxyCODONE-acetaminophen (PERCOCET)  MG per tablet SENT TO  [address] . PATIENT CALL BACK [phone].

## 2020-06-04 NOTE — TELEPHONE ENCOUNTER
This is a duplicate, med is already pending. Ingrid is his wife, she calls for this every month.  Signing off

## 2020-06-04 NOTE — TELEPHONE ENCOUNTER
HAYES CALLED ASKING TO REQUEST A PRESCRIPTION OF OXYCODONE 10.325 FOR THE PATIENT THE PERSON THAT CALLED IS NOT ON THE [facility]         MARY CONTACT NUMBER   [phone]          [facility] #[id number] - [facility] [place] - [address] AT [facility] [address] OF [facility] & [address] [facility] - [phone] [facility] [phone] FX

## 2020-06-17 NOTE — TELEPHONE ENCOUNTER
Patient called and requested refill for Morphine (MS Contin) 15 MG 12 hr tablet be sent to    [facility] #[id number] - [place], [place] - [address] OF [facility] & [address] - [phone]  - [phone] FX     Please advise   [phone]

## 2020-07-02 NOTE — TELEPHONE ENCOUNTER
Caller: Ingrid Alejandro    Relationship: Spouse    Best call back number: [phone]    Medication needed:   Requested Prescriptions     Pending Prescriptions Disp Refills   • oxyCODONE-acetaminophen (PERCOCET)  MG per tablet 150 tablet 0     Sig: Take 1 tablet by mouth 5 (Five) Times a Day.       When do you need the refill by: 7/2/2020    What details did the patient provide when requesting the medication: Will be out of meds tomorrow morning.    Does the patient have less than a 3 day supply:  [x] Yes  [] No    What is the patient's preferred pharmacy: [facility] #[id number] [facility], [place] - [address] AT [address] OF [facility] & [address] - [phone] [facility] [phone]

## 2020-07-16 NOTE — TELEPHONE ENCOUNTER
Caller: MALCOLM COPELAND    Relationship: Emergency Contact    Best call back number: [phone]    Medication needed:   Requested Prescriptions     Pending Prescriptions Disp Refills   • Morphine (MS Contin) 15 MG 12 hr tablet 60 tablet 0     Sig: Take 1 tablet by mouth 2 (Two) Times a Day.       When do you need the refill by: ASAP    What details did the patient provide when requesting the medication: PATIENT WILL BE OUT OF THE MEDICATION TOMORROW    Does the patient have less than a 3 day supply:  [x] Yes  [] No    What is the patient's preferred pharmacy: [facility] #[id number] [facility], [place] - [address] AT [address] OF [facility] & [facility] - [phone]  - [phone]

## 2020-08-01 PROBLEM — R06.00 DYSPNEA: Status: ACTIVE | Noted: 2020-01-01

## 2020-08-01 NOTE — PROGRESS NOTES
RN notified that it is against policy for patient to be on Bipap with restraints with out sitter.  RN stated that the door is open and she has been in the room watching patient and there is an order for the patient to have a sitter.

## 2020-08-01 NOTE — H&P
[facility] PULMONARY/CRITICAL CARE/SLEEP MEDICINE HISTORY & PHYSICAL    PATIENT NAME:  Adolfo Alejandro     :  1945     MRN:  [id number]     ROOM:  [facility] 4/1     PRIMARY CARE PHYSICIAN:  Adama Vilchis MD    SUBJECTIVE     CHIEF COMPLAINT:   Decreased responsiveness over the past several days    HISTORY OF PRESENT ILLNESS:  Adolfo Alejandro is a 75 y.o. 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  Patient is with confusion, and no family is currently at bedside to provide further information regarding patient's clinical presentation.  Patient is confused, but is oriented to person and place.  Patient denies any gross complaints with the exception that he cannot breathe while on BiPAP.    In the ED, labs were obtained and as follows: Troponin <0.010, BNP 9541, glucose 108, CO2 33, WBC 6.3, hemoglobin 9.4, hematocrit 29.7.  ABG: pH 7.299, PCO2 75.1, PO2 67.0, HCO3 36.8, O2 saturation 89.6.  After ABG was obtained, patient was placed on BiPAP, in which a repeat ABG was obtained.  ABG: pH 7.290, PCO2 75.5, PaO2 112.7, HCO3 36.3, and O2 sat 97.5.  It was at this time, that patient was transitioned to AVAPS.  UA was obtained and negative.  Patient shows no evidence of infectious process at this time.  COVID-19 was checked and negative.    [facility] [place] [facility] contacted for admission to ICU and further evaluation and treatment.    INSPECT REPORT:  INSPECT report completed with most recent prescriptions of: Morphine sulfate extended release 15 mg tablet, 60 tablets for 30-day supply filled on 2020; oxycodone-acetaminophen  mg, 150 tablets for 30-day supply filled on 2020.      REVIEW OF SYSTEMS:  Review of systems could not be obtained due to   patient  confusion.    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 (CMS/[facility]) 2017   • Memory loss, short term    • Sepsis (CMS/[facility])      Past Surgical History:   Procedure Laterality Date   • ABDOMINAL AORTIC ANEURYSM REPAIR     • CHOLECYSTECTOMY     • CORONARY ANGIOPLASTY     • CORONARY ARTERY BYPASS GRAFT  03/13/2014   • THORACOTOMY Right    • TOTAL HIP ARTHROPLASTY Left    • TOTAL HIP ARTHROPLASTY Right 02/03/2014     Family History   Problem Relation Age of Onset   • Heart disease Other    • Hypertension Other    • Hyperlipidemia Other      Social History     Tobacco Use   • Smoking status: Former Smoker     Types: Cigarettes   • Smokeless tobacco: Never Used   Substance Use Topics   • Alcohol use: Not Currently     Frequency: Never     Comment: former ETOH abuse Hx   • Drug use: Not Currently        HOME MEDICATIONS:   Prior to Admission medications    Medication Sig Start Date End Date Taking? Authorizing Provider   amiodarone (PACERONE) 200 MG tablet Take 100 mg by mouth Daily. 2/12/20  Yes Miguelina Marina MD   budesonide (PULMICORT) 0.5 MG/2ML nebulizer solution Use bid VIA nebulizor   Dx j44.9  Patient taking differently: Take 0.5 mg by nebulization 2 (Two) Times a Day. 3/13/20  Yes Adama Vilchis MD   furosemide (LASIX) 40 MG tablet TAKE 1 TABLET BY MOUTH DAILY 6/24/20  Yes Adama Vilchis MD   ipratropium-albuterol (DUO-NEB) 0.5-2.5 mg/3 ml nebulizer USE 1 VIAL VIA NEBULIZER FOUR TIMES DAILY  Patient taking differently: Take 3 mL by nebulization 4 (Four) Times a Day. 7/2/20  Yes Adama Vilchis MD   lamoTRIgine (LaMICtal) 100 MG tablet Take 200 mg by mouth Every Morning. 11/18/19  Yes Miguelina Marina MD   lamoTRIgine (LaMICtal) 100  "MG tablet Take 100 mg by mouth Every Evening.   Yes Miguelina Marina MD   losartan (COZAAR) 100 MG tablet Take 100 mg by mouth Daily.   Yes Miguelina Marina MD   melatonin 5 MG tablet tablet Take 20 mg by mouth Every Night.   Yes Miguelina Marina MD   metoprolol succinate XL (TOPROL-XL) 100 MG 24 hr tablet TAKE ONE TABLET BY MOUTH DAILY 4/14/20  Yes Adama Vilchis MD   Morphine (MS Contin) 15 MG 12 hr tablet Take 1 tablet by mouth 2 (Two) Times a Day. 7/17/20  Yes Adama Vilchis MD   oxyCODONE-acetaminophen (PERCOCET)  MG per tablet Take 1 tablet by mouth 5 (Five) Times a Day. 7/8/20  Yes Adama Vilchis MD   PARoxetine (PAXIL) 40 MG tablet Take 40 mg by mouth Daily. 10/3/19  Yes Miguelina Marina MD   QUEtiapine (SEROquel) 50 MG tablet Take 50 mg by mouth Every Night. 11/21/19  Yes Miguelina Marina MD   roflumilast (DALIRESP) 500 MCG tablet tablet Take 1 tablet by mouth Daily. 3/13/20  Yes Adama Vilchis MD   amiodarone (PACERONE) 100 MG tablet Take 50 mg by mouth Daily.  8/1/20  Miguelina Marina MD   amoxicillin-clavulanate (Augmentin) 875-125 MG per tablet Take 1 tablet by mouth 2 (Two) Times a Day. 3/18/20 8/1/20  Monster Limon MD   furosemide (Lasix) 20 MG tablet Take 1 tablet by mouth Daily. 3/18/20 8/1/20  Monster Limon MD   metoprolol succinate XL (Toprol XL) 25 MG 24 hr tablet Take 1 tablet by mouth Daily. 3/18/20 8/1/20  Mnoster Limon MD   NARCAN 4 MG/0.1ML nasal spray  12/27/19 8/1/20  Miguelina Marina MD   potassium chloride (K-DUR) 10 MEQ CR tablet Take 1 tablet by mouth Daily. 3/18/20 8/1/20  Monster Limon MD   predniSONE (DELTASONE) 20 MG tablet 3 qd x 2 days 2 po 4 days 1 po qd 4 days 1/2 qd 2 days 3/13/20 8/1/20  Adama Vilchis MD       ALLERGIES:  Patient has no known allergies.    OBJECTIVE     VITAL SIGNS:  BP (!) 189/85   Pulse 58   Temp 97.8 °F (36.6 °C)   Resp 23   Ht 165.1 cm (65\")   Wt 63.8 kg (140 lb 10.5 oz)   SpO2 100%  "  BMI 23.41 kg/m²     Wt Readings from Last 3 Encounters:   08/01/20 63.8 kg (140 lb 10.5 oz)   03/23/20 63.5 kg (140 lb)   03/18/20 67 kg (147 lb 11.3 oz)       PHYSICAL EXAM:   Constitutional:  Chronically ill appearing, generalized weakness, confusion, no acute distress, non-toxic appearance   Eyes:  PERRL, conjunctiva normal, EOMI  HENT:  Atraumatic, external ears normal, nose normal, oropharynx moist, no pharyngeal exudates. Neck- normal range of motion, no tenderness, supple, trachea midline  Respiratory:  Bibasilar diminished breath sounds with coarse breath sounds and basilar crackles, occasional scattered expiratory wheezes, non-labored respirations without accessory muscle use  Cardiovascular:  Decreased rate, normal rhythm, no murmurs, no gallops, no rubs   GI:  Soft, nondistended, normal bowel sounds, nontender, no organomegaly, no mass, no rebound, no guarding   :  No costovertebral angle tenderness   Musculoskeletal:  No edema, no tenderness, no deformities.   Integument:  Well hydrated, no rash   Lymphatic:  No lymphadenopathy noted   Neurologic:  Alert & oriented x 2, slightly confused but easy to re-orient, CN 2-12 normal, normal motor function, normal sensory function, no focal deficits noted   Psychiatric:  Speech appropriate, but confusion noted    RESULTS     LABS:  Lab Results (last 24 hours)     Procedure Component Value Units Date/Time    POC Glucose Once [[id number]]  (Abnormal) Collected:  07/31/20 2246    Specimen:  Blood Updated:  07/31/20 2247     Glucose 116 mg/dL      Comment: Serial Number: [id number]:  [id number]       CBC & Differential [[id number]] Collected:  07/31/20 2255    Specimen:  Blood Updated:  07/31/20 2305    Narrative:       The following orders were created for panel order CBC & Differential.  Procedure                               Abnormality         Status                     ---------                               -----------         ------                      CBC Auto Differential[[id number]]        Abnormal            Final result                 Please view results for these tests on the individual orders.    Basic Metabolic Panel [[id number]]  (Abnormal) Collected:  07/31/20 2255    Specimen:  Blood Updated:  07/31/20 2325     Glucose 108 mg/dL      BUN --     Comment: Testing performed by alternate method        Creatinine 0.85 mg/dL      Sodium 140 mmol/L      Potassium 4.3 mmol/L      Comment: Specimen hemolyzed.  Results may be affected.        Chloride 98 mmol/L      CO2 33.0 mmol/L      Calcium 8.8 mg/dL      eGFR Non African Amer 88 mL/min/1.73      BUN/Creatinine Ratio --     Comment: Testing not performed        Anion Gap 9.0 mmol/L     Narrative:       GFR Normal >60  Chronic Kidney Disease <60  Kidney Failure <15      BNP [[id number]]  (Abnormal) Collected:  07/31/20 2255    Specimen:  Blood Updated:  07/31/20 2323     proBNP 9,541.0 pg/mL     Narrative:       Among patients with dyspnea, NT-proBNP is highly sensitive for the detection of acute congestive heart failure. In addition NT-proBNP of <300 pg/ml effectively rules out acute congestive heart failure with 99% negative predictive value.    Results may be falsely decreased if patient taking Biotin.      Troponin [[id number]]  (Normal) Collected:  07/31/20 2255    Specimen:  Blood Updated:  07/31/20 2325     Troponin T <0.010 ng/mL     Narrative:       Troponin T Reference Range:  <= 0.03 ng/mL-   Negative for AMI  >0.03 ng/mL-     Abnormal for myocardial necrosis.  Clinicians would have to utilize clinical acumen, EKG, Troponin and serial changes to determine if it is an Acute Myocardial Infarction or myocardial injury due to an underlying chronic condition.       Results may be falsely decreased if patient taking Biotin.      CBC Auto Differential [[id number]]  (Abnormal) Collected:  07/31/20 2255    Specimen:  Blood Updated:  07/31/20 2305     WBC 6.30 10*3/mm3      RBC 3.27 10*6/mm3      Hemoglobin 9.4  g/dL      Hematocrit 29.7 %      MCV 90.7 fL      MCH 28.6 pg      MCHC 31.6 g/dL      RDW 15.1 %      RDW-SD 49.0 fl      MPV 6.8 fL      Platelets 245 10*3/mm3      Neutrophil % 79.6 %      Lymphocyte % 9.3 %      Monocyte % 9.7 %      Eosinophil % 0.7 %      Basophil % 0.7 %      Neutrophils, Absolute 5.10 10*3/mm3      Lymphocytes, Absolute 0.60 10*3/mm3      Monocytes, Absolute 0.60 10*3/mm3      Eosinophils, Absolute 0.00 10*3/mm3      Basophils, Absolute 0.00 10*3/mm3      nRBC 0.1 /100 WBC     BUN [[id number]]  (Normal) Collected:  07/31/20 2255    Specimen:  Blood Updated:  07/31/20 2325     BUN 20 mg/dL     Blood Gas, Arterial [[id number]]  (Abnormal) Collected:  07/31/20 2327    Specimen:  Arterial Blood Updated:  07/31/20 2338     Site Right Radial     Andres's Test Positive     pH, Arterial 7.299 pH units      pCO2, Arterial 75.1 mm Hg      pO2, Arterial 67.0 mm Hg      HCO3, Arterial 36.8 mmol/L      Base Excess, Arterial 8.5 mmol/L      Comment: Serial Number: [id number]:  [id number]        O2 Saturation, Arterial 89.6 %      CO2 Content 39.2 mmol/L      Barometric Pressure for Blood Gas --     Comment: N/A        Modality Cannula     FIO2 <21 %      Hemodilution No    Blood Gas, Arterial [[id number]]  (Abnormal) Collected:  08/01/20 0008    Specimen:  Arterial Blood Updated:  08/01/20 0018     Site Right Radial     Andres's Test Positive     pH, Arterial 7.290 pH units      pCO2, Arterial 75.5 mm Hg      pO2, Arterial 112.7 mm Hg      HCO3, Arterial 36.3 mmol/L      Base Excess, Arterial 7.9 mmol/L      Comment: Serial Number: [id number]:  [id number]        O2 Saturation, Arterial 97.5 %      CO2 Content 38.6 mmol/L      Barometric Pressure for Blood Gas --     Comment: N/A        Modality BiPap     FIO2 50 %      PEEP 5     PIP 12 cmH2O      Hemodilution No     Respiratory Rate 16    COVID-19, ABBOTT IN-HOUSE,NP Swab (NO TRANSPORT MEDIA) 2 HR TAT - Swab, Nasopharynx [[id number]]  (Normal) Collected:   08/01/20 0014    Specimen:  Swab from Nasopharynx Updated:  08/01/20 0040     COVID19 Not Detected    Narrative:       Fact sheet for providers: https://www.fda.gov/media/694526/download     Fact sheet for patients: https://www."[employer]".gov/media/639290/download          MICRO:  Microbiology Results (last 10 days)     Procedure Component Value - Date/Time    COVID-19, ABBOTT IN-HOUSE,NP Swab (NO TRANSPORT MEDIA) 2 HR TAT - Swab, Nasopharynx [[id number]]  (Normal) Collected:  08/01/20 0014    Lab Status:  Final result Specimen:  Swab from Nasopharynx Updated:  08/01/20 0040     COVID19 Not Detected    Narrative:       Fact sheet for providers: https://www.fda.gov/media/959566/download     Fact sheet for patients: https://www."[employer]".gov/media/361758/download          RADIOLOGY STUDIES:  No Radiology Exams Resulted Within Past 24 Hours    ECHOCARDIOGRAM:  Results for orders placed during the hospital encounter of 03/15/20   Adult Transthoracic Echo Complete W/ Cont if Necessary Per Protocol    Narrative · The following left ventricular wall segments are hypokinetic: mid   anterior, apical anterior, basal anterolateral, mid anterolateral, apical   lateral, basal inferolateral, mid inferolateral, apical inferior, mid   inferior, apical septal, basal inferoseptal, mid inferoseptal, apex   hypokinetic, mid anteroseptal, basal anterior, basal inferior and basal   inferoseptal.  · Left ventricular systolic function is moderately decreased.  · Right ventricular cavity is borderline dilated.  · Left atrial cavity size is borderline dilated.  · Mild to moderate aortic valve regurgitation is present.           I reviewed the patient's new clinical results.      MEDICATIONS     SCHEDULED MEDICATIONS:    budesonide 0.5 mg Nebulization BID - RT   furosemide 40 mg Intravenous Q8H   heparin (porcine) 5,000 Units Subcutaneous Q8H   ipratropium-albuterol 3 mL Nebulization 4x Daily - RT   lamoTRIgine 100 mg Oral Q PM   lamoTRIgine 200 mg Oral  QAM   losartan 100 mg Oral Daily   PARoxetine 40 mg Oral Daily   roflumilast 500 mcg Oral Daily   sodium chloride 10 mL Intravenous Q12H        CONTINUOUS INFUSIONS:    niCARdipine 2.5-15 mg/hr        PRN MEDICATIONS:   •  acetaminophen **OR** acetaminophen  •  aluminum-magnesium hydroxide-simethicone  •  bisacodyl  •  hydrALAZINE  •  ipratropium-albuterol  •  magnesium hydroxide  •  magnesium sulfate **OR** magnesium sulfate in D5W 1g/100mL (PREMIX)  •  ondansetron **OR** ondansetron  •  potassium chloride **OR** potassium chloride **OR** potassium chloride  •  [COMPLETED] Insert peripheral IV **AND** sodium chloride  •  sodium chloride     ASSESSMENT & PLAN     Acute respiratory failure with hypercapnia and hypoxia  -Likely multifactorial, with CHF/COPD exacerbation  -ABG reviewed, placed on BiPAP in the ED, repeat ABG with no improvement, changed to AVAPS  -Admit to ICU, high risk for intubation  -CXR-pending  -Oxygen supplementation as needed, titrate FiO2 to maintain patient's oxygen saturations greater than 91%    Acute COPD exacerbation  -COVID-19 negative  -No evidence of infectious process, will hold on antibiotics at this time  -CXR reviewed.  -No indication for IV/p.o. steroids at this time  -Continue Pulmicort twice daily, DuoNeb scheduled and as needed, Daliresp  -Recommend follow-up in outpatient pulmonary clinic    Acute exacerbation of heart failure with reduced ejection fraction  -BNP 9541 on admission  -Lasix 60 mg given x 1, continue Lasix 40 mg IV TID, continue diuresis as tolerated  -Reviewed previous Echo, EF 44%    Hypertensive urgency, history of essential hypertension  -Continue Losartan  -IV Lopressor x 1 dose given, no further BB 2/2 SB  -Cardene gtt ordered, titrate for SBP < 160    Sinus bradycardia, H/O Dysrhythmia  -Hold amiodarone, metoprolol XL  -No evidence of chronic anticoagulation  -EKG reviewed    CAD, S/P CABG  -Troponin <0.010, monitor serial troponins  -Check lipid panel in  AM    Confusion/Altered mental status; H/O memory loss 2/2 TBI  -Only 1 SIRS criteria, normal WBC, afebrile  -UA negative  -Check UDS-pending    Bipolar affective disorder  -Continue Lamictal, Paxil  -Holding Seroquel until patient mental status improved    Accuchecks with SSI  Code Status: CPR, Full Interventions  VTE Prophylaxis: Heparin/SCDs  PUD Prophylaxis: Pepcid    I discussed the patient’s findings and my recommendations with patient, nursing staff and ED Provider.  I have discussed plan with on-call attending physician, who agrees with this plan of care.  This note has been scribed by me for Dr. Upton.    Appropriate PPE worn during assessment of patient per established guidelines.    Electronically signed by:  DEEPTI Griffiths  [facility], [facility].  08/01/2020    Addendum  I have seen this patient independently and reviewed the medical records, labs and imaging and I agree with the note above as scribed for me by DEEPTI. I have corrected the note above for accuracy.    Patient needs a CT of the head.  Start on Precedex.  We will get a CT chest as well.  Unclear reason for his encephalopathy.  Unclear baseline.    32 minutes of critical care provided. This time excludes other billable procedures. Time does include preparation of documents, medical consultations, review of old records, and direct bedside care.     Tucker Upton MD

## 2020-08-01 NOTE — PLAN OF CARE
Pt admitted to [facility] from ED. KPA saw patient at bedside and orders initiated. Restraints started after patient tried repeatedly to get out of bed and began pulling on catheter tubing. Cardene to control hypertension. Will continue to monitor closely.

## 2020-08-01 NOTE — PLAN OF CARE
Pt still confuse and  in restraints will continue to monitor  Problem: Patient Care Overview  Goal: Plan of Care Review  8/1/2020 1430 by Nam Darby RN  Outcome: Ongoing (interventions implemented as appropriate)  8/1/2020 0841 by Nam Darby RN  Outcome: Ongoing (interventions implemented as appropriate)  Goal: Individualization and Mutuality  8/1/2020 1430 by Nam Darby RN  Outcome: Ongoing (interventions implemented as appropriate)  8/1/2020 0841 by Nam Darby RN  Outcome: Ongoing (interventions implemented as appropriate)  Goal: Discharge Needs Assessment  8/1/2020 1430 by Nam Darby RN  Outcome: Ongoing (interventions implemented as appropriate)  8/1/2020 0841 by Nam Darby RN  Outcome: Ongoing (interventions implemented as appropriate)  Goal: Interprofessional Rounds/Family Conf  8/1/2020 1430 by Nam Darby RN  Outcome: Ongoing (interventions implemented as appropriate)  8/1/2020 0841 by Nam Darby RN  Outcome: Ongoing (interventions implemented as appropriate)     Problem: Fall Risk (Adult)  Goal: Identify Related Risk Factors and Signs and Symptoms  Description  Related risk factors and signs and symptoms are identified upon initiation of Human Response Clinical Practice Guideline (CPG).  8/1/2020 1430 by Nam Darby RN  Outcome: Ongoing (interventions implemented as appropriate)  8/1/2020 0841 by Nam Darby RN  Outcome: Ongoing (interventions implemented as appropriate)  Goal: Absence of Fall  Description  Patient will demonstrate the desired outcomes by discharge/transition of care.  8/1/2020 1430 by Nam Darby RN  Outcome: Ongoing (interventions implemented as appropriate)  8/1/2020 0841 by Nam Darby RN  Outcome: Ongoing (interventions implemented as appropriate)     Problem: Restraint, Nonbehavioral (Nonviolent)  Goal: Rationale and Justification  8/1/2020 1430 by Nam Darby RN  Outcome: Ongoing  (interventions implemented as appropriate)  8/1/2020 0841 by Nam Darby RN  Outcome: Ongoing (interventions implemented as appropriate)  Goal: Nonbehavioral (Nonviolent) Restraint: Absence of Injury/Harm  8/1/2020 1430 by Nam Darby RN  Outcome: Ongoing (interventions implemented as appropriate)  8/1/2020 0841 by Nam Darby RN  Outcome: Ongoing (interventions implemented as appropriate)  Goal: Nonbehavioral (Nonviolent) Restraint: Achievement of Discontinuation Criteria  8/1/2020 1430 by Nam Darby RN  Outcome: Ongoing (interventions implemented as appropriate)  8/1/2020 0841 by Nam Darby RN  Outcome: Ongoing (interventions implemented as appropriate)  Goal: Nonbehavioral (Nonviolent) Restraint: Preservation of Dignity and Wellbeing  8/1/2020 1430 by Nam Darby RN  Outcome: Ongoing (interventions implemented as appropriate)  8/1/2020 0841 by Nam Darby RN  Outcome: Ongoing (interventions implemented as appropriate)     Problem: Skin Injury Risk (Adult)  Goal: Identify Related Risk Factors and Signs and Symptoms  Description  Related risk factors and signs and symptoms are identified upon initiation of Human Response Clinical Practice Guideline (CPG).  8/1/2020 1430 by Nam Darby RN  Outcome: Ongoing (interventions implemented as appropriate)  8/1/2020 0841 by Nam Darby RN  Outcome: Ongoing (interventions implemented as appropriate)  Goal: Skin Health and Integrity  Description  Patient will demonstrate the desired outcomes by discharge/transition of care.  8/1/2020 1430 by Nam Darby RN  Outcome: Ongoing (interventions implemented as appropriate)  8/1/2020 0841 by Nam Darby RN  Outcome: Ongoing (interventions implemented as appropriate)

## 2020-08-01 NOTE — PLAN OF CARE
Pt confused getting a sitter  Problem: Patient Care Overview  Goal: Plan of Care Review  Outcome: Ongoing (interventions implemented as appropriate)  Goal: Individualization and Mutuality  Outcome: Ongoing (interventions implemented as appropriate)  Goal: Discharge Needs Assessment  Outcome: Ongoing (interventions implemented as appropriate)  Goal: Interprofessional Rounds/Family Conf  Outcome: Ongoing (interventions implemented as appropriate)     Problem: Fall Risk (Adult)  Goal: Identify Related Risk Factors and Signs and Symptoms  Description  Related risk factors and signs and symptoms are identified upon initiation of Human Response Clinical Practice Guideline (CPG).  Outcome: Ongoing (interventions implemented as appropriate)  Goal: Absence of Fall  Description  Patient will demonstrate the desired outcomes by discharge/transition of care.  Outcome: Ongoing (interventions implemented as appropriate)     Problem: Restraint, Nonbehavioral (Nonviolent)  Goal: Rationale and Justification  Outcome: Ongoing (interventions implemented as appropriate)  Goal: Nonbehavioral (Nonviolent) Restraint: Absence of Injury/Harm  Outcome: Ongoing (interventions implemented as appropriate)  Goal: Nonbehavioral (Nonviolent) Restraint: Achievement of Discontinuation Criteria  Outcome: Ongoing (interventions implemented as appropriate)  Goal: Nonbehavioral (Nonviolent) Restraint: Preservation of Dignity and Wellbeing  Outcome: Ongoing (interventions implemented as appropriate)     Problem: Skin Injury Risk (Adult)  Goal: Identify Related Risk Factors and Signs and Symptoms  Description  Related risk factors and signs and symptoms are identified upon initiation of Human Response Clinical Practice Guideline (CPG).  Outcome: Ongoing (interventions implemented as appropriate)  Goal: Skin Health and Integrity  Description  Patient will demonstrate the desired outcomes by discharge/transition of care.  Outcome: Ongoing (interventions  implemented as appropriate)

## 2020-08-01 NOTE — ED PROVIDER NOTES
Subjective   Patient is a 75-year-old male sent in by his daughter due to decreased responsiveness over the past several days and apparent shortness of breath.  The patient is unable offer complaints due to confusion.          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   • Memory loss, short term    • Sepsis (CMS/[facility])        No Known Allergies    Past Surgical History:   Procedure Laterality Date   • ABDOMINAL AORTIC ANEURYSM REPAIR     • CHOLECYSTECTOMY     • CORONARY ANGIOPLASTY     • CORONARY ARTERY BYPASS GRAFT  03/13/2014   • THORACOTOMY Right    • TOTAL HIP ARTHROPLASTY Left    • TOTAL HIP ARTHROPLASTY Right 02/03/2014       Family History   Problem Relation Age of Onset   • Heart disease Other    • Hypertension Other    • Hyperlipidemia Other        Social History     Socioeconomic History   • Marital status:      Spouse name: Not on file   • Number of children: Not on file   • Years of education: Not on file   • Highest education level: Not on file   Tobacco Use   • Smoking status: Former Smoker     Types: Cigarettes   • Smokeless tobacco: Never Used   Substance and Sexual Activity   • Alcohol use: Not Currently     Frequency: Never     Comment: former ETOH abuse Hx   • Drug use: Not Currently   • Sexual activity: Defer           Objective   Physical Exam  HEENT exam shows TMs to be clear.  Oropharynx comers but sclerae nonicteric.  Neck has no adenopathy JVD or bruits.  Lungs are significant extra wheezes at the base.  Heart has a regular rate rhythm without murmur rub or gallop.  Chest is nontender.  Abdomen is soft and nontender.  Patient has normal bowel sounds  without rebound or guarding.  Back has no CVA tenderness.  Extremity exam is no cyanosis or edema.  Procedures     My EKG interpretation shows normal sinus rhythm with no acute ST change      ED Course            Results for orders placed or performed during the hospital encounter of 07/31/20   Basic Metabolic Panel   Result Value Ref Range    Glucose 108 (H) 65 - 99 mg/dL    BUN      Creatinine 0.85 0.76 - 1.27 mg/dL    Sodium 140 136 - 145 mmol/L    Potassium 4.3 3.5 - 5.2 mmol/L    Chloride 98 98 - 107 mmol/L    CO2 33.0 (H) 22.0 - 29.0 mmol/L    Calcium 8.8 8.6 - 10.5 mg/dL    eGFR Non African Amer 88 >60 mL/min/1.73    BUN/Creatinine Ratio      Anion Gap 9.0 5.0 - 15.0 mmol/L   BNP   Result Value Ref Range    proBNP 9,541.0 (H) 0.0-1,800.0 pg/mL   Troponin   Result Value Ref Range    Troponin T <0.010 0.000 - 0.030 ng/mL   CBC Auto Differential   Result Value Ref Range    WBC 6.30 3.40 - 10.80 10*3/mm3    RBC 3.27 (L) 4.14 - 5.80 10*6/mm3    Hemoglobin 9.4 (L) 13.0 - 17.7 g/dL    Hematocrit 29.7 (L) 37.5 - 51.0 %    MCV 90.7 79.0 - 97.0 fL    MCH 28.6 26.6 - 33.0 pg    MCHC 31.6 31.5 - 35.7 g/dL    RDW 15.1 12.3 - 15.4 %    RDW-SD 49.0 37.0 - 54.0 fl    MPV 6.8 6.0 - 12.0 fL    Platelets 245 140 - 450 10*3/mm3    Neutrophil % 79.6 (H) 42.7 - 76.0 %    Lymphocyte % 9.3 (L) 19.6 - 45.3 %    Monocyte % 9.7 5.0 - 12.0 %    Eosinophil % 0.7 0.3 - 6.2 %    Basophil % 0.7 0.0 - 1.5 %    Neutrophils, Absolute 5.10 1.70 - 7.00 10*3/mm3    Lymphocytes, Absolute 0.60 (L) 0.70 - 3.10 10*3/mm3    Monocytes, Absolute 0.60 0.10 - 0.90 10*3/mm3    Eosinophils, Absolute 0.00 0.00 - 0.40 10*3/mm3    Basophils, Absolute 0.00 0.00 - 0.20 10*3/mm3    nRBC 0.1 0.0 - 0.2 /100 WBC   BUN   Result Value Ref Range    BUN 20 8 - 23 mg/dL   Blood Gas, Arterial   Result Value Ref Range    Site Right Radial     Andres's Test Positive     pH, Arterial 7.299 (L) 7.350 - 7.450 pH units    pCO2, Arterial 75.1 (C) 35.0 - 48.0 mm Hg    pO2,  Arterial 67.0 (L) 83.0 - 108.0 mm Hg    HCO3, Arterial 36.8 (H) 21.0 - 28.0 mmol/L    Base Excess, Arterial 8.5 (H) 0.0 - 3.0 mmol/L    O2 Saturation, Arterial 89.6 (L) 94.0 - 98.0 %    CO2 Content 39.2 (H) 22 - 29 mmol/L    Barometric Pressure for Blood Gas      Modality Cannula     FIO2 <21 %    Hemodilution No    Blood Gas, Arterial   Result Value Ref Range    Site Right Radial     Andres's Test Positive     pH, Arterial 7.290 (L) 7.350 - 7.450 pH units    pCO2, Arterial 75.5 (C) 35.0 - 48.0 mm Hg    pO2, Arterial 112.7 (H) 83.0 - 108.0 mm Hg    HCO3, Arterial 36.3 (H) 21.0 - 28.0 mmol/L    Base Excess, Arterial 7.9 (H) 0.0 - 3.0 mmol/L    O2 Saturation, Arterial 97.5 94.0 - 98.0 %    CO2 Content 38.6 (H) 22 - 29 mmol/L    Barometric Pressure for Blood Gas      Modality BiPap     FIO2 50 %    PEEP 5     PIP 12 cmH2O    Hemodilution No     Respiratory Rate 16    POC Glucose Once   Result Value Ref Range    Glucose 116 (H) 70 - 105 mg/dL   Light Blue Top   Result Value Ref Range    Extra Tube hold for add-on      No radiology results for the last day                                    MDM  Number of Diagnoses or Management Options  Diagnosis management comments: Patient finds consistent with hypercarbic respiratory failure.  Patient was placed on BiPAP with minimal improvement he will be switched to a VATS.  There is no evidence of infectious process at this time.  COVID is pending.  There is no metabolic abnormalities noted.  Patient will be admitted to the intensive care unit with a diagnosis of dyspnea COPD exacerbation and mental status change.  I did speak to the on-call intensivist.    Risk of Complications, Morbidity, and/or Mortality  Presenting problems: high  Diagnostic procedures: high  Management options: high    Critical Care  Total time providing critical care: 30-74 minutes      Final diagnoses:   Dyspnea, unspecified type   COPD exacerbation (CMS/HCC)   Acute on chronic respiratory failure with  hypoxia and hypercapnia (CMS/HCC)   Altered mental status, unspecified altered mental status type            Willi Santamaria MD  08/01/20 0020

## 2020-08-02 PROBLEM — I71.019 DISSECTION OF THORACIC AORTA (HCC): Status: ACTIVE | Noted: 2020-01-01

## 2020-08-02 NOTE — PROGRESS NOTES
The patient was admitted with CO2 retention.  He has had a previous CABG.  On plain CT of the chest and abnormality was reported, but I really do not see it.  I do not think there is an aortic dissection or hematoma, but we will get a CTA with contrast to double check.

## 2020-08-02 NOTE — CONSULTS
picc tip consult:  5 fr triple lumen power picc placed to rue basilic vein per md order. Pt to receive vesicants. picc tip located lower svc/caj per sapiens device. picc okay for use. rn aware

## 2020-08-02 NOTE — PROGRESS NOTES
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  The flap looks a little bit thick to me and I wonder if this is a chronic dissection.  I reviewed his CTs from the past and none of them have contrast in the ascending aorta.  The plain CT does look different this time.  At any rate with his multiple medical issues and his dementia we would not recommend reoperative surgery.  I discussed this with his wife and she is in agreement.  If this is an acute dissection it could rupture and he could have a fatal event.  Sometimes these acute aortic ascending dissections can shear off bypass grafts and cause acute myocardial infarction.  We do not see this right now.    At any rate we will treat medically.  I discussed this with the acute care nurse practitioner in the unit.  Blood pressure control is the way to go at this point.

## 2020-08-02 NOTE — PROGRESS NOTES
[facility]/PULM/CC PROGRESS NOTE       HISTORY OF PRESENT ILLNESS:  Adolfo Alejandro is a 75 y.o. 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  Patient is with confusion, and no family is currently at bedside to provide further information regarding patient's clinical presentation.  Patient is confused, but is oriented to person and place.  Patient denies any gross complaints with the exception that he cannot breathe while on BiPAP.     In the ED, labs were obtained and as follows: Troponin <0.010, BNP 9541, glucose 108, CO2 33, WBC 6.3, hemoglobin 9.4, hematocrit 29.7.  ABG: pH 7.299, PCO2 75.1, PO2 67.0, HCO3 36.8, O2 saturation 89.6.  After ABG was obtained, patient was placed on BiPAP, in which a repeat ABG was obtained.  ABG: pH 7.290, PCO2 75.5, PaO2 112.7, HCO3 36.3, and O2 sat 97.5.  It was at this time, that patient was transitioned to AVAPS.  UA was obtained and negative.  Patient shows no evidence of infectious process at this time.  COVID-19 was checked and negative.     [facility] was contacted for admission to ICU and further evaluation and treatment.     INSPECT REPORT:  INSPECT report completed with most recent prescriptions of: Morphine sulfate extended release 15 mg tablet, 60 tablets for 30-day supply filled on 7/18/2020; oxycodone-acetaminophen  mg, 150 tablets for 30-day supply filled on 7/5/2020.       SUBJECTIVE    8/2: Confused and drowsy.  Cardene drip added.  Did not tolerate NIPPV last night.  Currently on 10 L high flow O2    OBJECTIVE    Vitals:    08/02/20 1045 08/02/20 1105 08/02/20 1110 08/02/20 1115   BP: 168/63   (!) 172/117   Pulse: 81 78  85   Resp:  16 16    Temp:       TempSrc:       SpO2: 99% 99%  100%   Weight:       Height:          Intake/Output last 3  shifts:  I/O last 3 completed shifts:  In: 0   Out: 6425 [Urine:6425]  Intake/Output this shift:  No intake/output data recorded.    PHYSICAL EXAM:       Constitutional:  Chronically ill appearing, drowsy  Eyes:  PERRL, conjunctiva normal, EOMI  HENT:  Atraumatic, external ears normal, nose normal. Neck- normal range of motion, no tenderness, supple, trachea midline  Respiratory:  Bibasilar diminished breath sounds fine basilar crackles, occasional scattered expiratory wheezes, non-labored respirations without accessory muscle use  Cardiovascular:  Rhythm, no murmurs, no gallops, no rubs   GI:  Soft, nondistended, normal bowel sounds, nontender, no rebound or guarding  :   Deferred  Musculoskeletal:  No edema, no clubbing or cyanosis  Integument:  Well hydrated, no rash   Neurologic:   Drowsy and confused.  No lateralizing deficits, follows some simple commands  Psychiatric:   Unable to evaluate    Scheduled Meds:  budesonide 0.5 mg Nebulization BID - RT   famotidine 20 mg Intravenous Daily   furosemide 40 mg Intravenous Q8H   heparin (porcine) 5,000 Units Subcutaneous Q8H   ipratropium-albuterol 3 mL Nebulization 4x Daily - RT   lamoTRIgine 100 mg Oral Nightly   lamoTRIgine 200 mg Oral Daily   losartan 100 mg Oral Daily   PARoxetine 40 mg Oral Daily   roflumilast 500 mcg Oral Daily   sodium chloride 10 mL Intravenous Q12H       Continuous Infusions:  dexmedetomidine 0.2-1.5 mcg/kg/hr Last Rate: 0.4 mcg/kg/hr (08/02/20 1131)   niCARdipine 2.5-15 mg/hr Last Rate: 7.5 mg/hr (08/02/20 0330)       PRN Meds:•  acetaminophen **OR** acetaminophen  •  aluminum-magnesium hydroxide-simethicone  •  bisacodyl  •  hydrALAZINE  •  ipratropium-albuterol  •  magnesium hydroxide  •  magnesium sulfate **OR** magnesium sulfate in D5W 1g/100mL (PREMIX)  •  ondansetron **OR** ondansetron  •  potassium chloride **OR** potassium chloride **OR** potassium chloride  •  [COMPLETED] Insert peripheral IV **AND** sodium chloride  •  sodium  chloride     LABS    CBC  Results from last 7 days   Lab Units 08/02/20  0332 08/01/20  0343 07/31/20  2255   WBC 10*3/mm3 15.60* 6.00 6.30   RBC 10*6/mm3 4.03* 3.50* 3.27*   HEMOGLOBIN g/dL 11.5* 10.1* 9.4*   HEMATOCRIT % 35.2* 31.3* 29.7*   MCV fL 87.5 89.3 90.7   PLATELETS 10*3/mm3 365 273 245       CMP Results from last 7 days   Lab Units 08/02/20  0332 08/01/20  0343 07/31/20  2255   SODIUM mmol/L 142 139 140   POTASSIUM mmol/L 2.9* 3.7 4.3   CHLORIDE mmol/L 87* 95* 98   CO2 mmol/L 41.0* 37.0* 33.0*   BUN  24* 19 20   CREATININE mg/dL 1.13 0.78 0.85   GLUCOSE mg/dL 119* 104* 108*   ALBUMIN g/dL 4.20 3.90  --    BILIRUBIN mg/dL 0.9 0.4  --    ALK PHOS U/L 133* 114  --    AST (SGOT) U/L 38 20  --    ALT (SGPT) U/L 16 14  --        TROPONIN  Results from last 7 days   Lab Units 08/01/20 2002   TROPONIN T ng/mL <0.010       CoAg        ABG  Results from last 7 days   Lab Units 08/02/20  0347 08/01/20  1043 08/01/20  0331 08/01/20  0008 07/31/20  2327   PH, ARTERIAL pH units 7.576* 7.433 7.363 7.290* 7.299*   PCO2, ARTERIAL mm Hg 51.1* 59.8* 70.1* 75.5* 75.1*   PO2 ART mm Hg 81.2* 56.9* 59.8* 112.7* 67.0*   O2 SATURATION ART % 97.2 88.9* 88.1* 97.5 89.6*   BASE EXCESS ART mmol/L 22.3* 13.1* 12.0* 7.9* 8.5*       Microbiology  Microbiology Results (last 10 days)     Procedure Component Value - Date/Time    COVID-19, [facility] IN-HOUSE,NP Swab (NO TRANSPORT MEDIA) 2 HR TAT - Swab, Nasopharynx [[id number]]  (Normal) Collected:  08/01/20 0014    Lab Status:  Final result Specimen:  Swab from Nasopharynx Updated:  08/01/20 0040     COVID19 Not Detected    Narrative:       Fact sheet for providers: https://www.fda.gov/media/191054/download     Fact sheet for patients: https://www.fda.gov/media/520938/download          IMAGING & OTHER STUDIES    Imaging Results (Last 72 Hours)     Procedure Component Value Units Date/Time    CT Angiogram Chest With & Without Contrast [[id number]] Collected:  08/02/20 1044     Updated:   08/02/20 1054    Narrative:          DATE OF EXAM:  8/2/2020 9:12 AM     PROCEDURE:  CT ANGIOGRAM CHEST W WO CONTRAST-     INDICATIONS:   Aortic dz, non-traumatic, known or suspect; R06.00-Dyspnea, unspecified;  J44.1-Chronic obstructive pulmonary disease with (acute) exacerbation;  J96.21-Acute and chronic respiratory failure with hypoxia; J96.22-Acute  and chronic respiratory failure with hypercapnia; R41.82-Altered mental  status, unspecified     COMPARISON:   CT the chest with contrast PE protocol performed on 10/02/2017     TECHNIQUE:  Contiguous axial imaging was obtained from the thoracic inlet through  the upper abdomen following the intravenous administration of 100 mL  Isovue 370. Reconstructed coronal and sagittal images were also  obtained. In addition, a 3 D volume rendered image was obtained after  post processing. Automated exposure control and iterative reconstruction  methods were used.     FINDINGS:  VASCULAR FINDINGS: The present study reveals that there is an abnormal  dissection and aneurysm of the descending thoracic aorta. The dissection  starts just above the aortic valve and is proximal to the origin of the  brachiocephalic artery. The dissection measures 7.2 cm in length the  distal end of the dissection lies 4.5 cm proximal to the origin of the  brachycephalic artery. The descending thoracic aorta measures 5.2 cm in  diameter by 5.7 cm in diameter. Calcified atherosclerotic plaque can be  seen in the left coronary artery branches and in the right coronary  artery. The dissection does not appear to involve the right or left  coronary artery origins. The heart size normal. No evidence of  pericardial effusion. No evidence of hiatal hernia. No evidence of blood  in the mediastinum. No evidence of mass or adenopathy in the mediastinum  or in the right or left pulmonary tito. The patient is status post  sternotomy. No evidence of mass or adenopathy in the base the neck or in  the periclavicular  soft tissues or the axillary soft tissues. The  patient is status post cholecystectomy. There are surgical clips in the  gallbladder fossa. There is mild dilatation of the intrahepatic biliary  ducts and moderate dilatation of the common hepatic duct and common bile  duct. This is probably chronic and most likely acute. Correlation with  liver enzymes BE helpful. There is stent graft repair of abdominal  aortic aneurysm. No evidence of mass or adenopathy or ascites in the  abdomen. There are bilateral diffuse increased lung markings especially  in the lower lobes consistent with chronic lung disease. There are small  very small focal infiltrates in the lung bases bilaterally that might be  caused by inflammation or infection or scarring. No evidence of pleural  effusion or pneumothorax or mass in the right or left lungs.                Impression:          1. Dissection of the descending thoracic aorta that starts just above  the aortic valve and its distal and lies proximal to the origin of the  brachycephalic artery.  2. Aneurysmal dilatation of the ascending thoracic aorta  3. Very small focal infiltrates in the posterior lung bases bilaterally  might be caused by inflammation or infection.  4. Chronic lung disease.  5. Status post cholecystectomy. Dilatation of bile ducts might be acute  or chronic. Correlation with liver enzymes would BE helpful.     Electronically Signed By-DR. Keyon Laguerre MD On:8/2/2020 10:52  AM  This report was finalized on [id number] by DR. Keyon Laguerre MD.    CT Head Without Contrast [[id number]] Collected:  08/01/20 1818     Updated:  08/01/20 1821    Narrative:       Examination: CT HEAD WO CONTRAST-     Date of Exam: 8/1/2020 5:55 PM     Indication: Encephalopathy.     Comparison: None available.     Technique: Axial noncontrast CT imaging of the head was performed.  Automated exposure control and iterative reconstruction methods were  used.      Findings:  Superficial soft tissues appear within normal limits. The calvarium is  intact.  Paranasal sinuses and mastoid air cells appear well aerated.   Orbits are unremarkable.  There is no acute intracranial hemorrhage.  No  mass effect or midline shift.  No abnormal extra-axial collections.   Gray-white differentiation is within normal limits.  There is extensive  periventricular white matter hypoattenuation. This appears unchanged  from the prior study. Ventricular size and configuration is normal for  age.          Impression:          1. No acute intracranial abnormality.  2. Stable advanced chronic small vessel ischemic change.     Electronically Signed By-Rubén Little On:8/1/2020 6:19 PM  This report was finalized on [id number] by  Rubén Little, .    CT Chest Without Contrast [[id number]] Collected:  08/01/20 1810     Updated:  08/01/20 1818    Narrative:       Examination: CT CHEST WO CONTRAST-     Date of Exam: 8/1/2020 5:55 PM     Indication: Respiratory failure, not elsewhere classified;  R06.00-Dyspnea, unspecified; J44.1-Chronic obstructive pulmonary disease  with (acute) exacerbation; J96.21-Acute and chronic respiratory failure  with hypoxia; J96.22-Acute and chronic respiratory failure with  hypercapnia; R41.82-Altered mental status, unspecified.     Comparison: 10/02/2017.     Technique: Non-contrast axial volumetric CT imaging of the chest was  performed. Automated exposure control and iterative reconstruction  methods were used.     Findings:  There appears to be possible development of a crescentic periaortic  isodense collection, which would raise concern for periaortic hematoma,  intimal hematoma or dissection. There is evidence of prior median  sternotomy and CABG. There is extensive native coronary artery  calcification and prominent aortic and mitral annular calcification.  There is no pericardial effusion. The trachea and esophagus appear  within normal limits. The thyroid is  normal.     There is no pneumothorax, pleural effusion or focal airspace  consolidation. There is bibasilar subsegmental atelectasis versus  scarring. There is some debris within the right lower lobe bronchus.  There is mild diffuse peribronchial thickening. There are no discrete  measurable concerning lung nodules.     Subcutaneous fat and underlying musculature appear within normal limits.  There is a chronic compression fracture at T12 status post kyphoplasty.  There is a moderate anterior wedging compression fracture at T8 with  approximately 50% loss of height anteriorly. This is age-indeterminate,  but appears new since 2017. There are moderate lower cervical and mild  multilevel thoracic degenerative changes. Limited images of the upper  abdomen demonstrate no acute findings. There is a partially visualized  stent graft repair of infrarenal abdominal aortic aneurysm.       Impression:          1. There is concern for a new crescentic periaortic collection at the  ascending aorta, which raises concern for aortic wall hematoma,  dissection or aneurysm. Recommend contrast-enhanced study of the chest  for further evaluation.  2. Severe coronary artery disease status post CABG.  3. There is an age-indeterminate compression fracture with 50% loss of  height at T8. This is new when compared with 2017.  4. Cervical and thoracic degenerative changes.     Electronically Signed By-Rubén Little On:8/1/2020 6:16 PM  This report was finalized on [id number] by  Rubén Little, .    XR Chest 1 View [[id number]] Collected:  08/01/20 0813     Updated:  08/01/20 0821    Narrative:       Examination: XR CHEST 1 VW-     Date of Exam: 7/31/2020 11:05 PM     Indication: Dyspnea.     Comparison: 03/15/2020.     Technique: Single radiographic view of the chest was obtained.     Findings:  There are chronic interstitial opacities in the lungs primarily in the  bases. There is scarring in the right lung base with elevated  right  hemidiaphragm. The cardiac silhouette appears enlarged. No acute osseous  abnormalities. No pneumothorax, pleural effusion or new lung  consolidation. There is evidence of prior median sternotomy and CABG.       Impression:       1.Chronic interstitial lung disease without superimposed acute process  of the chest.  2.2. Cardiomegaly and stable postoperative findings.     Electronically Signed By-Rubén Little On:8/1/2020 8:19 AM  This report was finalized on [id number] by  Rubén Little, .        Results for orders placed during the hospital encounter of 03/15/20   Adult Transthoracic Echo Complete W/ Cont if Necessary Per Protocol    Narrative · The following left ventricular wall segments are hypokinetic: mid   anterior, apical anterior, basal anterolateral, mid anterolateral, apical   lateral, basal inferolateral, mid inferolateral, apical inferior, mid   inferior, apical septal, basal inferoseptal, mid inferoseptal, apex   hypokinetic, mid anteroseptal, basal anterior, basal inferior and basal   inferoseptal.  · Left ventricular systolic function is moderately decreased.  · Right ventricular cavity is borderline dilated.  · Left atrial cavity size is borderline dilated.  · Mild to moderate aortic valve regurgitation is present.             Acute respiratory failure with hypercapnia and hypoxia  -Likely multifactorial, with CHF/COPD exacerbation  -ABG reviewed, attempted AVAPS overnight, poorly tolerated.  Currently on O2 10 L high flow  -Admit to ICU, high risk for intubation  -CXR-reviewed  -Oxygen supplementation as needed, titrate FiO2 to maintain patient's oxygen saturations greater than 91%     Acute COPD exacerbation  -COVID-19 negative  -No evidence of infectious process, will hold on antibiotics at this time  -CXR reviewed.  -No indication for IV/p.o. steroids at this time  -Continue Pulmicort twice daily, DuoNeb scheduled and as needed, Daliresp  -Recommend follow-up in outpatient pulmonary  clinic     Acute exacerbation of heart failure with reduced ejection fraction  -BNP 9541 on admission  -Continue scheduled diuresis  -Reviewed previous Echo, EF 44%    Dissection of the descending thoracic aorta  -CV surgery consulted, evaluated CT and CTA with the opinion that the dissection is isolated to the ascending aorta.  The flap looks a little bit thick possibly representing a chronic dissection.  The patient is a poor surgical candidate which was discussed by Dr. Isaacs with the patient's wife and surgery is not recommended.  Comanagement with strict blood pressure control.  Cardene drip added     Hypertensive urgency, history of essential hypertension  -Continue Losartan  -IV Lopressor x 1 dose given, no further BB 2/2 SB  -Cardene gtt ordered, titrate for SBP < 120     Sinus bradycardia, resolved  -Hold amiodarone, metoprolol XL  -No evidence of chronic anticoagulation  -EKG reviewed     CAD, S/P CABG  -Troponin <0.010, monitor serial troponins, remained flat  -Lipid profile reviewed     Confusion/Altered mental status; H/O memory loss 2/2 TBI  -Only 1 SIRS criteria, normal WBC, afebrile  -UA negative  -UDS reviewed, positive for opiates     Bipolar affective disorder  -Continue Lamictal, Paxil  -Holding Seroquel until patient mental status improved     Accuchecks with SSI  Code Status: CPR, Full Interventions  VTE Prophylaxis: Heparin/SCDs  PUD Prophylaxis: Pepcid          Appropriate PPE worn during assessment of patient per established guidelines.    Addendum  I have seen this patient independently and reviewed the medical records, labs and imaging and I agree with the note above as scribed for me by APRN. I have corrected the note above for accuracy.     Worse today. Concern for dissection. Not a surgical candidate. High risk for intubation. Will discuss with family.     32 minutes of critical care provided. This time excludes other billable procedures. Time does include preparation of documents,  medical consultations, review of old records, and direct bedside care.      Tucker Upton MD

## 2020-08-03 NOTE — CONSULTS
Palliative Care Consultation    Patient Code Status    Code Status and Medical Interventions:   Ordered at: 08/01/20 0218     Level Of Support Discussed With:    Patient     Code Status:    CPR     Medical Interventions (Level of Support Prior to Arrest):    Full       Requesting clinician:  Consulting Physician(s)             None          Reason for consult: Consultation for clarification of goals of care and code status.      Chief Complaint    Decreased level of cosciousness    History of Present Illness    Adolfo Alejandro is a 75 y.o. male who presented to [facility] 7/31/20 ED with family concerns for changes in level of consciousness.  Also concerned for shortness of air.  Patient is currently in 4 point restraints and unresponsive, moans to movement but not responsive to questions.  No family at bedside, noted he has a son that lives with him.  Patient diagnosed with COPD exacerbation.  Dissection of the descending thoracic aorta with CT surgery evaluation and deemed not a surgical candidate.  Will attempt to contact family members for goal of care discussion.  Emotional support provided.      Advanced Care Planning    Advanced Directives: Patient does not have advance directive  Health Care Directive on file: No  Health Care Surrogate:      Comments: not in EMR    Assessment/Plan   Decreased level of response  Dyspnea secondary to COPD exacerbation  Aortic aneurysm dissection /not surgical candidate  Bipolar   CAD / CABG  History of traumatic brain injury    Active Problems:    Dyspnea    Dissection of thoracic aorta (CMS/[facility])      Plan    Ongoing discussion with family regarding goals of care / code status    Review of Systems   Unable to perform ROS: Patient nonverbal         Past Medical History:   Diagnosis Date   • Anxiety    • Arthritis    • Bipolar affective disorder (CMS/HCC)    • COPD (chronic obstructive pulmonary disease) (CMS/[facility])    • GERD (gastroesophageal reflux disease)    • Hypertension    •  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   • Memory loss, short term    • Sepsis (CMS/[facility])      Past Surgical History:   Procedure Laterality Date   • ABDOMINAL AORTIC ANEURYSM REPAIR     • CHOLECYSTECTOMY     • CORONARY ANGIOPLASTY     • CORONARY ARTERY BYPASS GRAFT  03/13/2014   • THORACOTOMY Right    • TOTAL HIP ARTHROPLASTY Left    • TOTAL HIP ARTHROPLASTY Right 02/03/2014     Family History   Problem Relation Age of Onset   • Heart disease Other    • Hypertension Other    • Hyperlipidemia Other      Social History     Tobacco Use   • Smoking status: Former Smoker     Types: Cigarettes   • Smokeless tobacco: Never Used   Substance Use Topics   • Alcohol use: Not Currently     Frequency: Never     Comment: former ETOH abuse Hx   • Drug use: Not Currently     Medications Prior to Admission   Medication Sig Dispense Refill Last Dose   • amiodarone (PACERONE) 200 MG tablet Take 100 mg by mouth Daily.      • budesonide (PULMICORT) 0.5 MG/2ML nebulizer solution Use bid VIA nebulizor   Dx j44.9 (Patient taking differently: Take 0.5 mg by nebulization 2 (Two) Times a Day.) 100 each 4 Taking   • furosemide (LASIX) 40 MG tablet TAKE 1 TABLET BY MOUTH DAILY 30 tablet 2    • ipratropium-albuterol (DUO-NEB) 0.5-2.5 mg/3 ml nebulizer USE 1 VIAL VIA NEBULIZER FOUR TIMES DAILY (Patient taking differently: Take 3 mL by nebulization 4 (Four) Times a Day.) 360 mL 5    • lamoTRIgine (LaMICtal) 100 MG tablet Take 200 mg by mouth Every Morning.  1 Taking   • lamoTRIgine (LaMICtal) 100 MG tablet Take 100 mg by mouth Every Evening.   Taking   • losartan (COZAAR) 100 MG tablet Take 100 mg by mouth Daily.   Taking   • melatonin 5 MG tablet tablet Take 20 mg by mouth Every Night.   Taking   • metoprolol succinate XL (TOPROL-XL) 100 MG 24 hr tablet TAKE ONE TABLET BY MOUTH DAILY 90 tablet 2    • Morphine (MS Contin) 15 MG 12 hr tablet Take 1 tablet by mouth 2 (Two) Times a Day. 60 tablet 0    • PARoxetine  (PAXIL) 40 MG tablet Take 40 mg by mouth Daily.  0 Taking   • QUEtiapine (SEROquel) 50 MG tablet Take 50 mg by mouth Every Night.  0 Taking   • roflumilast (DALIRESP) 500 MCG tablet tablet Take 1 tablet by mouth Daily. 30 tablet 6 Taking       Allergies  Patient has no known allergies.    Scheduled Meds:    amiodarone 100 mg Oral Daily   budesonide 0.5 mg Nebulization BID - RT   [START ON 8/4/2020] furosemide 40 mg Intravenous Daily   heparin (porcine) 5,000 Units Subcutaneous Q8H   ipratropium-albuterol 3 mL Nebulization 4x Daily - RT   lamoTRIgine 100 mg Oral Nightly   lamoTRIgine 200 mg Oral Daily   losartan 100 mg Oral Daily   metoprolol tartrate 50 mg Oral Q12H   PARoxetine 40 mg Oral Daily   QUEtiapine 50 mg Oral Nightly   sodium chloride 10 mL Intravenous Q12H     Continuous Infusions:    dexmedetomidine 0.2-1.5 mcg/kg/hr Last Rate: 0.5 mcg/kg/hr (08/03/20 1400)   niCARdipine 2.5-15 mg/hr Last Rate: 5 mg/hr (08/03/20 1534)     PRN Meds:  •  acetaminophen **OR** acetaminophen  •  aluminum-magnesium hydroxide-simethicone  •  bisacodyl  •  hydrALAZINE  •  ipratropium-albuterol  •  magnesium hydroxide  •  magnesium sulfate **OR** magnesium sulfate in D5W 1g/100mL (PREMIX)  •  morphine sulfate (concentrate)  •  ondansetron **OR** ondansetron  •  potassium chloride **OR** potassium chloride **OR** potassium chloride  •  [COMPLETED] Insert peripheral IV **AND** sodium chloride  •  sodium chloride    Lab Results (last 24 hours)     Procedure Component Value Units Date/Time    BUN [[id number]]  (Abnormal) Collected:  08/03/20 0416    Specimen:  Blood Updated:  08/03/20 0537     BUN 35 mg/dL     Magnesium [[id number]]  (Normal) Collected:  08/03/20 0416    Specimen:  Blood Updated:  08/03/20 0528     Magnesium 2.1 mg/dL     Phosphorus [[id number]]  (Normal) Collected:  08/03/20 0416    Specimen:  Blood Updated:  08/03/20 0528     Phosphorus 3.3 mg/dL     Comprehensive Metabolic Panel [[id number]]  (Abnormal) Collected:   08/03/20 0416    Specimen:  Blood Updated:  08/03/20 0528     Glucose 134 mg/dL      BUN --     Comment: Testing performed by alternate method        Creatinine 1.25 mg/dL      Sodium 146 mmol/L      Potassium 3.1 mmol/L      Chloride 94 mmol/L      CO2 38.0 mmol/L      Calcium 9.0 mg/dL      Total Protein 7.2 g/dL      Albumin 3.80 g/dL      ALT (SGPT) 21 U/L      AST (SGOT) 63 U/L      Alkaline Phosphatase 121 U/L      Total Bilirubin 0.8 mg/dL      eGFR Non African Amer 56 mL/min/1.73      Globulin 3.4 gm/dL      A/G Ratio 1.1 g/dL      BUN/Creatinine Ratio --     Comment: Testing not performed        Anion Gap 14.0 mmol/L     Narrative:       GFR Normal >60  Chronic Kidney Disease <60  Kidney Failure <15      CBC & Differential [[id number]] Collected:  08/03/20 0416    Specimen:  Blood Updated:  08/03/20 0423    Narrative:       The following orders were created for panel order CBC & Differential.  Procedure                               Abnormality         Status                     ---------                               -----------         ------                     CBC Auto Differential[[id number]]        Abnormal            Final result                 Please view results for these tests on the individual orders.    CBC Auto Differential [[id number]]  (Abnormal) Collected:  08/03/20 0416    Specimen:  Blood Updated:  08/03/20 0423     WBC 14.00 10*3/mm3      RBC 3.99 10*6/mm3      Hemoglobin 11.3 g/dL      Hematocrit 34.9 %      MCV 87.5 fL      MCH 28.3 pg      MCHC 32.3 g/dL      RDW 15.4 %      RDW-SD 47.7 fl      MPV 6.8 fL      Platelets 344 10*3/mm3      Neutrophil % 88.4 %      Lymphocyte % 3.2 %      Monocyte % 8.2 %      Eosinophil % 0.0 %      Basophil % 0.2 %      Neutrophils, Absolute 12.30 10*3/mm3      Lymphocytes, Absolute 0.40 10*3/mm3      Monocytes, Absolute 1.10 10*3/mm3      Eosinophils, Absolute 0.00 10*3/mm3      Basophils, Absolute 0.00 10*3/mm3      nRBC 0.0 /100 WBC     POC Glucose  Once [[id number]]  (Abnormal) Collected:  08/03/20 0417    Specimen:  Blood Updated:  08/03/20 0419     Glucose 140 mg/dL      Comment: Serial Number: [id number]:  [id number]       Magnesium [[id number]]  (Normal) Collected:  08/02/20 1901    Specimen:  Blood Updated:  08/02/20 1926     Magnesium 2.2 mg/dL     Potassium [[id number]]  (Abnormal) Collected:  08/02/20 1901    Specimen:  Blood Updated:  08/02/20 1923     Potassium 3.2 mmol/L     Hemoglobin & Hematocrit, Blood [[id number]]  (Abnormal) Collected:  08/02/20 1901    Specimen:  Blood Updated:  08/02/20 1910     Hemoglobin 11.5 g/dL      Hematocrit 35.5 %               Palliative Assessment     Vital Signs (last 24 hours)       08/02 0700  -  08/03 0659 08/03 0700  -  08/03 1548   Most Recent    Temp (°F) 97.3 -  99.4    97.9 -  100.5     97.9 (36.6)    Heart Rate 57 -  100    66 -  75     70    Resp 16 -  24      18     18    BP 87/46 -  174/71    144/35 -  153/75     144/35    SpO2 (%) 93 -  100    72 -  97     97        Physical Exam   Constitutional:   4 point restraints. Not verbally responsive. Moans to movement.   HENT:   Head: Normocephalic.   Eyes: Pupils are equal, round, and reactive to light.   Neck: Normal range of motion.   Cardiovascular: Regular rhythm.   Bradycardia at 54   Pulmonary/Chest:   Bilateral breath sounds, few scattered course rhonchi , no wheezing   Abdominal: Soft. Bowel sounds are normal.   Musculoskeletal: Normal range of motion.   Moving arm and legs    Neurological:   Lethargic / does not follow commands   Skin: Skin is warm and dry.   Vitals reviewed.        Decisional Capacity: no  Patient's understanding of illness:   Patient goals of care:          DEEPTI Shrestha

## 2020-08-03 NOTE — PLAN OF CARE
Problem: Patient Care Overview  Goal: Plan of Care Review  Outcome: Ongoing (interventions implemented as appropriate)  Variances  AICD placement  Bed not available  Flowsheets (Taken 8/3/2020 4774)  Plan of Care Reviewed With: patient; grandparent  Outcome Summary: pt remains vented and sedaied. Pt's the is current;y getting  Goal: Individualization and Mutuality  Outcome: Ongoing (interventions implemented as appropriate)  Goal: Discharge Needs Assessment  Outcome: Ongoing (interventions implemented as appropriate)  Goal: Interprofessional Rounds/Family Conf  Outcome: Ongoing (interventions implemented as appropriate)     Problem: Fall Risk (Adult)  Goal: Identify Related Risk Factors and Signs and Symptoms  Outcome: Ongoing (interventions implemented as appropriate)  Goal: Absence of Fall  Outcome: Ongoing (interventions implemented as appropriate)     Problem: Restraint, Nonbehavioral (Nonviolent)  Goal: Rationale and Justification  Outcome: Ongoing (interventions implemented as appropriate)  Goal: Nonbehavioral (Nonviolent) Restraint: Absence of Injury/Harm  Outcome: Ongoing (interventions implemented as appropriate)  Goal: Nonbehavioral (Nonviolent) Restraint: Achievement of Discontinuation Criteria  Outcome: Ongoing (interventions implemented as appropriate)  Goal: Nonbehavioral (Nonviolent) Restraint: Preservation of Dignity and Wellbeing  Outcome: Ongoing (interventions implemented as appropriate)     Problem: Skin Injury Risk (Adult)  Goal: Identify Related Risk Factors and Signs and Symptoms  Outcome: Ongoing (interventions implemented as appropriate)  Goal: Skin Health and Integrity  Outcome: Ongoing (interventions implemented as appropriate)

## 2020-08-03 NOTE — PROGRESS NOTES
[facility]/PULM/CC PROGRESS NOTE       HISTORY OF PRESENT ILLNESS:  Adolfo Alejandro is a 75 y.o. 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  Patient is with confusion, and no family is currently at bedside to provide further information regarding patient's clinical presentation.  Patient is confused, but is oriented to person and place.  Patient denies any gross complaints with the exception that he cannot breathe while on BiPAP.     In the ED, labs were obtained and as follows: Troponin <0.010, BNP 9541, glucose 108, CO2 33, WBC 6.3, hemoglobin 9.4, hematocrit 29.7.  ABG: pH 7.299, PCO2 75.1, PO2 67.0, HCO3 36.8, O2 saturation 89.6.  After ABG was obtained, patient was placed on BiPAP, in which a repeat ABG was obtained.  ABG: pH 7.290, PCO2 75.5, PaO2 112.7, HCO3 36.3, and O2 sat 97.5.  It was at this time, that patient was transitioned to [facility].  UA was obtained and negative.  Patient shows no evidence of infectious process at this time.  COVID-19 was checked and negative.     [facility] was contacted for admission to ICU and further evaluation and treatment.     INSPECT REPORT:  INSPECT report completed with most recent prescriptions of: Morphine sulfate extended release 15 mg tablet, 60 tablets for 30-day supply filled on 7/18/2020; oxycodone-acetaminophen  mg, 150 tablets for 30-day supply filled on 7/5/2020.       SUBJECTIVE    8/2: Confused and drowsy.  Cardene drip added.  Did not tolerate NIPPV last night.  Currently on 10 L high flow O2  8/3:  Remains on Cardene and a Precedex gtts. Confused.  5 liters of oxygen    OBJECTIVE    Vitals:    08/03/20 0550 08/03/20 0629 08/03/20 0633 08/03/20 0800   BP:       Pulse:  100 100    Resp:  20     Temp:    100.5 °F (38.1 °C)   TempSrc:     Oral   SpO2:  99% 99%    Weight: 55.8 kg (123 lb 0.3 oz)      Height:          Intake/Output last 3 shifts:  I/O last 3 completed shifts:  In: 3230 [I.V.:3230]  Out: 3650 [Urine:3650]  Intake/Output this shift:  No intake/output data recorded.    PHYSICAL EXAM:       Constitutional:  Chronically ill appearing  Eyes:  PERRL, conjunctiva normal, EOMI  HENT:  Atraumatic, external ears normal, nose normal. Neck- normal range of motion, no tenderness, supple, trachea midline  Respiratory:  Clear to diminished bilaterally, non-labored respirations without accessory muscle use  Cardiovascular:  RRR, no murmurs, no gallops, no rubs   GI:  Soft, nondistended, normal bowel sounds, nontender, no rebound or guarding  :   Deferred  Musculoskeletal:  No edema, no clubbing or cyanosis  Integument:  Well hydrated, no rash   Neurologic:   Awake and Confused.  No lateralizing deficits  Psychiatric:   Unable to evaluate    Scheduled Meds:    budesonide 0.5 mg Nebulization BID - RT   famotidine 20 mg Intravenous Daily   furosemide 40 mg Intravenous Q8H   heparin (porcine) 5,000 Units Subcutaneous Q8H   ipratropium-albuterol 3 mL Nebulization 4x Daily - RT   lamoTRIgine 100 mg Oral Nightly   lamoTRIgine 200 mg Oral Daily   losartan 100 mg Oral Daily   PARoxetine 40 mg Oral Daily   risperiDONE 0.5 mg Oral Nightly   roflumilast 500 mcg Oral Daily   sodium chloride 10 mL Intravenous Q12H       Continuous Infusions:    dexmedetomidine 0.2-1.5 mcg/kg/hr Last Rate: 1.2 mcg/kg/hr (08/03/20 0402)   niCARdipine 2.5-15 mg/hr Last Rate: 5 mg/hr (08/03/20 0402)       PRN Meds:•  acetaminophen **OR** acetaminophen  •  aluminum-magnesium hydroxide-simethicone  •  bisacodyl  •  hydrALAZINE  •  ipratropium-albuterol  •  magnesium hydroxide  •  magnesium sulfate **OR** magnesium sulfate in D5W 1g/100mL (PREMIX)  •  ondansetron **OR** ondansetron  •  potassium chloride **OR** potassium chloride **OR** potassium chloride  •  [COMPLETED] Insert  peripheral IV **AND** sodium chloride  •  sodium chloride     LABS    CBC  Results from last 7 days   Lab Units 08/03/20 0416 08/02/20  1901 08/02/20 0332 08/01/20 0343 07/31/20  2255   WBC 10*3/mm3 14.00*  --  15.60* 6.00 6.30   RBC 10*6/mm3 3.99*  --  4.03* 3.50* 3.27*   HEMOGLOBIN g/dL 11.3* 11.5* 11.5* 10.1* 9.4*   HEMATOCRIT % 34.9* 35.5* 35.2* 31.3* 29.7*   MCV fL 87.5  --  87.5 89.3 90.7   PLATELETS 10*3/mm3 344  --  365 273 245       CMP   Results from last 7 days   Lab Units 08/03/20 0416 08/02/20 1901 08/02/20 0332 08/01/20 0343 07/31/20  2255   SODIUM mmol/L 146*  --  142 139 140   POTASSIUM mmol/L 3.1* 3.2* 2.9* 3.7 4.3   CHLORIDE mmol/L 94*  --  87* 95* 98   CO2 mmol/L 38.0*  --  41.0* 37.0* 33.0*   BUN  35*  --  24* 19 20   CREATININE mg/dL 1.25  --  1.13 0.78 0.85   GLUCOSE mg/dL 134*  --  119* 104* 108*   ALBUMIN g/dL 3.80  --  4.20 3.90  --    BILIRUBIN mg/dL 0.8  --  0.9 0.4  --    ALK PHOS U/L 121*  --  133* 114  --    AST (SGOT) U/L 63*  --  38 20  --    ALT (SGPT) U/L 21  --  16 14  --        TROPONIN  Results from last 7 days   Lab Units 08/01/20 2002   TROPONIN T ng/mL <0.010       CoAg        ABG  Results from last 7 days   Lab Units 08/02/20  1257 08/02/20  0347 08/01/20  1043 08/01/20  0331 08/01/20  0008 07/31/20  2327   PH, ARTERIAL pH units 7.562* 7.576* 7.433 7.363 7.290* 7.299*   PCO2, ARTERIAL mm Hg 48.5* 51.1* 59.8* 70.1* 75.5* 75.1*   PO2 ART mm Hg 135.7* 81.2* 56.9* 59.8* 112.7* 67.0*   O2 SATURATION ART % 99.4* 97.2 88.9* 88.1* 97.5 89.6*   BASE EXCESS ART mmol/L 18.8* 22.3* 13.1* 12.0* 7.9* 8.5*       Microbiology  Microbiology Results (last 10 days)     Procedure Component Value - Date/Time    COVID-19, [facility]OTT IN-HOUSE,NP Swab (NO TRANSPORT MEDIA) 2 HR TAT - Swab, Nasopharynx [[id number]]  (Normal) Collected:  08/01/20 0014    Lab Status:  Final result Specimen:  Swab from Nasopharynx Updated:  08/01/20 0040     COVID19 Not Detected    Narrative:       Fact sheet for  providers: https://www.fda.gov/media/593343/download     Fact sheet for patients: https://www.fda.gov/media/708345/download          IMAGING & OTHER STUDIES    Imaging Results (Last 72 Hours)     Procedure Component Value Units Date/Time    CT Angiogram Chest With & Without Contrast [[id number]] Collected:  08/02/20 1044     Updated:  08/02/20 1054    Narrative:          DATE OF EXAM:  8/2/2020 9:12 AM     PROCEDURE:  CT ANGIOGRAM CHEST W WO CONTRAST-     INDICATIONS:   Aortic dz, non-traumatic, known or suspect; R06.00-Dyspnea, unspecified;  J44.1-Chronic obstructive pulmonary disease with (acute) exacerbation;  J96.21-Acute and chronic respiratory failure with hypoxia; J96.22-Acute  and chronic respiratory failure with hypercapnia; R41.82-Altered mental  status, unspecified     COMPARISON:   CT the chest with contrast PE protocol performed on 10/02/2017     TECHNIQUE:  Contiguous axial imaging was obtained from the thoracic inlet through  the upper abdomen following the intravenous administration of 100 mL  Isovue 370. Reconstructed coronal and sagittal images were also  obtained. In addition, a 3 D volume rendered image was obtained after  post processing. Automated exposure control and iterative reconstruction  methods were used.     FINDINGS:  VASCULAR FINDINGS: The present study reveals that there is an abnormal  dissection and aneurysm of the descending thoracic aorta. The dissection  starts just above the aortic valve and is proximal to the origin of the  brachiocephalic artery. The dissection measures 7.2 cm in length the  distal end of the dissection lies 4.5 cm proximal to the origin of the  brachycephalic artery. The descending thoracic aorta measures 5.2 cm in  diameter by 5.7 cm in diameter. Calcified atherosclerotic plaque can be  seen in the left coronary artery branches and in the right coronary  artery. The dissection does not appear to involve the right or left  coronary artery origins. The heart  size normal. No evidence of  pericardial effusion. No evidence of hiatal hernia. No evidence of blood  in the mediastinum. No evidence of mass or adenopathy in the mediastinum  or in the right or left pulmonary tito. The patient is status post  sternotomy. No evidence of mass or adenopathy in the base the neck or in  the periclavicular soft tissues or the axillary soft tissues. The  patient is status post cholecystectomy. There are surgical clips in the  gallbladder fossa. There is mild dilatation of the intrahepatic biliary  ducts and moderate dilatation of the common hepatic duct and common bile  duct. This is probably chronic and most likely acute. Correlation with  liver enzymes BE helpful. There is stent graft repair of abdominal  aortic aneurysm. No evidence of mass or adenopathy or ascites in the  abdomen. There are bilateral diffuse increased lung markings especially  in the lower lobes consistent with chronic lung disease. There are small  very small focal infiltrates in the lung bases bilaterally that might be  caused by inflammation or infection or scarring. No evidence of pleural  effusion or pneumothorax or mass in the right or left lungs.                Impression:          1. Dissection of the descending thoracic aorta that starts just above  the aortic valve and its distal and lies proximal to the origin of the  brachycephalic artery.  2. Aneurysmal dilatation of the ascending thoracic aorta  3. Very small focal infiltrates in the posterior lung bases bilaterally  might be caused by inflammation or infection.  4. Chronic lung disease.  5. Status post cholecystectomy. Dilatation of bile ducts might be acute  or chronic. Correlation with liver enzymes would BE helpful.     Electronically Signed By-DR. Keyon Laguerre MD On:8/2/2020 10:52  AM  This report was finalized on [id number] by DR. Keyon Laguerre MD.    CT Head Without Contrast [[id number]] Collected:  08/01/20 1818     Updated:   08/01/20 1821    Narrative:       Examination: CT HEAD WO CONTRAST-     Date of Exam: 8/1/2020 5:55 PM     Indication: Encephalopathy.     Comparison: None available.     Technique: Axial noncontrast CT imaging of the head was performed.  Automated exposure control and iterative reconstruction methods were  used.     Findings:  Superficial soft tissues appear within normal limits. The calvarium is  intact.  Paranasal sinuses and mastoid air cells appear well aerated.   Orbits are unremarkable.  There is no acute intracranial hemorrhage.  No  mass effect or midline shift.  No abnormal extra-axial collections.   Gray-white differentiation is within normal limits.  There is extensive  periventricular white matter hypoattenuation. This appears unchanged  from the prior study. Ventricular size and configuration is normal for  age.          Impression:          1. No acute intracranial abnormality.  2. Stable advanced chronic small vessel ischemic change.     Electronically Signed By-Rubén Little On:8/1/2020 6:19 PM  This report was finalized on [id number] by  Rubén Little, .    CT Chest Without Contrast [[id number]] Collected:  08/01/20 1810     Updated:  08/01/20 1818    Narrative:       Examination: CT CHEST WO CONTRAST-     Date of Exam: 8/1/2020 5:55 PM     Indication: Respiratory failure, not elsewhere classified;  R06.00-Dyspnea, unspecified; J44.1-Chronic obstructive pulmonary disease  with (acute) exacerbation; J96.21-Acute and chronic respiratory failure  with hypoxia; J96.22-Acute and chronic respiratory failure with  hypercapnia; R41.82-Altered mental status, unspecified.     Comparison: 10/02/2017.     Technique: Non-contrast axial volumetric CT imaging of the chest was  performed. Automated exposure control and iterative reconstruction  methods were used.     Findings:  There appears to be possible development of a crescentic periaortic  isodense collection, which would raise concern for periaortic  hematoma,  intimal hematoma or dissection. There is evidence of prior median  sternotomy and CABG. There is extensive native coronary artery  calcification and prominent aortic and mitral annular calcification.  There is no pericardial effusion. The trachea and esophagus appear  within normal limits. The thyroid is normal.     There is no pneumothorax, pleural effusion or focal airspace  consolidation. There is bibasilar subsegmental atelectasis versus  scarring. There is some debris within the right lower lobe bronchus.  There is mild diffuse peribronchial thickening. There are no discrete  measurable concerning lung nodules.     Subcutaneous fat and underlying musculature appear within normal limits.  There is a chronic compression fracture at T12 status post kyphoplasty.  There is a moderate anterior wedging compression fracture at T8 with  approximately 50% loss of height anteriorly. This is age-indeterminate,  but appears new since 2017. There are moderate lower cervical and mild  multilevel thoracic degenerative changes. Limited images of the upper  abdomen demonstrate no acute findings. There is a partially visualized  stent graft repair of infrarenal abdominal aortic aneurysm.       Impression:          1. There is concern for a new crescentic periaortic collection at the  ascending aorta, which raises concern for aortic wall hematoma,  dissection or aneurysm. Recommend contrast-enhanced study of the chest  for further evaluation.  2. Severe coronary artery disease status post CABG.  3. There is an age-indeterminate compression fracture with 50% loss of  height at T8. This is new when compared with 2017.  4. Cervical and thoracic degenerative changes.     Electronically Signed By-Rubén Little On:8/1/2020 6:16 PM  This report was finalized on [id number] by  Rubén Little, .    XR Chest 1 View [[id number]] Collected:  08/01/20 0813     Updated:  08/01/20 0821    Narrative:       Examination: XR CHEST 1  VW-     Date of Exam: 7/31/2020 11:05 PM     Indication: Dyspnea.     Comparison: 03/15/2020.     Technique: Single radiographic view of the chest was obtained.     Findings:  There are chronic interstitial opacities in the lungs primarily in the  bases. There is scarring in the right lung base with elevated right  hemidiaphragm. The cardiac silhouette appears enlarged. No acute osseous  abnormalities. No pneumothorax, pleural effusion or new lung  consolidation. There is evidence of prior median sternotomy and CABG.       Impression:       1.Chronic interstitial lung disease without superimposed acute process  of the chest.  2.2. Cardiomegaly and stable postoperative findings.     Electronically Signed By-Rubén Little On:8/1/2020 8:19 AM  This report was finalized on [id number] by  Rubén Little, .        Results for orders placed during the hospital encounter of 03/15/20   Adult Transthoracic Echo Complete W/ Cont if Necessary Per Protocol    Narrative · The following left ventricular wall segments are hypokinetic: mid   anterior, apical anterior, basal anterolateral, mid anterolateral, apical   lateral, basal inferolateral, mid inferolateral, apical inferior, mid   inferior, apical septal, basal inferoseptal, mid inferoseptal, apex   hypokinetic, mid anteroseptal, basal anterior, basal inferior and basal   inferoseptal.  · Left ventricular systolic function is moderately decreased.  · Right ventricular cavity is borderline dilated.  · Left atrial cavity size is borderline dilated.  · Mild to moderate aortic valve regurgitation is present.             Acute respiratory failure with hypercapnia and hypoxia  -Likely multifactorial, with CHF/COPD exacerbation  -ABG and CXR-reviewed  -Oxygen supplementation as needed, titrate FiO2 to maintain patient's oxygen saturations greater than 91%  -Currently on O2 5 L high flow     Acute COPD exacerbation  -COVID-19 negative  -No evidence of infectious process, will hold  on antibiotics at this time  -CXR reviewed.  -No indication for IV/p.o. steroids at this time  -Continue Pulmicort twice daily, DuoNeb scheduled and as needed, Daliresp  -Recommend follow-up in outpatient pulmonary clinic     Acute exacerbation of heart failure with reduced ejection fraction  -BNP 9541 on admission  -Continue scheduled diuresis, decrease.  Monitor creatinine, currently 1.25  -Reviewed previous Echo, EF 44%    Dissection of the descending thoracic aorta  -CV surgery consulted, evaluated CT and CTA with the opinion that the dissection is isolated to the ascending aorta.  The flap looks a little bit thick possibly representing a chronic dissection.  The patient is a poor surgical candidate which was discussed by Dr. Isaacs with the patient's wife and surgery is not recommended.  Comanagement with strict blood pressure control.    -Cardene drip      Hypertensive urgency, history of essential hypertension  -Continue Losartan  -IV Lopressor x 1 dose given, no further BB 2/2 SB  -Cardene gtt ordered, titrate for SBP < 120     Sinus bradycardia, resolved  -Hold amiodarone, metoprolol XL  -No evidence of chronic anticoagulation  -EKG reviewed     CAD, S/P CABG  -Troponin <0.010, monitor serial troponins, remained flat  -Lipid profile reviewed     Confusion/Altered mental status; H/O memory loss 2/2 TBI  -Only 1 SIRS criteria, normal WBC, afebrile  -UA negative  -UDS reviewed, positive for opiates  -on Precedex gtt      Bipolar affective disorder  -Continue Lamictal, Paxil, Seroquel  -Risperdal added  -bedside swallow eval, Speech eval if needed.  If fails will place a feeding tube    Chronic pain  -resume pain medication   -monitor for withdrawal symptoms    Replace electrolytes as needed      Accuchecks with SSI  Code Status: CPR, Full Interventions  VTE Prophylaxis: Heparin/SCDs  PUD Prophylaxis: Pepcid D/C, could be contributing to delirium     D/C emery cath

## 2020-08-03 NOTE — PROGRESS NOTES
Discharge Planning Assessment  [facility]     Patient Name: Adolfo Alejandro  MRN: [id number]  Today's Date: 8/3/2020    Admit Date: 7/31/2020    Discharge Needs Assessment     Row Name 08/03/20 1429       Living Environment    Lives With  spouse    Current Living Arrangements  home/apartment/condo    Primary Care Provided by  spouse/significant other;self    Family Caregiver if Needed  spouse;child(julio), adult son lives with    Able to Return to Prior Arrangements  -- unsure at this        Resource/Environmental Concerns    Resource/Environmental Concerns  none    Transportation Concerns  car, none       Transition Planning    Patient/Family Anticipates Transition to  home    Patient/Family Anticipated Services at Transition  none    Transportation Anticipated  family or friend will provide       Discharge Needs Assessment    Readmission Within the Last 30 Days  no previous admission in last 30 days    Concerns to be Addressed  no discharge needs identified    Equipment Currently Used at Home  cane, straight;oxygen;respiratory supplies;nebulizer;walker, rolling [employer]    Anticipated Changes Related to Illness  none    Equipment Needed After Discharge  -- unknown at this time    Discharge Coordination/Progress  Pending PT/OT evals and clinical course.     Row Name 08/03/20 1428       Living Environment    Lives With  spouse    Current Living Arrangements  home/apartment/condo        Discharge Plan     Row Name 08/03/20 1443       Plan    Plan  Pending PT/OT evals and clinical course.     Patient/Family in Agreement with Plan  yes    Plan Comments  Spoke with patient's wife via telephone due to covid 19 pandemic. Verified PCP and pharmacy. Patient has been requiring more assistance at home. According to the spouse, patient has quite a lengthy history involving quite a lot of rehab. Patient has also had [facility] in the past. Patient has been to [facility] and [place] in the past. She was not quite ready to talk about  choices at this time. She is willing to discuss this after PT/OT evals are completed. CM verified patient's current DME at home.  Barriers to discharge: Patient currently on 5 L NC, WBC 15.6 and BNP of 9541.          Expected Discharge Date and Time     Expected Discharge Date Expected Discharge Time    Aug 8, 2020         Demographic Summary     Row Name 08/03/20 1427       General Information    Admission Type  inpatient    Arrived From  emergency department    Required Notices Provided  Important Message from Medicare    Referral Source  admission list;physician    Reason for Consult  discharge planning    Preferred Language  English     Used During This Interaction  no       Contact Information    Permission Granted to Share Info With            Patient Forms     Row Name 08/03/20 1448       Patient Forms    Important Message from Medicare (IMM)  -- IM given 8/2.              Anna Nae[place] [address]   [phone]  office  [phone]  fax  [email or web address]  [email or web address]

## 2020-08-04 NOTE — PROGRESS NOTES
[facility]/PULM/CC PROGRESS NOTE       HISTORY OF PRESENT ILLNESS:  Adolfo Alejandro is a 75 y.o. 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  Patient is with confusion, and no family is currently at bedside to provide further information regarding patient's clinical presentation.  Patient is confused, but is oriented to person and place.  Patient denies any gross complaints with the exception that he cannot breathe while on BiPAP.     In the ED, labs were obtained and as follows: Troponin <0.010, BNP 9541, glucose 108, CO2 33, WBC 6.3, hemoglobin 9.4, hematocrit 29.7.  ABG: pH 7.299, PCO2 75.1, PO2 67.0, HCO3 36.8, O2 saturation 89.6.  After ABG was obtained, patient was placed on BiPAP, in which a repeat ABG was obtained.  ABG: pH 7.290, PCO2 75.5, PaO2 112.7, HCO3 36.3, and O2 sat 97.5.  It was at this time, that patient was transitioned to [facility].  UA was obtained and negative.  Patient shows no evidence of infectious process at this time.  COVID-19 was checked and negative.     [facility] was contacted for admission to ICU and further evaluation and treatment.     INSPECT REPORT:  INSPECT report completed with most recent prescriptions of: Morphine sulfate extended release 15 mg tablet, 60 tablets for 30-day supply filled on 7/18/2020; oxycodone-acetaminophen  mg, 150 tablets for 30-day supply filled on 7/5/2020.       SUBJECTIVE    8/2: Confused and drowsy.  Cardene drip added.  Did not tolerate NIPPV last night.  Currently on 10 L high flow O2  8/3:  Remains on Cardene and a Precedex gtts. Confused.  5 liters of oxygen  8/4:  Off Cardene and Precedex gtts.  Remains confused and in restraints.  On 10 liters high flow nasal cannula.     OBJECTIVE    Vitals:    08/04/20 0600 08/04/20 0700  08/04/20 0745 08/04/20 0807   BP:   166/69    Pulse:   95    Resp:       Temp:    97.5 °F (36.4 °C)   TempSrc:    Axillary   SpO2:  92%     Weight: 53.6 kg (118 lb 2.7 oz)      Height:          Intake/Output last 3 shifts:  I/O last 3 completed shifts:  In: 2630 [P.O.:120; I.V.:2510]  Out: 1600 [Urine:1600]  Intake/Output this shift:  No intake/output data recorded.    PHYSICAL EXAM:       Constitutional:  Chronically ill appearing  Eyes:  PERRL, conjunctiva normal, EOMI  HENT:  Atraumatic, external ears normal, nose normal. Neck- normal range of motion, no tenderness, supple, trachea midline  Respiratory:  Clear to diminished bilaterally, non-labored respirations without accessory muscle use  Cardiovascular:  RRR, no murmurs, no gallops, no rubs   GI:  Soft, nondistended, normal bowel sounds, nontender, no rebound or guarding  :   Deferred  Musculoskeletal:  No edema, no clubbing or cyanosis  Integument:  Well hydrated, no rash   Neurologic:   Awake and Confused.  No lateralizing deficits  Psychiatric:   Unable to evaluate    Scheduled Meds:    amiodarone 100 mg Oral Daily   budesonide 0.5 mg Nebulization BID - RT   furosemide 40 mg Intravenous Daily   heparin (porcine) 5,000 Units Subcutaneous Q8H   ipratropium-albuterol 3 mL Nebulization 4x Daily - RT   lamoTRIgine 100 mg Oral Nightly   lamoTRIgine 200 mg Oral Daily   losartan 100 mg Oral Daily   metoprolol tartrate 50 mg Oral Q12H   PARoxetine 40 mg Oral Daily   QUEtiapine 50 mg Oral Nightly   sodium chloride 10 mL Intravenous Q12H       Continuous Infusions:    dexmedetomidine 0.2-1.5 mcg/kg/hr Last Rate: Stopped (08/03/20 2049)   niCARdipine 2.5-15 mg/hr Last Rate: Stopped (08/03/20 2015)       PRN Meds:•  acetaminophen **OR** acetaminophen  •  aluminum-magnesium hydroxide-simethicone  •  bisacodyl  •  hydrALAZINE  •  ipratropium-albuterol  •  magnesium hydroxide  •  magnesium sulfate **OR** magnesium sulfate in D5W 1g/100mL (PREMIX)  •  morphine sulfate  (concentrate)  •  ondansetron **OR** ondansetron  •  potassium chloride **OR** potassium chloride **OR** potassium chloride  •  [COMPLETED] Insert peripheral IV **AND** sodium chloride  •  sodium chloride     LABS    CBC  Results from last 7 days   Lab Units 08/04/20  0508 08/03/20  0416 08/02/20  1901 08/02/20  0332 08/01/20 0343 07/31/20  2255   WBC 10*3/mm3 13.20* 14.00*  --  15.60* 6.00 6.30   RBC 10*6/mm3 3.88* 3.99*  --  4.03* 3.50* 3.27*   HEMOGLOBIN g/dL 11.0* 11.3* 11.5* 11.5* 10.1* 9.4*   HEMATOCRIT % 34.5* 34.9* 35.5* 35.2* 31.3* 29.7*   MCV fL 88.8 87.5  --  87.5 89.3 90.7   PLATELETS 10*3/mm3 321 344  --  365 273 245       CMP   Results from last 7 days   Lab Units 08/04/20  0508 08/03/20  1715 08/03/20  0416 08/02/20  1901 08/02/20 0332 08/01/20  0343 07/31/20  2255   SODIUM mmol/L 149*  --  146*  --  142 139 140   POTASSIUM mmol/L 3.7 3.3* 3.1* 3.2* 2.9* 3.7 4.3   CHLORIDE mmol/L 103  --  94*  --  87* 95* 98   CO2 mmol/L 34.0*  --  38.0*  --  41.0* 37.0* 33.0*   BUN  39*  --  35*  --  24* 19 20   CREATININE mg/dL 1.30*  --  1.25  --  1.13 0.78 0.85   GLUCOSE mg/dL 101*  --  134*  --  119* 104* 108*   ALBUMIN g/dL 3.40*  --  3.80  --  4.20 3.90  --    BILIRUBIN mg/dL 0.6  --  0.8  --  0.9 0.4  --    ALK PHOS U/L 113  --  121*  --  133* 114  --    AST (SGOT) U/L 109*  --  63*  --  38 20  --    ALT (SGPT) U/L 38  --  21  --  16 14  --        TROPONIN  Results from last 7 days   Lab Units 08/01/20 2002   TROPONIN T ng/mL <0.010       CoAg        ABG  Results from last 7 days   Lab Units 08/02/20  1257 08/02/20  0347 08/01/20  1043 08/01/20  0331 08/01/20  0008 07/31/20  2327   PH, ARTERIAL pH units 7.562* 7.576* 7.433 7.363 7.290* 7.299*   PCO2, ARTERIAL mm Hg 48.5* 51.1* 59.8* 70.1* 75.5* 75.1*   PO2 ART mm Hg 135.7* 81.2* 56.9* 59.8* 112.7* 67.0*   O2 SATURATION ART % 99.4* 97.2 88.9* 88.1* 97.5 89.6*   BASE EXCESS ART mmol/L 18.8* 22.3* 13.1* 12.0* 7.9* 8.5*       Microbiology  Microbiology Results  (last 10 days)     Procedure Component Value - Date/Time    COVID-19, [facility] IN-HOUSE,NP Swab (NO TRANSPORT MEDIA) 2 HR TAT - Swab, Nasopharynx [[id number]]  (Normal) Collected:  08/01/20 0014    Lab Status:  Final result Specimen:  Swab from Nasopharynx Updated:  08/01/20 0040     COVID19 Not Detected    Narrative:       Fact sheet for providers: https://www.fda.gov/media/518459/download     Fact sheet for patients: https://www.fda.gov/media/347063/download          IMAGING & OTHER STUDIES    Imaging Results (Last 72 Hours)     Procedure Component Value Units Date/Time    XR Chest 1 View [[id number]] Resulted:  08/04/20 0842     Updated:  08/04/20 0843    CT Angiogram Chest With & Without Contrast [[id number]] Collected:  08/02/20 1044     Updated:  08/02/20 1054    Narrative:          DATE OF EXAM:  8/2/2020 9:12 AM     PROCEDURE:  CT ANGIOGRAM CHEST W WO CONTRAST-     INDICATIONS:   Aortic dz, non-traumatic, known or suspect; R06.00-Dyspnea, unspecified;  J44.1-Chronic obstructive pulmonary disease with (acute) exacerbation;  J96.21-Acute and chronic respiratory failure with hypoxia; J96.22-Acute  and chronic respiratory failure with hypercapnia; R41.82-Altered mental  status, unspecified     COMPARISON:   CT the chest with contrast PE protocol performed on 10/02/2017     TECHNIQUE:  Contiguous axial imaging was obtained from the thoracic inlet through  the upper abdomen following the intravenous administration of 100 mL  Isovue 370. Reconstructed coronal and sagittal images were also  obtained. In addition, a 3 D volume rendered image was obtained after  post processing. Automated exposure control and iterative reconstruction  methods were used.     FINDINGS:  VASCULAR FINDINGS: The present study reveals that there is an abnormal  dissection and aneurysm of the descending thoracic aorta. The dissection  starts just above the aortic valve and is proximal to the origin of the  brachiocephalic artery. The dissection  measures 7.2 cm in length the  distal end of the dissection lies 4.5 cm proximal to the origin of the  brachycephalic artery. The descending thoracic aorta measures 5.2 cm in  diameter by 5.7 cm in diameter. Calcified atherosclerotic plaque can be  seen in the left coronary artery branches and in the right coronary  artery. The dissection does not appear to involve the right or left  coronary artery origins. The heart size normal. No evidence of  pericardial effusion. No evidence of hiatal hernia. No evidence of blood  in the mediastinum. No evidence of mass or adenopathy in the mediastinum  or in the right or left pulmonary tito. The patient is status post  sternotomy. No evidence of mass or adenopathy in the base the neck or in  the periclavicular soft tissues or the axillary soft tissues. The  patient is status post cholecystectomy. There are surgical clips in the  gallbladder fossa. There is mild dilatation of the intrahepatic biliary  ducts and moderate dilatation of the common hepatic duct and common bile  duct. This is probably chronic and most likely acute. Correlation with  liver enzymes BE helpful. There is stent graft repair of abdominal  aortic aneurysm. No evidence of mass or adenopathy or ascites in the  abdomen. There are bilateral diffuse increased lung markings especially  in the lower lobes consistent with chronic lung disease. There are small  very small focal infiltrates in the lung bases bilaterally that might be  caused by inflammation or infection or scarring. No evidence of pleural  effusion or pneumothorax or mass in the right or left lungs.                Impression:          1. Dissection of the descending thoracic aorta that starts just above  the aortic valve and its distal and lies proximal to the origin of the  brachycephalic artery.  2. Aneurysmal dilatation of the ascending thoracic aorta  3. Very small focal infiltrates in the posterior lung bases bilaterally  might be caused by  inflammation or infection.  4. Chronic lung disease.  5. Status post cholecystectomy. Dilatation of bile ducts might be acute  or chronic. Correlation with liver enzymes would BE helpful.     Electronically Signed By-DR. Keyon Laguerre MD On:8/2/2020 10:52  AM  This report was finalized on [id number] by DR. Keyon Laguerre MD.    CT Head Without Contrast [[id number]] Collected:  08/01/20 1818     Updated:  08/01/20 1821    Narrative:       Examination: CT HEAD WO CONTRAST-     Date of Exam: 8/1/2020 5:55 PM     Indication: Encephalopathy.     Comparison: None available.     Technique: Axial noncontrast CT imaging of the head was performed.  Automated exposure control and iterative reconstruction methods were  used.     Findings:  Superficial soft tissues appear within normal limits. The calvarium is  intact.  Paranasal sinuses and mastoid air cells appear well aerated.   Orbits are unremarkable.  There is no acute intracranial hemorrhage.  No  mass effect or midline shift.  No abnormal extra-axial collections.   Gray-white differentiation is within normal limits.  There is extensive  periventricular white matter hypoattenuation. This appears unchanged  from the prior study. Ventricular size and configuration is normal for  age.          Impression:          1. No acute intracranial abnormality.  2. Stable advanced chronic small vessel ischemic change.     Electronically Signed By-Rubén Little On:8/1/2020 6:19 PM  This report was finalized on [id number] by  Rubén Little, .    CT Chest Without Contrast [[id number]] Collected:  08/01/20 1810     Updated:  08/01/20 1818    Narrative:       Examination: CT CHEST WO CONTRAST-     Date of Exam: 8/1/2020 5:55 PM     Indication: Respiratory failure, not elsewhere classified;  R06.00-Dyspnea, unspecified; J44.1-Chronic obstructive pulmonary disease  with (acute) exacerbation; J96.21-Acute and chronic respiratory failure  with hypoxia; J96.22-Acute and  chronic respiratory failure with  hypercapnia; R41.82-Altered mental status, unspecified.     Comparison: 10/02/2017.     Technique: Non-contrast axial volumetric CT imaging of the chest was  performed. Automated exposure control and iterative reconstruction  methods were used.     Findings:  There appears to be possible development of a crescentic periaortic  isodense collection, which would raise concern for periaortic hematoma,  intimal hematoma or dissection. There is evidence of prior median  sternotomy and CABG. There is extensive native coronary artery  calcification and prominent aortic and mitral annular calcification.  There is no pericardial effusion. The trachea and esophagus appear  within normal limits. The thyroid is normal.     There is no pneumothorax, pleural effusion or focal airspace  consolidation. There is bibasilar subsegmental atelectasis versus  scarring. There is some debris within the right lower lobe bronchus.  There is mild diffuse peribronchial thickening. There are no discrete  measurable concerning lung nodules.     Subcutaneous fat and underlying musculature appear within normal limits.  There is a chronic compression fracture at T12 status post kyphoplasty.  There is a moderate anterior wedging compression fracture at T8 with  approximately 50% loss of height anteriorly. This is age-indeterminate,  but appears new since 2017. There are moderate lower cervical and mild  multilevel thoracic degenerative changes. Limited images of the upper  abdomen demonstrate no acute findings. There is a partially visualized  stent graft repair of infrarenal abdominal aortic aneurysm.       Impression:          1. There is concern for a new crescentic periaortic collection at the  ascending aorta, which raises concern for aortic wall hematoma,  dissection or aneurysm. Recommend contrast-enhanced study of the chest  for further evaluation.  2. Severe coronary artery disease status post CABG.  3.  There is an age-indeterminate compression fracture with 50% loss of  height at T8. This is new when compared with 2017.  4. Cervical and thoracic degenerative changes.     Electronically Signed By-Rubén Little On:8/1/2020 6:16 PM  This report was finalized on [id number] by  Rubén Little, .        Results for orders placed during the hospital encounter of 03/15/20   Adult Transthoracic Echo Complete W/ Cont if Necessary Per Protocol    Narrative · The following left ventricular wall segments are hypokinetic: mid   anterior, apical anterior, basal anterolateral, mid anterolateral, apical   lateral, basal inferolateral, mid inferolateral, apical inferior, mid   inferior, apical septal, basal inferoseptal, mid inferoseptal, apex   hypokinetic, mid anteroseptal, basal anterior, basal inferior and basal   inferoseptal.  · Left ventricular systolic function is moderately decreased.  · Right ventricular cavity is borderline dilated.  · Left atrial cavity size is borderline dilated.  · Mild to moderate aortic valve regurgitation is present.             Acute respiratory failure with hypercapnia and hypoxia  -Likely multifactorial, with CHF/COPD exacerbation  -ABG and CXRs-reviewed  -Oxygen supplementation as needed, titrate FiO2 to maintain patient's oxygen saturations greater than 91%  -Currently on O2 10 L high flow     Acute COPD exacerbation  -COVID-19 negative  -CXRs reviewed  -No indication for IV/p.o. steroids at this time  -Continue Pulmicort twice daily, DuoNeb scheduled and as needed, Daliresp  -Recommend follow-up in outpatient pulmonary clinic     Acute exacerbation of heart failure with reduced ejection fraction  -BNP 9541 on admission  -Continue scheduled diuresis, decreased to daily.  Monitor creatinine, currently 1.3  -Reviewed previous Echo, EF 44%    Dissection of the descending thoracic aorta  -CV surgery consulted, evaluated CT and CTA with the opinion that the dissection is isolated to the  ascending aorta.  The flap looks a little bit thick possibly representing a chronic dissection.  The patient is a poor surgical candidate which was discussed by Dr. Isaacs with the patient's wife and surgery is not recommended.  Comanagement with strict blood pressure control.    -Cardene drip, now off.  Home meds resumed      Hypertensive urgency, history of essential hypertension  -Hold Losartan due to DEE.  Continue metoprolol.  Add Norvasc  -IV Lopressor x 1 dose given, no further BB 2/2 SB  -Cardene gtt ordered, titrate for SBP < 120, OFF.  Home meds resumed.  Prn anti-hypertensive IV     Sinus bradycardia, resolved  -Hold amiodarone, metoprolol XL  -No evidence of chronic anticoagulation  -EKG reviewed     CAD, S/P CABG  -Troponin <0.010, monitor serial troponins, remained flat  -Lipid profile reviewed     Confusion/Altered mental status; H/O memory loss 2/2 TBI  -Only 1 SIRS criteria, normal WBC, afebrile  -UA negative  -UDS reviewed, positive for opiates  -OFF Precedex gtt     Hypernatremia   -     Bipolar affective disorder  -Continue Lamictal, Paxil, Seroquel  -Risperdal added  -bedside swallow eval, passed     Chronic pain  -resume home pain medication   -monitor for withdrawal symptoms    Replace electrolytes as needed      Accuchecks with SSI  Code Status: CPR, Full Interventions  VTE Prophylaxis: Heparin/SCDs  PUD Prophylaxis: Pepcid D/C, could be contributing to delirium     D/C emery cath   Diet ordered

## 2020-08-04 NOTE — PLAN OF CARE
Problem: Patient Care Overview  Goal: Plan of Care Review  Outcome: Ongoing (interventions implemented as appropriate)  Flowsheets (Taken 8/4/2020 2282)  Progress: improving  Outcome Summary: Pt remains in restraints x4 and sitter at bedside. Tolerate meds PO. Cardene drip is off since  2100.Precedex have been off most of the evening. since 2130. pt remainson 8L HF. confused. will continue to monitor  Goal: Individualization and Mutuality  Outcome: Ongoing (interventions implemented as appropriate)  Goal: Discharge Needs Assessment  Outcome: Ongoing (interventions implemented as appropriate)  Goal: Interprofessional Rounds/Family Conf  Outcome: Ongoing (interventions implemented as appropriate)     Problem: Fall Risk (Adult)  Goal: Identify Related Risk Factors and Signs and Symptoms  Outcome: Ongoing (interventions implemented as appropriate)  Goal: Absence of Fall  Outcome: Ongoing (interventions implemented as appropriate)     Problem: Restraint, Nonbehavioral (Nonviolent)  Goal: Rationale and Justification  Outcome: Ongoing (interventions implemented as appropriate)  Goal: Nonbehavioral (Nonviolent) Restraint: Absence of Injury/Harm  Outcome: Ongoing (interventions implemented as appropriate)  Goal: Nonbehavioral (Nonviolent) Restraint: Achievement of Discontinuation Criteria  Outcome: Ongoing (interventions implemented as appropriate)  Goal: Nonbehavioral (Nonviolent) Restraint: Preservation of Dignity and Wellbeing  Outcome: Ongoing (interventions implemented as appropriate)     Problem: Skin Injury Risk (Adult)  Goal: Identify Related Risk Factors and Signs and Symptoms  Outcome: Ongoing (interventions implemented as appropriate)  Goal: Skin Health and Integrity  Outcome: Ongoing (interventions implemented as appropriate)

## 2020-08-05 NOTE — THERAPY EVALUATION
Patient Name: Adolfo Alejandro  : 1945    MRN: [id number]                              Today's Date: 2020       Admit Date: 2020    Visit Dx:     ICD-10-CM ICD-9-CM   1. Dyspnea, unspecified type R06.00 786.09   2. COPD exacerbation (CMS/[facility]) J44.1 491.21   3. Acute on chronic respiratory failure with hypoxia and hypercapnia (CMS/[facility]) J96.21 518.84    J96.22 786.09     799.02   4. Altered mental status, unspecified altered mental status type R41.82 780.97     Patient Active Problem List   Diagnosis   • Abdominal aortic aneurysm (AAA) (CMS/[facility])   • Alcoholism (CMS/[facility])   • Arthralgia of right acromioclavicular joint   • Atherosclerotic heart disease of native coronary artery without angina pectoris   • Atrial fibrillation (CMS/[facility])   • Bilateral impacted cerumen   • Bipolar affective disorder (CMS/[facility])   • Cellulitis of right buttock   • Chronic back pain   • Pulmonary emphysema (CMS/[facility])   • Chronic obstructive pulmonary disease with acute exacerbation (CMS/[facility])   • Gastroesophageal reflux disease   • Generalized anxiety disorder   • Hip pain, right   • Hospital-acquired pneumonia   • Hx of traumatic brain injury   • Hyperlipidemia   • Hypertension   • Insomnia   • Kidney stone   • Nicotine dependence   • Osteoarthritis of hip   • Synovial cyst   • Other shoulder lesions, left shoulder   • Other specified dorsopathies, site unspecified   • Pneumonia   • Pain in shoulder   • Polyosteoarthritis   • Sprain of ligaments of lumbar spine   • Status post coronary artery bypass graft   • Status post hip replacement   • Subarachnoid hemorrhage (CMS/[facility])   • Subdural hematoma (CMS/[facility])   • COPD exacerbation (CMS/[facility])   • Chronic respiratory failure with hypercapnia (CMS/[facility])   • Dyspnea   • Dissection of thoracic aorta (CMS/[facility])     Past Medical History:   Diagnosis Date   • Anxiety    • Arthritis    • Bipolar affective disorder (CMS/[facility])    • COPD (chronic obstructive pulmonary disease) (CMS/[facility])    • GERD  (gastroesophageal reflux disease)    • Hypertension    • 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   • Memory loss, short term    • Sepsis (CMS/HCC)      Past Surgical History:   Procedure Laterality Date   • ABDOMINAL AORTIC ANEURYSM REPAIR     • CHOLECYSTECTOMY     • CORONARY ANGIOPLASTY     • CORONARY ARTERY BYPASS GRAFT  03/13/2014   • THORACOTOMY Right    • TOTAL HIP ARTHROPLASTY Left    • TOTAL HIP ARTHROPLASTY Right 02/03/2014     General Information     Row Name 08/05/20 1040          PT Evaluation Time/Intention    Document Type  evaluation  -CR     Mode of Treatment  physical therapy;co-treatment;occupational therapy  -CR     Row Name 08/05/20 1040          General Information    Patient Profile Reviewed?  yes  -CR     Prior Level of Function  independent:;all household mobility using cane from previous admit  -CR     Row Name 08/05/20 1040          Home Main Entrance    Number of Stairs, Main Entrance  none  -CR     Row Name 08/05/20 1040          Stairs Within Home, Primary    Number of Stairs, Within Home, Primary  none  -CR     Row Name 08/05/20 1040          Cognitive Assessment/Intervention- PT/OT    Orientation Status (Cognition)  unable/difficult to assess  -CR     Cognitive Assessment/Intervention Comment  not waking up enough to answer any questions  -CR     Row Name 08/05/20 1040          Safety Issues, Functional Mobility    Safety Issues Affecting Function (Mobility)  ability to follow commands;insight into deficits/self awareness;safety precautions follow-through/compliance;sequencing abilities  -CR     Impairments Affecting Function (Mobility)  balance;cognition;coordination  -CR       User Key  (r) = Recorded By, (t) = Taken By, (c) = Cosigned By    Initials Name Provider Type    CR Reyes, Carmela, PT Physical Therapist        Mobility     Row Name 08/05/20 1042          Bed Mobility Assessment/Treatment    Bed Mobility Assessment/Treatment  bed mobility  (all) activities  -CR     Cassia Level (Bed Mobility)  dependent (less than 25% patient effort);2 person assist  -CR     Assistive Device (Bed Mobility)  draw sheet  -CR     Row Name 08/05/20 1042          Transfer Assessment/Treatment    Comment (Transfers)  not able to sit EOB unassisted at this time,standing not apprpriate  -CR       User Key  (r) = Recorded By, (t) = Taken By, (c) = Cosigned By    Initials Name Provider Type    CR Reyes, Carmela, PT Physical Therapist        Obj/Interventions     Row Name 08/05/20 1042          General ROM    GENERAL ROM COMMENTS  PROM BLE wfl   -CR     Row Name 08/05/20 1042          MMT (Manual Muscle Testing)    General MMT Comments  does not follow commands to assess accurately  -CR     Row Name 08/05/20 1042          Therapeutic Exercise    Exercise Type (Therapeutic Exercise)  PROM (passive range of motion)  -CR     Position (Therapeutic Exercise)  supine  -CR     Sets/Reps (Therapeutic Exercise)  1/5 to avail range  -CR     Row Name 08/05/20 1042          Static Sitting Balance    Level of Cassia (Unsupported Sitting, Static Balance)  minimal assist, 75% patient effort;moderate assist, 50 to 74% patient effort  -CR     Sitting Position (Unsupported Sitting, Static Balance)  sitting on edge of bed  -CR     Time Able to Maintain Position (Unsupported Sitting, Static Balance)  3 to 4 minutes  -CR     Comment (Unsupported Sitting, Static Balance)  leans posteriorly at times and needing mod A but mostly min A   -CR     Row Name 08/05/20 1042          Static Standing Balance    Level of Cassia (Supported Standing, Static Balance)  unable to perform activity  -CR       User Key  (r) = Recorded By, (t) = Taken By, (c) = Cosigned By    Initials Name Provider Type    CR Reyes, Carmela, PT Physical Therapist        Goals/Plan     Row Name 08/05/20 1059          Bed Mobility Goal 1 (PT)    Activity/Assistive Device (Bed Mobility Goal 1, PT)  sit to supine/supine to  sit  -CR     Angelina Level/Cues Needed (Bed Mobility Goal 1, PT)  minimum assist (75% or more patient effort);2 person assist  -CR     Time Frame (Bed Mobility Goal 1, PT)  2 weeks  -CR     Row Name 08/05/20 1059          Transfer Goal 1 (PT)    Activity/Assistive Device (Transfer Goal 1, PT)  transfers, all  -CR     Angelina Level/Cues Needed (Transfer Goal 1, PT)  minimum assist (75% or more patient effort);2 person assist  -CR     Time Frame (Transfer Goal 1, PT)  2 weeks  -CR     Row Name 08/05/20 1059          ROM Goal 1 (PT)    ROM Goal 1 (PT)  participate in AROM ex BLE   -CR     Time Frame (ROM Goal 1, PT)  2 weeks  -CR       User Key  (r) = Recorded By, (t) = Taken By, (c) = Cosigned By    Initials Name Provider Type    CR Reyes, Carmela, PT Physical Therapist        Clinical Impression     Row Name 08/05/20 1044          Pain Assessment    Additional Documentation  Pain Scale: FACES Pre/Post-Treatment (Group)  -CR     Row Name 08/05/20 1044          Pain Scale: Numbers Pre/Post-Treatment    Pain Location - Side  Bilateral  -CR     Pain Location - Orientation  upper  -CR     Pain Location  extremity  -CR     Row Name 08/05/20 1044          Pain Scale: FACES Pre/Post-Treatment    Pain: FACES Scale, Pretreatment  0-->no hurt  -CR     Pain: FACES Scale, Post-Treatment  4-->hurts little more  -CR     Pre/Post Treatment Pain Comment  no grimacing noted, extensor tone noted when UE moved  -CR     Row Name 08/05/20 1044          Plan of Care Review    Plan of Care Reviewed With  patient  -CR     Outcome Summary  76 y/o male admitted on 7/31 due to decreased responsiveness with shortness of breath. Pt placed on AVAPS. COVID (-). PMH includes bipolar disorder, hx of TBI, CABG. Pt was ambulatory using cane based on previous admit, currently pt on 4 point restraints and not opeing eyes when name is called. Pt required dependent assist with all bed mobility , min/mod A of 1 to sit EOB. Pt has tendency to lean  posteriorly while sitting EOB. Will continue to follow while in hospital and attempt to progress as able.Pt will need extensive assistance at d/c .PPE gloves,mask, face shield.   -CR     Row Name 08/05/20 1044          Physical Therapy Clinical Impression    Criteria for Skilled Interventions Met (PT Clinical Impression)  yes;treatment indicated  -CR     Rehab Potential (PT Clinical Summary)  fair, will monitor progress closely  -CR     Predicted Duration of Therapy (PT)  dc  -CR     Row Name 08/05/20 1044          Positioning and Restraints    Pre-Treatment Position  in bed  -CR     Post Treatment Position  bed  -CR     In Bed  notified nsg;side lying right;with other staff  -CR     Restraints  reapplied:;soft limb  -CR       User Key  (r) = Recorded By, (t) = Taken By, (c) = Cosigned By    Initials Name Provider Type    CR Reyes, Carmela, PT Physical Therapist        Outcome Measures     Row Name 08/05/20 1100          Modified Ringgold Scale    Modified Ring[place] Scale  5 - Severe disability.  Bedridden, incontinent, and requiring constant nursing care and attention.  -CR     Row Name 08/05/20 1100          Functional Assessment    Outcome Measure Options  Modified [place]  -CR       User Key  (r) = Recorded By, (t) = Taken By, (c) = Cosigned By    Initials Name Provider Type    CR Reyes, Carmela, PT Physical Therapist        Physical Therapy Education                 Title: PT OT SLP Therapies (In Progress)     Topic: Physical Therapy (In Progress)     Point: Mobility training (In Progress)     Description:   Instruct learner(s) on safety and technique for assisting patient out of bed, chair or wheelchair.  Instruct in the proper use of assistive devices, such as walker, crutches, cane or brace.              Patient Friendly Description:   It's important to get you on your feet again, but we need to do so in a way that is safe for you. Falling has serious consequences, and your personal safety is the most important  thing of all.        When it's time to get out of bed, one of us or a family member will sit next to you on the bed to give you support.     If your doctor or nurse tells you to use a walker, crutches, a cane, or a brace, be sure you use it every time you get out of bed, even if you think you don't need it.    Learning Progress Summary           Patient Nonacceptance, D, NL by CR at 8/5/2020 1100                               User Key     Initials Effective Dates Name Provider Type Discipline    CR 03/01/19 -  Reyes, Carmela, PT Physical Therapist PT              PT Recommendation and Plan  Planned Therapy Interventions (PT Eval): balance training, bed mobility training, gait training, home exercise program, neuromuscular re-education, patient/family education, postural re-education, strengthening, transfer training  Outcome Summary/Treatment Plan (PT)  Anticipated Discharge Disposition (PT): skilled nursing facility  Plan of Care Reviewed With: patient  Outcome Summary: 76 y/o male admitted on 7/31 due to decreased responsiveness with shortness of breath. Pt placed on AVAPS. COVID (-). PMH includes bipolar disorder, hx of TBI, CABG. Pt was ambulatory using cane based on previous admit, currently pt on 4 point restraints and not opeing eyes when name is called. Pt required dependent assist with all bed mobility , min/mod A of 1 to sit EOB. Pt has tendency to lean posteriorly while sitting EOB. Will continue to follow while in hospital and attempt to progress as able.Pt will need extensive assistance at d/c .PPE gloves,mask, face shield.      Time Calculation:   PT Charges     Row Name 08/05/20 1101             Time Calculation    Start Time  0839  -CR      Stop Time  0855  -CR      Time Calculation (min)  16 min  -CR      PT Received On  08/05/20  -CR      PT - Next Appointment  08/07/20  -CR      PT Goal Re-Cert Due Date  08/19/20  -CR         Time Calculation- PT    Total Timed Code Minutes- PT  0 minute(s)  -CR         User Key  (r) = Recorded By, (t) = Taken By, (c) = Cosigned By    Initials Name Provider Type    CR Reyes, Carmela, PT Physical Therapist        Therapy Charges for Today     Code Description Service Date Service Provider Modifiers Qty    [id number]  PT EVAL HIGH COMPLEXITY 4 8/5/2020 Reyes, Carmela, JOSHUA GP 1          PT G-Codes  Outcome Measure Options: Modified [place]  Modified [place] Scale: 5 - Severe disability.  Bedridden, incontinent, and requiring constant nursing care and attention.    Carmela Reyes, PT  8/5/2020

## 2020-08-05 NOTE — THERAPY EVALUATION
Acute Care - Occupational Therapy Initial Evaluation   Jose     Patient Name: Adolfo Alejandro  : 1945  MRN: [id number]  Today's Date: 2020             Admit Date: 2020       ICD-10-CM ICD-9-CM   1. Dyspnea, unspecified type R06.00 786.09   2. COPD exacerbation (CMS/[facility]) J44.1 491.21   3. Acute on chronic respiratory failure with hypoxia and hypercapnia (CMS/HCC) J96.21 518.84    J96.22 786.09     799.02   4. Altered mental status, unspecified altered mental status type R41.82 780.97     Patient Active Problem List   Diagnosis   • Abdominal aortic aneurysm (AAA) (CMS/[facility])   • Alcoholism (CMS/[facility])   • Arthralgia of right acromioclavicular joint   • Atherosclerotic heart disease of native coronary artery without angina pectoris   • Atrial fibrillation (CMS/[facility])   • Bilateral impacted cerumen   • Bipolar affective disorder (CMS/[facility])   • Cellulitis of right buttock   • Chronic back pain   • Pulmonary emphysema (CMS/[facility])   • Chronic obstructive pulmonary disease with acute exacerbation (CMS/[facility])   • Gastroesophageal reflux disease   • Generalized anxiety disorder   • Hip pain, right   • Hospital-acquired pneumonia   • Hx of traumatic brain injury   • Hyperlipidemia   • Hypertension   • Insomnia   • Kidney stone   • Nicotine dependence   • Osteoarthritis of hip   • Synovial cyst   • Other shoulder lesions, left shoulder   • Other specified dorsopathies, site unspecified   • Pneumonia   • Pain in shoulder   • Polyosteoarthritis   • Sprain of ligaments of lumbar spine   • Status post coronary artery bypass graft   • Status post hip replacement   • Subarachnoid hemorrhage (CMS/[facility])   • Subdural hematoma (CMS/HCC)   • COPD exacerbation (CMS/HCC)   • Chronic respiratory failure with hypercapnia (CMS/HCC)   • Dyspnea   • Dissection of thoracic aorta (CMS/HCC)     Past Medical History:   Diagnosis Date   • Anxiety    • Arthritis    • Bipolar affective disorder (CMS/[facility])    • COPD (chronic obstructive pulmonary  disease) (CMS/[facility])    • GERD (gastroesophageal reflux disease)    • Hypertension    • 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   • Memory loss, short term    • Sepsis (CMS/[facility])      Past Surgical History:   Procedure Laterality Date   • ABDOMINAL AORTIC ANEURYSM REPAIR     • CHOLECYSTECTOMY     • CORONARY ANGIOPLASTY     • CORONARY ARTERY BYPASS GRAFT  03/13/2014   • THORACOTOMY Right    • TOTAL HIP ARTHROPLASTY Left    • TOTAL HIP ARTHROPLASTY Right 02/03/2014          OT ASSESSMENT FLOWSHEET (last 12 hours)      Occupational Therapy Evaluation     Row Name 08/05/20 0840                   OT Evaluation Time/Intention    Subjective Information  no complaints  -XIOMARA        Document Type  evaluation  -XIOMARA        Mode of Treatment  co-treatment  -XIOMARA           General Information    Patient Profile Reviewed?  yes  -XIOMARA        Prior Level of Function  independent:;ADL's;all household mobility  -XIOMARA        Equipment Currently Used at Home  cane, straight  -XIOMARA        Pertinent History of Current Functional Problem  Pt is a 74 y/o WM presenting with decreased responsiveness. It was noted patient to have abnormal dissection and aneurysm in descending thoracic aorta. 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  -XIOMARA           Relationship/Environment    Lives With  alone  -XIOMARA           Resource/Environmental Concerns    Current Living Arrangements  home/apartment/condo  -XIOMARA           Cognitive Assessment/Intervention- PT/OT    Orientation Status (Cognition)  disoriented to;person;place;situation;time;verbal cues/prompts needed for orientation  -XIOMARA        Follows Commands (Cognition)  does not follow one step commands  -XIOMARA        Safety Deficit (Cognitive)  moderate deficit;impulsivity;judgment;insight into deficits/self awareness  -XIOMARA           Bed Mobility Assessment/Treatment    [place] Level (Bed Mobility)  moderate  assist (50% patient effort)  -XIOMARA        Bed Mobility, Safety Issues  cognitive deficits limit understanding;impaired trunk control for bed mobility  -XIOMARA           Functional Mobility    Functional Mobility- Ind. Level  unable to perform  -XIOMARA        Functional Mobility- Safety Issues  supplemental O2  -XIOMARA           ADL Assessment/Intervention    BADL Assessment/Intervention  grooming;upper body dressing  -XIOMARA           Upper Body Dressing Assessment/Training    Upper Body Dressing [place] Level  don;doff;upper body dressing skills;maximum assist (25% patient effort)  -        Upper Body Dressing Position  edge of bed sitting  -XIOMARA           Grooming Assessment/Training    [place] Level (Grooming)  grooming skills;maximum assist (25% patient effort)  -XIOMARA        Grooming Position  edge of bed sitting  -XIOMARA           BADL Safety/Performance    Impairments, BADL Safety/Performance  balance;cognition;endurance/activity tolerance  -        Cognitive Impairments, BADL Safety/Performance  attention;awareness, need for assistance;impulsivity;insight into deficits/self awareness  -        Skilled BADL Treatment/Intervention  adaptive equipment training;BADL process/adaptation training;cognitive/safety deficit modifications;energy conservation;environmental modifications  -           General ROM    GENERAL ROM COMMENTS  PROM WFL BUEs  -XIOMARA           MMT (Manual Muscle Testing)    General MMT Comments  unable to assess 2/2 impaired cognition and command following. Continue to monitor.   -XIOMARA           Positioning and Restraints    Pre-Treatment Position  in bed  -XIOMARA        Post Treatment Position  bed  -XIOMARA        In Bed  supine;notified nsg;call light within reach;encouraged to call for assist;exit alarm on;patient within staff view  -XIOMARA        Restraints  released:;reapplied:;notified nsg:  -XIOMARA           Pain Scale: FACES Pre/Post-Treatment    Pre/Post Treatment Pain Comment  Pt would tremor with possible pain, but  unable to fully assess 2/2 limited verbal communication, cognition and command following. Continue to monitor.   -XIOMARA           Plan of Care Review    Plan of Care Reviewed With  other (see comments) RN  -XIOMARA           Clinical Impression (OT)    OT Diagnosis  impaired indep with ADLs and functional mobility, impaired cognition, impaired orientaiton, impaired safety awareness, impaired balance  -XIOMARA        Rehab Potential (OT Eval)  fair, will monitor progress closely  -XIOMARA        Therapy Frequency (OT Eval)  3 times/wk  -XIOMARA        Anticipated Discharge Disposition (OT)  anticipate therapy at next level of care  -XIOMARA           Vital Signs    Recovery Time  VSS  -XIOMARA           OT Goals    Bathing Goal Selection (OT)  bathing, OT goal 1  -XIOMARA        Dressing Goal Selection (OT)  dressing, OT goal 1  -XIOMARA        Toileting Goal Selection (OT)  toileting, OT goal 1  -XIOMARA           Bathing Goal 1 (OT)    Activity/Assistive Device (Bathing Goal 1, OT)  bathing skills, all  -XIOMARA        [place] Level/Cues Needed (Bathing Goal 1, OT)  minimum assist (75% or more patient effort)  -XIOMARA        Time Frame (Bathing Goal 1, OT)  long term goal (LTG);2 weeks  -XIOMARA           Dressing Goal 1 (OT)    Activity/Assistive Device (Dressing Goal 1, OT)  dressing skills, all;upper body dressing;lower body dressing  -XIOMARA        [place]/Cues Needed (Dressing Goal 1, OT)  minimum assist (75% or more patient effort)  -XIOMARA        Time Frame (Dressing Goal 1, OT)  long term goal (LTG);2 weeks  -XIOMARA           Toileting Goal 1 (OT)    Activity/Device (Toileting Goal 1, OT)  toileting skills, all  -XIOMARA        [place] Level/Cues Needed (Toileting Goal 1, OT)  minimum assist (75% or more patient effort)  -XIOMARA        Time Frame (Toileting Goal 1, OT)  long term goal (LTG);2 weeks  -XIOMARA           OT Cognitive Goals    Attention Goal Selection (OT)  attention, OT goal 1  -XIOMARA           Attention Goal 1 (OT)    Activity (Attention Goal 1, OT)  maintain  attention;for 1 minute  -XIOMARA        [place] Level/Cues Needed (Attention Goal 1, OT)  verbal cues/redirection  -XIOMARA        Time Frame (Attention Goal 1, OT)  long term goal (LTG);2 weeks  -XIOMARA          User Key  (r) = Recorded By, (t) = Taken By, (c) = Cosigned By    Initials Name Effective Dates    Mary Samson OT 07/15/20 -          Occupational Therapy Education                 Title: PT OT SLP Therapies (In Progress)     Topic: Occupational Therapy (In Progress)     Point: ADL training (In Progress)     Description:   Instruct learner(s) on proper safety adaptation and remediation techniques during self care or transfers.   Instruct in proper use of assistive devices.              Learning Progress Summary           Patient Acceptance, E, NR,NL by XIOMARA at 8/5/2020 5664                   Point: Home exercise program (Not Started)     Description:   Instruct learner(s) on appropriate technique for monitoring, assisting and/or progressing therapeutic exercises/activities.              Learner Progress:   Not documented in this visit.          Point: Precautions (Not Started)     Description:   Instruct learner(s) on prescribed precautions during self-care and functional transfers.              Learner Progress:   Not documented in this visit.          Point: Body mechanics (Not Started)     Description:   Instruct learner(s) on proper positioning and spine alignment during self-care, functional mobility activities and/or exercises.              Learner Progress:   Not documented in this visit.                      User Key     Initials Effective Dates Name Provider Type Discipline    XIOMARA 07/15/20 -  Mary Franklin OT Occupational Therapist OT                  OT Recommendation and Plan  Outcome Summary/Treatment Plan (OT)  Anticipated Discharge Disposition (OT): anticipate therapy at next level of care  Therapy Frequency (OT Eval): 3 times/wk  Plan of Care Review  Plan of Care Reviewed With: other  (see comments)(RN)  Plan of Care Reviewed With: other (see comments)(RN)  Outcome Summary: Pt is a 76 y/o WM presenting with decreased responsiveness. It was noted patient to have abnormal dissection and aneurysm in descending thoracic aorta. 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 Per charting earlier this year, patient was indep with ADLs and mobility using a cane. Unable to obtain PLOF due to confusion. No family at bedside. Pt currently in 4 point restraints with sitter at bedside. Pt found supine with HOB elevated upon entry. Restraints unsecured during treatment. OT applied cold washcloth to facilitate increased alertness, however was minimally effective. Pt transferred supine to EOB with min-mod A x 2, requiring intermittent assist levels due to posterior lean and poor command following (CGA-mod A x 1). Pt returned to supine with mod A x 2, then boosted towards HOB with min-mod A x 2. Restraints resecured. Pt will benefit from continued treatment to prevent further decline and promote ADL indep. OT will see patient 2-3x/week. OT recommends discharge to IP rehab pending progress and medical stability. Chair alarm activated. RN notified. Pt instructed to use call light when returning to bed. RN notified. Pt will benefit from continued treatment 5x/[facility] at [facility], following with IP rehab vs home with HH upon discharge pending progress and medical stability. PPE worn during session including gloves, eye shield and mask.    Outcome Measures     Row Name 08/05/20 8561             How much help from another is currently needed...    Putting on and taking off regular lower body clothing?  1  -XIOMARA      Bathing (including washing, rinsing, and drying)  1  -XIOMARA      Toileting (which includes using toilet bed pan or urinal)  1  -XIOMARA      Putting on and taking off regular upper body clothing  1  -XIOMARA      Taking care of personal grooming (such as  brushing teeth)  1  -XIOMARA      Eating meals  1  -XIOMARA      AM-PAC 6 Clicks Score (OT)  6  -XIOMARA         Functional Assessment    Outcome Measure Options  AM-PAC 6 Clicks Daily Activity (OT)  -XIOMARA        User Key  (r) = Recorded By, (t) = Taken By, (c) = Cosigned By    Initials Name Provider Type    Mary Samson OT Occupational Therapist          Time Calculation:   Time Calculation- OT     Row Name 08/05/20 1341             Time Calculation- OT    OT Start Time  0840  -XIOMARA      OT Stop Time  0853  -XIOMARA      OT Time Calculation (min)  13 min  -XIOMARA      OT Received On  08/05/20  -XIOMARA      OT - Next Appointment  08/07/20  -      OT Goal Re-Cert Due Date  08/19/20  -        User Key  (r) = Recorded By, (t) = Taken By, (c) = Cosigned By    Initials Name Provider Type    Mary Samson OT Occupational Therapist        Therapy Charges for Today     Code Description Service Date Service Provider Modifiers Qty    00860032772 HC OT EVAL HIGH COMPLEXITY 4 8/5/2020 Mary Franklin OT GO 1               Mary Franklin OT  8/5/2020

## 2020-08-05 NOTE — PLAN OF CARE
Problem: Patient Care Overview  Goal: Plan of Care Review  Outcome: Ongoing (interventions implemented as appropriate)  Flowsheets (Taken 8/5/2020 104)  Plan of Care Reviewed With: patient  Outcome Summary: 76 y/o male admitted on 7/31 due to decreased responsiveness with shortness of breath. Pt placed on AVAPS. COVID (-). PMH includes bipolar disorder, hx of TBI, CABG. Pt was ambulatory using cane based on previous admit, currently pt on 4 point restraints and not opeing eyes when name is called. Pt required dependent assist with all bed mobility , min/mod A of 1 to sit EOB. Pt has tendency to lean posteriorly while sitting EOB. Will continue to follow while in hospital and attempt to progress as able.Pt will need extensive assistance at d/c .PPE gloves,mask, face shield.

## 2020-08-05 NOTE — NURSING NOTE
Spoke with Dr. Corbin and orders were received on Mr. Alejandro. Dr. Corbin aware that the patient is on levophed for hypotension. Advised Dr. Shaikh of Dr. Corbin's orders.

## 2020-08-05 NOTE — PLAN OF CARE
Problem: Patient Care Overview  Goal: Plan of Care Review  Outcome: Ongoing (interventions implemented as appropriate)  Flowsheets (Taken 8/5/2020 0332)  Outcome Summary: pt remains in restraints x4.  Tolerates PO meds and Reg foods. Pt on 5l hf. VSS at this time. will continue to monitor  Goal: Individualization and Mutuality  Outcome: Ongoing (interventions implemented as appropriate)  Goal: Discharge Needs Assessment  Outcome: Ongoing (interventions implemented as appropriate)  Goal: Interprofessional Rounds/Family Conf  Outcome: Ongoing (interventions implemented as appropriate)     Problem: Fall Risk (Adult)  Goal: Identify Related Risk Factors and Signs and Symptoms  Outcome: Ongoing (interventions implemented as appropriate)  Goal: Absence of Fall  Outcome: Ongoing (interventions implemented as appropriate)     Problem: Restraint, Nonbehavioral (Nonviolent)  Goal: Rationale and Justification  Outcome: Ongoing (interventions implemented as appropriate)  Goal: Nonbehavioral (Nonviolent) Restraint: Absence of Injury/Harm  Outcome: Ongoing (interventions implemented as appropriate)  Goal: Nonbehavioral (Nonviolent) Restraint: Achievement of Discontinuation Criteria  Outcome: Ongoing (interventions implemented as appropriate)  Goal: Nonbehavioral (Nonviolent) Restraint: Preservation of Dignity and Wellbeing  Outcome: Ongoing (interventions implemented as appropriate)     Problem: Skin Injury Risk (Adult)  Goal: Identify Related Risk Factors and Signs and Symptoms  Outcome: Ongoing (interventions implemented as appropriate)  Goal: Skin Health and Integrity  Outcome: Ongoing (interventions implemented as appropriate)

## 2020-08-05 NOTE — CONSULTS
Nephrology Consult Note                                                [facility]      Patient Identification:  Name: Adolfo Alejandro  Age: 75 y.o.  Sex: male  :  1945  MRN: [id number]               Requesting Physician: Tucker Upton MD  Reason for Consultation: management recommendations      History of Present Illness:    Patient is a 75-year-old white male patient with history of anxiety arthritis bipolar disorder COPD GERD hypertension coronary disease status post CABG congestive heart failure who was admitted to the hospital with confusion and chest pain was found to have dissecting over 2 which is not new received IV contrast with that is been hypoxic on a lot of oxygen developed decreased urine output and increasing his creatinine prompting renal consultation  Problem List:  Patient Active Problem List   Diagnosis   • Abdominal aortic aneurysm (AAA) (CMS/[facility])   • Alcoholism (CMS/[facility])   • Arthralgia of right acromioclavicular joint   • Atherosclerotic heart disease of native coronary artery without angina pectoris   • Atrial fibrillation (CMS/[facility])   • Bilateral impacted cerumen   • Bipolar affective disorder (CMS/[facility])   • Cellulitis of right buttock   • Chronic back pain   • Pulmonary emphysema (CMS/[facility])   • Chronic obstructive pulmonary disease with acute exacerbation (CMS/[facility])   • Gastroesophageal reflux disease   • Generalized anxiety disorder   • Hip pain, right   • Hospital-acquired pneumonia   • Hx of traumatic brain injury   • Hyperlipidemia   • Hypertension   • Insomnia   • Kidney stone   • Nicotine dependence   • Osteoarthritis of hip   • Synovial cyst   • Other shoulder lesions, left shoulder   • Other specified dorsopathies, site unspecified   • Pneumonia   • Pain in shoulder   • Polyosteoarthritis   • Sprain of ligaments of lumbar spine   • Status post coronary artery bypass graft   • Status post hip replacement   • Subarachnoid hemorrhage (CMS/[facility])   •  Subdural hematoma (CMS/[facility])   • COPD exacerbation (CMS/[facility])   • Chronic respiratory failure with hypercapnia (CMS/[facility])   • Dyspnea   • Dissection of thoracic aorta (CMS/[facility])     Past Medical History:  Past Medical History:   Diagnosis Date   • Anxiety    • Arthritis    • Bipolar affective disorder (CMS/[facility])    • COPD (chronic obstructive pulmonary disease) (CMS/[facility])    • GERD (gastroesophageal reflux disease)    • Hypertension    • 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 (CMS/[facility]) 2017   • Memory loss, short term    • Sepsis (CMS/[facility])      Past Surgical History:  Past Surgical History:   Procedure Laterality Date   • ABDOMINAL AORTIC ANEURYSM REPAIR     • CHOLECYSTECTOMY     • CORONARY ANGIOPLASTY     • CORONARY ARTERY BYPASS GRAFT  03/13/2014   • THORACOTOMY Right    • TOTAL HIP ARTHROPLASTY Left    • TOTAL HIP ARTHROPLASTY Right 02/03/2014      Home Meds:  Medications Prior to Admission   Medication Sig Dispense Refill Last Dose   • amiodarone (PACERONE) 200 MG tablet Take 100 mg by mouth Daily.      • budesonide (PULMICORT) 0.5 MG/2ML nebulizer solution Use bid VIA nebulizor   Dx j44.9 (Patient taking differently: Take 0.5 mg by nebulization 2 (Two) Times a Day.) 100 each 4 Taking   • furosemide (LASIX) 40 MG tablet TAKE 1 TABLET BY MOUTH DAILY 30 tablet 2    • ipratropium-albuterol (DUO-NEB) 0.5-2.5 mg/3 ml nebulizer USE 1 VIAL VIA NEBULIZER FOUR TIMES DAILY (Patient taking differently: Take 3 mL by nebulization 4 (Four) Times a Day.) 360 mL 5    • lamoTRIgine (LaMICtal) 100 MG tablet Take 200 mg by mouth Every Morning.  1 Taking   • lamoTRIgine (LaMICtal) 100 MG tablet Take 100 mg by mouth Every Evening.   Taking   • losartan (COZAAR) 100 MG tablet Take 100 mg by mouth Daily.   Taking   • melatonin 5 MG tablet tablet Take 20 mg by mouth Every Night.   Taking   • metoprolol succinate XL (TOPROL-XL) 100 MG 24 hr tablet TAKE ONE TABLET BY MOUTH DAILY 90 tablet 2    • Morphine (MS Contin) 15 MG  12 hr tablet Take 1 tablet by mouth 2 (Two) Times a Day. 60 tablet 0    • PARoxetine (PAXIL) 40 MG tablet Take 40 mg by mouth Daily.  0 Taking   • QUEtiapine (SEROquel) 50 MG tablet Take 50 mg by mouth Every Night.  0 Taking   • roflumilast (DALIRESP) 500 MCG tablet tablet Take 1 tablet by mouth Daily. 30 tablet 6 Taking     Current Meds:     Current Facility-Administered Medications:   •  acetaminophen (TYLENOL) tablet 650 mg, 650 mg, Oral, Q4H PRN, 650 mg at 08/04/20 2140 **OR** acetaminophen (TYLENOL) suppository 650 mg, 650 mg, Rectal, Q4H PRN, Adama Trejo APRN, 650 mg at 08/03/20 0403  •  aluminum-magnesium hydroxide-simethicone (MAALOX MAX) 400-400-40 MG/5ML suspension 15 mL, 15 mL, Oral, Q6H PRN, Adama Trejo APRN  •  amLODIPine (NORVASC) tablet 10 mg, 10 mg, Oral, Q24H, Sophie Morgan MD, 10 mg at 08/05/20 0814  •  bisacodyl (DULCOLAX) suppository 10 mg, 10 mg, Rectal, Daily PRN, Adama Trejo APRN, 10 mg at 08/04/20 1948  •  budesonide (PULMICORT) nebulizer solution 0.5 mg, 0.5 mg, Nebulization, BID - RT, Adama Trejo APRN, 0.5 mg at 08/05/20 0735  •  dextrose (D50W) 25 g/ 50mL Intravenous Solution 50 mL, 50 mL, Intravenous, Q1H PRN, Macie Shaikh MD  •  dextrose (D5W) 5 % infusion, 50 mL/hr, Intravenous, Continuous, Macie Shaikh MD, Last Rate: 50 mL/hr at 08/05/20 1000, 50 mL/hr at 08/05/20 1000  •  heparin (porcine) 5000 UNIT/ML injection 5,000 Units, 5,000 Units, Subcutaneous, Q8H, Adama Trejo APRN, 5,000 Units at 08/05/20 1414  •  hydrALAZINE (APRESOLINE) injection 10 mg, 10 mg, Intravenous, Q4H PRN, Day, Paola, APRN, 10 mg at 08/04/20 1325  •  insulin regular (humuLIN R,novoLIN R) injection 10 Units, 10 Units, Subcutaneous, Once, Macie Shaikh MD  •  ipratropium-albuterol (DUO-NEB) nebulizer solution 3 mL, 3 mL, Nebulization, Q2H PRN, Adama Trejo APRN  •  ipratropium-albuterol (DUO-NEB) nebulizer solution 3 mL, 3 mL, Nebulization, 4x Daily - RT, Love, Adama E,  APRN, 3 mL at 08/05/20 0735  •  lamoTRIgine (LaMICtal) tablet 100 mg, 100 mg, Oral, Nightly, Adama Trejo APRN, 100 mg at 08/04/20 2141  •  lamoTRIgine (LaMICtal) tablet 200 mg, 200 mg, Oral, Daily, Adama Trejo APRN, 200 mg at 08/05/20 0816  •  Linezolid (ZYVOX) 600 mg 300 mL, 600 mg, Intravenous, Q12H, Macie Shaikh MD  •  magnesium hydroxide (MILK OF MAGNESIA) suspension 2400 mg/10mL 10 mL, 10 mL, Oral, Daily PRN, Adama Trejo APRN  •  Magnesium Sulfate 2 gram infusion - Mg less than or equal to 1.5 mg/dL, 2 g, Intravenous, PRN **OR** Magnesium Sulfate 1 gram infusion - Mg 1.6-1.9 mg/dL, 1 g, Intravenous, PRN, Adama Trejo APRN, Last Rate: 100 mL/hr at 08/02/20 0857, 1 g at 08/02/20 0857  •  methylPREDNISolone sodium succinate (SOLU-Medrol) injection 40 mg, 40 mg, Intravenous, Q8H, Macie Shaikh MD, 40 mg at 08/05/20 1414  •  metoprolol tartrate (LOPRESSOR) tablet 50 mg, 50 mg, Oral, Q12H, Sophie Morgan MD, 50 mg at 08/05/20 0807  •  niCARdipine (CARDENE) 50 mg in 250 mL NS (0.2 mg/mL) infusion, 2.5-15 mg/hr, Intravenous, Titrated, Tucker Upton MD, Stopped at 08/03/20 2015  •  ondansetron (ZOFRAN) tablet 4 mg, 4 mg, Oral, Q6H PRN **OR** ondansetron (ZOFRAN) injection 4 mg, 4 mg, Intravenous, Q6H PRN, Adama Trejo APRN  •  oxyCODONE (ROXICODONE) immediate release tablet 10 mg, 10 mg, Oral, Q4H PRN, Sophie Morgan MD, 10 mg at 08/05/20 0952  •  [START ON 8/12/2020] PARoxetine (PAXIL) tablet 40 mg, 40 mg, Oral, Daily, Macie Shaikh MD  •  piperacillin-tazobactam (ZOSYN) IVPB 4.5 g in 100 mL NS (CD), 4.5 g, Intravenous, Q12H, Natalie Pantoja, APRN  •  potassium chloride (K-DUR,KLOR-CON) CR tablet 40 mEq, 40 mEq, Oral, PRN **OR** potassium chloride (KLOR-CON) packet 40 mEq, 40 mEq, Oral, PRN, 40 mEq at 08/03/20 2015 **OR** potassium chloride 10 mEq in 100 mL IVPB, 10 mEq, Intravenous, Q1H PRN, Adama Trejo, APRN, Last Rate: 100 mL/hr at 08/03/20 1241, 10 mEq at  "08/03/20 1241  •  [COMPLETED] Insert peripheral IV, , , Once **AND** sodium chloride 0.9 % flush 10 mL, 10 mL, Intravenous, PRN, Willi Santamaria MD  •  sodium chloride 0.9 % flush 10 mL, 10 mL, Intravenous, Q12H, Adama Trejo APRN, 10 mL at 08/05/20 0817  •  sodium chloride 0.9 % flush 10 mL, 10 mL, Intravenous, PRN, Adama Trejo APRN  •  vasopressin 20 units/100 mL (0.2 units/mL) 0.9% NS infusion, 0.03 Units/min, Intravenous, Continuous, Natalie Pantoja APRN, Stopped at 08/05/20 1504    Allergies:  No Known Allergies  Social History:   Social History     Tobacco Use   • Smoking status: Former Smoker     Types: Cigarettes   • Smokeless tobacco: Never Used   Substance Use Topics   • Alcohol use: Not Currently     Frequency: Never     Comment: former ETOH abuse Hx      Family History:  Family History   Problem Relation Age of Onset   • Heart disease Other    • Hypertension Other    • Hyperlipidemia Other         Review of Systems  Reviewed 12 systems were reviewed, all negative except for those mentioned in HPI    Objective:  Vitals:   /42   Pulse 67   Temp 98.5 °F (36.9 °C) (Axillary)   Resp 20   Ht 165.1 cm (65\")   Wt 54.6 kg (120 lb 5.9 oz)   SpO2 95%   BMI 20.03 kg/m²   I/O:     Intake/Output Summary (Last 24 hours) at 8/5/2020 1520  Last data filed at 8/4/2020 2000  Gross per 24 hour   Intake 120 ml   Output --   Net 120 ml       Exam:  General Appearance:  Alert  Head:  Normocephalic, without obvious abnormality, atraumatic  Eyes:  PERRL, conjunctiva/corneas clear     Neck:  Supple,  no adenopathy;      Lungs:  Decreased BS occasion ronchi  Heart:  Regular rate and rhythm, S1 and S2 normal  Abdomen:  Soft, non-tender, bowel sounds active   Extremities: trace edema  Pulses: 2+ and symmetric all extremities  Skin:  No rashes or lesions  Data Review:  All labs (24hrs):   Recent Results (from the past 24 hour(s))   POC Glucose Once    Collection Time: 08/04/20  5:00 PM   Result Value Ref Range "    Glucose 110 (H) 70 - 105 mg/dL   POC Glucose Once    Collection Time: 08/04/20  7:50 PM   Result Value Ref Range    Glucose 102 70 - 105 mg/dL   Magnesium    Collection Time: 08/05/20  3:34 AM   Result Value Ref Range    Magnesium 2.8 (H) 1.6 - 2.4 mg/dL   Phosphorus    Collection Time: 08/05/20  3:34 AM   Result Value Ref Range    Phosphorus 4.0 2.5 - 4.5 mg/dL   Comprehensive Metabolic Panel    Collection Time: 08/05/20  3:34 AM   Result Value Ref Range    Glucose 151 (H) 65 - 99 mg/dL    BUN      Creatinine 3.30 (H) 0.76 - 1.27 mg/dL    Sodium 151 (H) 136 - 145 mmol/L    Potassium 5.4 (H) 3.5 - 5.2 mmol/L    Chloride 102 98 - 107 mmol/L    CO2 33.0 (H) 22.0 - 29.0 mmol/L    Calcium 9.5 8.6 - 10.5 mg/dL    Total Protein 7.1 6.0 - 8.5 g/dL    Albumin 3.40 (L) 3.50 - 5.20 g/dL    ALT (SGPT) 47 (H) 1 - 41 U/L    AST (SGOT) 118 (H) 1 - 40 U/L    Alkaline Phosphatase 269 (H) 39 - 117 U/L    Total Bilirubin 0.7 0.0 - 1.2 mg/dL    eGFR Non African Amer 18 (L) >60 mL/min/1.73    Globulin 3.7 gm/dL    A/G Ratio 0.9 g/dL    BUN/Creatinine Ratio      Anion Gap 16.0 (H) 5.0 - 15.0 mmol/L   CBC Auto Differential    Collection Time: 08/05/20  3:34 AM   Result Value Ref Range    WBC 22.20 (H) 3.40 - 10.80 10*3/mm3    RBC 4.27 4.14 - 5.80 10*6/mm3    Hemoglobin 12.0 (L) 13.0 - 17.7 g/dL    Hematocrit 38.0 37.5 - 51.0 %    MCV 89.0 79.0 - 97.0 fL    MCH 28.1 26.6 - 33.0 pg    MCHC 31.6 31.5 - 35.7 g/dL    RDW 15.9 (H) 12.3 - 15.4 %    RDW-SD 50.3 37.0 - 54.0 fl    MPV 7.4 6.0 - 12.0 fL    Platelets 312 140 - 450 10*3/mm3   Scan Slide    Collection Time: 08/05/20  3:34 AM   Result Value Ref Range    Scan Slide     BUN    Collection Time: 08/05/20  3:34 AM   Result Value Ref Range    BUN 71 (H) 8 - 23 mg/dL   Manual Differential    Collection Time: 08/05/20  3:34 AM   Result Value Ref Range    Neutrophil % 76.0 42.7 - 76.0 %    Lymphocyte % 7.0 (L) 19.6 - 45.3 %    Monocyte % 10.0 5.0 - 12.0 %    Bands %  7.0 (H) 0.0 - 5.0 %     Neutrophils Absolute 18.43 (H) 1.70 - 7.00 10*3/mm3    Lymphocytes Absolute 1.55 0.70 - 3.10 10*3/mm3    Monocytes Absolute 2.22 (H) 0.10 - 0.90 10*3/mm3    RBC Morphology Normal Normal    Toxic Granulation Slight/1+ None Seen    Platelet Morphology Normal Normal   Procalcitonin    Collection Time: 08/05/20 11:07 AM   Result Value Ref Range    Procalcitonin 6.00 (H) 0.00 - 0.25 ng/mL   MRSA Screen, PCR (Inpatient) - Swab, Nares    Collection Time: 08/05/20 11:15 AM   Result Value Ref Range    MRSA PCR MRSA Detected (A) No MRSA Detected   Respiratory Panel, PCR - Swab, Nasopharynx    Collection Time: 08/05/20 11:15 AM   Result Value Ref Range    ADENOVIRUS, PCR Not Detected Not Detected    Coronavirus 229E Not Detected Not Detected    Coronavirus HKU1 Not Detected Not Detected    Coronavirus NL63 Not Detected Not Detected    Coronavirus OC43 Not Detected Not Detected    Human Metapneumovirus Not Detected Not Detected    Human Rhinovirus/Enterovirus Not Detected Not Detected    Influenza B PCR Not Detected Not Detected    Parainfluenza Virus 1 Not Detected Not Detected    Parainfluenza Virus 2 Not Detected Not Detected    Parainfluenza Virus 3 Not Detected Not Detected    Parainfluenza Virus 4 Not Detected Not Detected    Bordetella pertussis pcr Not Detected Not Detected    Influenza A H1 2009 PCR Not Detected Not Detected    Chlamydophila pneumoniae PCR Not Detected Not Detected    Mycoplasma pneumo by PCR Not Detected Not Detected    Influenza A PCR Not Detected Not Detected    Influenza A H3 Not Detected Not Detected    Influenza A H1 Not Detected Not Detected    RSV, PCR Not Detected Not Detected    Bordetella parapertussis PCR Not Detected Not Detected   Urinalysis With Culture If Indicated - Urine, Clean Catch    Collection Time: 08/05/20 11:18 AM   Result Value Ref Range    Color, UA Dark Yellow (A) Yellow, Straw    Appearance, UA Cloudy (A) Clear    pH, UA 7.5 5.0 - 8.0    Specific Gravity, UA 1.021 1.005 -  1.030    Glucose, UA Negative Negative    Ketones, UA Negative Negative    Bilirubin, UA Small (1+) (A) Negative    Blood, UA Small (1+) (A) Negative    Protein, UA 30 mg/dL (1+) (A) Negative    Leuk Esterase, UA Moderate (2+) (A) Negative    Nitrite, UA Negative Negative    Urobilinogen, UA 1.0 E.U./dL 0.2 - 1.0 E.U./dL   Urinalysis, Microscopic Only - Urine, Clean Catch    Collection Time: 08/05/20 11:18 AM   Result Value Ref Range    RBC, UA 0-2 (A) None Seen /HPF    WBC, UA 13-20 (A) None Seen /HPF    Bacteria, UA Trace (A) None Seen /HPF    Squamous Epithelial Cells, UA None Seen None Seen, 0-2 /HPF    Hyaline Casts, UA None Seen None Seen /LPF    Methodology Manual Light Microscopy    Legionella Antigen, Urine - Urine, Urine, Clean Catch    Collection Time: 08/05/20 11:19 AM   Result Value Ref Range    LEGIONELLA ANTIGEN, URINE Negative Negative   S. Pneumo Ag Urine or CSF - Urine, Urine, Clean Catch    Collection Time: 08/05/20 11:19 AM   Result Value Ref Range    Strep Pneumo Ag Positive (A) Negative   Blood Gas, Arterial    Collection Time: 08/05/20 11:37 AM   Result Value Ref Range    Site Right Brachial     Andres's Test Positive     pH, Arterial 7.418 7.350 - 7.450 pH units    pCO2, Arterial 48.6 (H) 35.0 - 48.0 mm Hg    pO2, Arterial 69.1 (L) 83.0 - 108.0 mm Hg    HCO3, Arterial 31.3 (H) 21.0 - 28.0 mmol/L    Base Excess, Arterial 5.6 (H) 0.0 - 3.0 mmol/L    O2 Saturation, Arterial 93.6 (L) 94.0 - 98.0 %    CO2 Content 32.8 (H) 22 - 29 mmol/L    Barometric Pressure for Blood Gas      Modality HFNC     FIO2 <21 %    Hemodilution No    POCT Electrolytes +HGB +HCT    Collection Time: 08/05/20 11:37 AM   Result Value Ref Range    Sodium 149 (H) 138 - 146 mmol/L    POC Potassium 6.3 (C) 3.5 - 4.5 mmol/L    Ionized Calcium 0.91 (L) 1.15 - 1.33 mmol/L    Glucose 93 74 - 100 mg/dL    Hematocrit 41 38 - 51 %    Hemoglobin 13.8 12.0 - 17.0 g/dL   POC Lactate    Collection Time: 08/05/20 11:37 AM   Result Value Ref  Range    Lactate 4.8 (C) 0.5 - 2.0 mmol/L   POC Glucose Once    Collection Time: 08/05/20 11:37 AM   Result Value Ref Range    Glucose 93 74 - 100 mg/dL   Lactic Acid, Reflex Timer (This will reflex a repeat order 3-3:15 hours after ordered.)    Collection Time: 08/05/20 11:37 AM   Result Value Ref Range    Hold Tube Hold for add-ons.    POC Glucose Once    Collection Time: 08/05/20 12:18 PM   Result Value Ref Range    Glucose 106 (H) 70 - 105 mg/dL   Lactic Acid, Plasma    Collection Time: 08/05/20 12:19 PM   Result Value Ref Range    Lactate 3.2 (C) 0.5 - 2.0 mmol/L       Current Facility-Administered Medications:   •  acetaminophen (TYLENOL) tablet 650 mg, 650 mg, Oral, Q4H PRN, 650 mg at 08/04/20 2140 **OR** acetaminophen (TYLENOL) suppository 650 mg, 650 mg, Rectal, Q4H PRN, Adama Trejo APRN, 650 mg at 08/03/20 0403  •  aluminum-magnesium hydroxide-simethicone (MAALOX MAX) 400-400-40 MG/5ML suspension 15 mL, 15 mL, Oral, Q6H PRN, Adama Trejo APRN  •  amLODIPine (NORVASC) tablet 10 mg, 10 mg, Oral, Q24H, Sophie Morgan MD, 10 mg at 08/05/20 0814  •  bisacodyl (DULCOLAX) suppository 10 mg, 10 mg, Rectal, Daily PRN, Adama Trejo APRN, 10 mg at 08/04/20 1948  •  budesonide (PULMICORT) nebulizer solution 0.5 mg, 0.5 mg, Nebulization, BID - RT, Adama Trejo APRN, 0.5 mg at 08/05/20 0735  •  dextrose (D50W) 25 g/ 50mL Intravenous Solution 50 mL, 50 mL, Intravenous, Q1H PRN, Macie Shaikh MD  •  dextrose (D5W) 5 % infusion, 50 mL/hr, Intravenous, Continuous, Macie Shaikh MD, Last Rate: 50 mL/hr at 08/05/20 1000, 50 mL/hr at 08/05/20 1000  •  heparin (porcine) 5000 UNIT/ML injection 5,000 Units, 5,000 Units, Subcutaneous, Q8H, Adama Trejo APRN, 5,000 Units at 08/05/20 1414  •  hydrALAZINE (APRESOLINE) injection 10 mg, 10 mg, Intravenous, Q4H PRN, Day, Paola APRN, 10 mg at 08/04/20 1325  •  insulin regular (humuLIN R,novoLIN R) injection 10 Units, 10 Units, Subcutaneous, Once, Yasin,  MD Macie  •  ipratropium-albuterol (DUO-NEB) nebulizer solution 3 mL, 3 mL, Nebulization, Q2H PRN, Adama Trejo APRN  •  ipratropium-albuterol (DUO-NEB) nebulizer solution 3 mL, 3 mL, Nebulization, 4x Daily - RT, Adama Trejo APRN, 3 mL at 08/05/20 0735  •  lamoTRIgine (LaMICtal) tablet 100 mg, 100 mg, Oral, Nightly, Adama Trejo APRN, 100 mg at 08/04/20 2141  •  lamoTRIgine (LaMICtal) tablet 200 mg, 200 mg, Oral, Daily, Adama Trejo APRN, 200 mg at 08/05/20 0816  •  Linezolid (ZYVOX) 600 mg 300 mL, 600 mg, Intravenous, Q12H, Macie Shaikh MD  •  magnesium hydroxide (MILK OF MAGNESIA) suspension 2400 mg/10mL 10 mL, 10 mL, Oral, Daily PRN, Adama Trejo APRN  •  Magnesium Sulfate 2 gram infusion - Mg less than or equal to 1.5 mg/dL, 2 g, Intravenous, PRN **OR** Magnesium Sulfate 1 gram infusion - Mg 1.6-1.9 mg/dL, 1 g, Intravenous, PRN, Adama Trejo APRN, Last Rate: 100 mL/hr at 08/02/20 0857, 1 g at 08/02/20 0857  •  methylPREDNISolone sodium succinate (SOLU-Medrol) injection 40 mg, 40 mg, Intravenous, Q8H, Macie Shaikh MD, 40 mg at 08/05/20 1414  •  metoprolol tartrate (LOPRESSOR) tablet 50 mg, 50 mg, Oral, Q12H, Sophie Morgan MD, 50 mg at 08/05/20 0807  •  niCARdipine (CARDENE) 50 mg in 250 mL NS (0.2 mg/mL) infusion, 2.5-15 mg/hr, Intravenous, Titrated, Tucker Upton MD, Stopped at 08/03/20 2015  •  ondansetron (ZOFRAN) tablet 4 mg, 4 mg, Oral, Q6H PRN **OR** ondansetron (ZOFRAN) injection 4 mg, 4 mg, Intravenous, Q6H PRN, Adama Trejo APRN  •  oxyCODONE (ROXICODONE) immediate release tablet 10 mg, 10 mg, Oral, Q4H PRN, Sophie Morgan MD, 10 mg at 08/05/20 0952  •  [START ON 8/12/2020] PARoxetine (PAXIL) tablet 40 mg, 40 mg, Oral, Daily, Macie Shaikh MD  •  piperacillin-tazobactam (ZOSYN) IVPB 4.5 g in 100 mL NS (CD), 4.5 g, Intravenous, Q12H, Natalie Pantoja APRN  •  potassium chloride (K-DUR,KLOR-CON) CR tablet 40 mEq, 40 mEq, Oral, PRN **OR**  potassium chloride (KLOR-CON) packet 40 mEq, 40 mEq, Oral, PRN, 40 mEq at 08/03/20 2015 **OR** potassium chloride 10 mEq in 100 mL IVPB, 10 mEq, Intravenous, Q1H PRN, Adama Trejo APRN, Last Rate: 100 mL/hr at 08/03/20 1241, 10 mEq at 08/03/20 1241  •  [COMPLETED] Insert peripheral IV, , , Once **AND** sodium chloride 0.9 % flush 10 mL, 10 mL, Intravenous, PRN, Willi Santamaria MD  •  sodium chloride 0.9 % flush 10 mL, 10 mL, Intravenous, Q12H, Adama Trejo E, APRN, 10 mL at 08/05/20 0817  •  sodium chloride 0.9 % flush 10 mL, 10 mL, Intravenous, PRN, Adama Trejo E, APRN  •  vasopressin 20 units/100 mL (0.2 units/mL) 0.9% NS infusion, 0.03 Units/min, Intravenous, Continuous, Natalie Pantoja APRN, Stopped at 08/05/20 1504    Assessment:  Acute kidney injury likely due to the combination of contrast-induced nephropathy and ATN and diuretic use and possible AIN from antibiotics  Hyperkalemia  Hyponatremia  Urinary retention  Acute respiratory failure  Hypoxia  Fever  Pneumonia  COPD exacerbation  Congestive heart failure  Descending thoracic aorta dissection  Hypertension with hypertensive urgency  Sinus bradycardia  Bipolar disorder  Chronic pain  Coronary artery disease status post CABG  Altered mental status        Recommendations:  Adjust antibiotics dose to low creatinine clearance especially Zosyn  Aggressive medical therapy of hyperkalemia  Possible need for dialysis  Recheck labs stat  Discussed with the nurse      Fannie Corbin MD  8/5/2020  15:20

## 2020-08-05 NOTE — PLAN OF CARE
Problem: Patient Care Overview  Goal: Plan of Care Review  Outcome: Ongoing (interventions implemented as appropriate)  Flowsheets  Taken 8/5/2020 1044 by Reyes, Carmela, PT  Plan of Care Reviewed With: patient  Taken 8/5/2020 1339 by Mary Franklin OT  Outcome Summary: Pt is a 76 y/o WM presenting with decreased responsiveness. It was noted patient to have abnormal dissection and aneurysm in descending thoracic aorta. 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 Per charting earlier this year, patient was indep with ADLs and mobility using a cane. Unable to obtain PLOF due to confusion. No family at bedside. Pt currently in 4 point restraints with sitter at bedside.        Pt found supine with HOB elevated upon entry. Restraints unsecured during treatment. OT applied cold washcloth to facilitate increased alertness, however was minimally effective. Pt transferred supine to EOB with min-mod A x 2, requiring intermittent assist levels due to posterior lean and poor command following (CGA-mod A x 1). Pt returned to supine with mod A x 2, then boosted towards HOB with min-mod A x 2. Restraints resecured. Pt will benefit from continued treatment to prevent further decline and promote ADL indep. OT will see patient 2-3x/week. OT recommends discharge to IP rehab pending progress and medical stability. Chair alarm activated. RN notified. Pt instructed to use call light when returning to bed. RN notified. Pt will benefit from continued treatment 2-3x/week at [facility]. Pending discharge recommendations pending progress and medical stability. PPE worn during session including gloves, eye shield and mask.

## 2020-08-05 NOTE — PROGRESS NOTES
[facility]/PULM/CC PROGRESS NOTE       HISTORY OF PRESENT ILLNESS:  Adolfo Alejandro is a 75 y.o. 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  Patient is with confusion, and no family is currently at bedside to provide further information regarding patient's clinical presentation.  Patient is confused, but is oriented to person and place.  Patient denies any gross complaints with the exception that he cannot breathe while on BiPAP.     In the ED, labs were obtained and as follows: Troponin <0.010, BNP 9541, glucose 108, CO2 33, WBC 6.3, hemoglobin 9.4, hematocrit 29.7.  ABG: pH 7.299, PCO2 75.1, PO2 67.0, HCO3 36.8, O2 saturation 89.6.  After ABG was obtained, patient was placed on BiPAP, in which a repeat ABG was obtained.  ABG: pH 7.290, PCO2 75.5, PaO2 112.7, HCO3 36.3, and O2 sat 97.5.  It was at this time, that patient was transitioned to AVAPS.  UA was obtained and negative.  Patient shows no evidence of infectious process at this time.  COVID-19 was checked and negative.     [facility] was contacted for admission to ICU and further evaluation and treatment.     INSPECT REPORT:  INSPECT report completed with most recent prescriptions of: Morphine sulfate extended release 15 mg tablet, 60 tablets for 30-day supply filled on 7/18/2020; oxycodone-acetaminophen  mg, 150 tablets for 30-day supply filled on 7/5/2020.       SUBJECTIVE    8/2: Confused and drowsy.  Cardene drip added.  Did not tolerate NIPPV last night.  Currently on 10 L high flow O2  8/3:  Remains on Cardene and a Precedex gtts. Confused.  5 liters of oxygen  8/4:  Off Cardene and Precedex gtts.  Remains confused and in restraints.  On 10 liters high flow nasal cannula.   8/5:  Remains confused and in restraints.  Sitter at  bedside.  On 10 Liters high flow nasal cannula.  Febrile     OBJECTIVE    Vitals:    08/05/20 0749 08/05/20 0750 08/05/20 0807 08/05/20 0814   BP:    102/54   Pulse: 80 80 81    Resp:       Temp:       TempSrc:       SpO2: 93% 95%     Weight:       Height:          Intake/Output last 3 shifts:  I/O last 3 completed shifts:  In: 240 [P.O.:240]  Out: -   Intake/Output this shift:  No intake/output data recorded.    PHYSICAL EXAM:       Constitutional:  Chronically ill appearing  Eyes:  PERRL, conjunctiva normal, EOMI  HENT:  Atraumatic, external ears normal, nose normal. Neck- normal range of motion, no tenderness, supple, trachea midline  Respiratory:  Clear to diminished bilaterally, non-labored respirations without accessory muscle use  Cardiovascular:  RRR, no murmurs, no gallops, no rubs   GI:  Soft, nondistended, normal bowel sounds, nontender, no rebound or guarding  :   Deferred  Musculoskeletal:  No edema, no clubbing or cyanosis  Integument:  Well hydrated, no rash   Neurologic:   Awake and Confused. Mumbles. No lateralizing deficits  Psychiatric:   Unable to evaluate    Scheduled Meds:    amiodarone 100 mg Oral Daily   amLODIPine 10 mg Oral Q24H   budesonide 0.5 mg Nebulization BID - RT   heparin (porcine) 5,000 Units Subcutaneous Q8H   ipratropium-albuterol 3 mL Nebulization 4x Daily - RT   lamoTRIgine 100 mg Oral Nightly   lamoTRIgine 200 mg Oral Daily   metoprolol tartrate 50 mg Oral Q12H   Morphine 15 mg Oral BID   PARoxetine 40 mg Oral Daily   piperacillin-tazobactam 4.5 g Intravenous Once   piperacillin-tazobactam 4.5 g Intravenous Q12H   QUEtiapine 50 mg Oral Nightly   sodium chloride 10 mL Intravenous Q12H       Continuous Infusions:    niCARdipine 2.5-15 mg/hr Last Rate: Stopped (08/03/20 2015)       PRN Meds:•  acetaminophen **OR** acetaminophen  •  aluminum-magnesium hydroxide-simethicone  •  bisacodyl  •  hydrALAZINE  •  ipratropium-albuterol  •  magnesium hydroxide  •  magnesium sulfate  **OR** magnesium sulfate in D5W 1g/100mL (PREMIX)  •  ondansetron **OR** ondansetron  •  oxyCODONE  •  potassium chloride **OR** potassium chloride **OR** potassium chloride  •  [COMPLETED] Insert peripheral IV **AND** sodium chloride  •  sodium chloride     LABS    CBC  Results from last 7 days   Lab Units 08/05/20  0334 08/04/20  0508 08/03/20  0416 08/02/20  1901 08/02/20  0332 08/01/20  0343 07/31/20  2255   WBC 10*3/mm3 22.20* 13.20* 14.00*  --  15.60* 6.00 6.30   RBC 10*6/mm3 4.27 3.88* 3.99*  --  4.03* 3.50* 3.27*   HEMOGLOBIN g/dL 12.0* 11.0* 11.3* 11.5* 11.5* 10.1* 9.4*   HEMATOCRIT % 38.0 34.5* 34.9* 35.5* 35.2* 31.3* 29.7*   MCV fL 89.0 88.8 87.5  --  87.5 89.3 90.7   PLATELETS 10*3/mm3 312 321 344  --  365 273 245       CMP   Results from last 7 days   Lab Units 08/05/20  0334 08/04/20  0508 08/03/20  1715 08/03/20  0416 08/02/20 1901 08/02/20  0332 08/01/20 0343 07/31/20  2255   SODIUM mmol/L 151* 149*  --  146*  --  142 139 140   POTASSIUM mmol/L 5.4* 3.7 3.3* 3.1* 3.2* 2.9* 3.7 4.3   CHLORIDE mmol/L 102 103  --  94*  --  87* 95* 98   CO2 mmol/L 33.0* 34.0*  --  38.0*  --  41.0* 37.0* 33.0*   BUN  71* 39*  --  35*  --  24* 19 20   CREATININE mg/dL 3.30* 1.30*  --  1.25  --  1.13 0.78 0.85   GLUCOSE mg/dL 151* 101*  --  134*  --  119* 104* 108*   ALBUMIN g/dL 3.40* 3.40*  --  3.80  --  4.20 3.90  --    BILIRUBIN mg/dL 0.7 0.6  --  0.8  --  0.9 0.4  --    ALK PHOS U/L 269* 113  --  121*  --  133* 114  --    AST (SGOT) U/L 118* 109*  --  63*  --  38 20  --    ALT (SGPT) U/L 47* 38  --  21  --  16 14  --        TROPONIN  Results from last 7 days   Lab Units 08/01/20 2002   TROPONIN T ng/mL <0.010       CoAg        ABG  Results from last 7 days   Lab Units 08/02/20  1257 08/02/20  0347 08/01/20  1043 08/01/20  0331 08/01/20  0008 07/31/20  2327   PH, ARTERIAL pH units 7.562* 7.576* 7.433 7.363 7.290* 7.299*   PCO2, ARTERIAL mm Hg 48.5* 51.1* 59.8* 70.1* 75.5* 75.1*   PO2 ART mm Hg 135.7* 81.2* 56.9*  59.8* 112.7* 67.0*   O2 SATURATION ART % 99.4* 97.2 88.9* 88.1* 97.5 89.6*   BASE EXCESS ART mmol/L 18.8* 22.3* 13.1* 12.0* 7.9* 8.5*       Microbiology  Microbiology Results (last 10 days)     Procedure Component Value - Date/Time    COVID-19, [facility] IN-HOUSE,NP Swab (NO TRANSPORT MEDIA) 2 HR TAT - Swab, Nasopharynx [[id number]]  (Normal) Collected:  08/01/20 0014    Lab Status:  Final result Specimen:  Swab from Nasopharynx Updated:  08/01/20 0040     COVID19 Not Detected    Narrative:       Fact sheet for providers: https://www.fda.gov/media/681248/download     Fact sheet for patients: https://www.fda.gov/media/861156/download          IMAGING & OTHER STUDIES    Imaging Results (Last 72 Hours)     Procedure Component Value Units Date/Time    XR Chest 1 View [[id number]] Collected:  08/04/20 0852     Updated:  08/04/20 0900    Narrative:       DATE OF EXAM:  8/4/2020 8:40 AM     PROCEDURE:  XR CHEST 1 VW-     INDICATIONS:  Increased shortness of breath. Emphysema. Aortic aneurysm.       COMPARISON:  AP portable chest 07/31/2020.     TECHNIQUE:   Single radiographic view of the chest was obtained.     FINDINGS:  Chronic interstitial fibrotic changes are present in the lung bases,  right greater than left, similar to prior examination. No  supraclavicular acute airspace disease is seen. Stable elevation right  hemidiaphragm. No pleural effusion or pneumothorax is identified. Table  mild cardiac enlargement, lateral for patient rotation toward the right.  CABG changes. Right arm approach PICC tip has been placed extending to  the upper right atrial level. Advanced degenerative changes of both  shoulders. Mild osteopenia.       Impression:       No acute airspace disease. Chronic interstitial fibrotic changes in the  lung bases.     Electronically Signed By-Dr. Carli Mason MD On:8/4/2020 8:58 AM  This report was finalized on [id number] by Dr. Carli Mason MD.    CT Angiogram Chest With & Without Contrast  [[id number]] Collected:  08/02/20 1044     Updated:  08/02/20 1054    Narrative:          DATE OF EXAM:  8/2/2020 9:12 AM     PROCEDURE:  CT ANGIOGRAM CHEST W WO CONTRAST-     INDICATIONS:   Aortic dz, non-traumatic, known or suspect; R06.00-Dyspnea, unspecified;  J44.1-Chronic obstructive pulmonary disease with (acute) exacerbation;  J96.21-Acute and chronic respiratory failure with hypoxia; J96.22-Acute  and chronic respiratory failure with hypercapnia; R41.82-Altered mental  status, unspecified     COMPARISON:   CT the chest with contrast PE protocol performed on 10/02/2017     TECHNIQUE:  Contiguous axial imaging was obtained from the thoracic inlet through  the upper abdomen following the intravenous administration of 100 mL  Isovue 370. Reconstructed coronal and sagittal images were also  obtained. In addition, a 3 D volume rendered image was obtained after  post processing. Automated exposure control and iterative reconstruction  methods were used.     FINDINGS:  VASCULAR FINDINGS: The present study reveals that there is an abnormal  dissection and aneurysm of the descending thoracic aorta. The dissection  starts just above the aortic valve and is proximal to the origin of the  brachiocephalic artery. The dissection measures 7.2 cm in length the  distal end of the dissection lies 4.5 cm proximal to the origin of the  brachycephalic artery. The descending thoracic aorta measures 5.2 cm in  diameter by 5.7 cm in diameter. Calcified atherosclerotic plaque can be  seen in the left coronary artery branches and in the right coronary  artery. The dissection does not appear to involve the right or left  coronary artery origins. The heart size normal. No evidence of  pericardial effusion. No evidence of hiatal hernia. No evidence of blood  in the mediastinum. No evidence of mass or adenopathy in the mediastinum  or in the right or left pulmonary tito. The patient is status post  sternotomy. No evidence of mass or  adenopathy in the base the neck or in  the periclavicular soft tissues or the axillary soft tissues. The  patient is status post cholecystectomy. There are surgical clips in the  gallbladder fossa. There is mild dilatation of the intrahepatic biliary  ducts and moderate dilatation of the common hepatic duct and common bile  duct. This is probably chronic and most likely acute. Correlation with  liver enzymes BE helpful. There is stent graft repair of abdominal  aortic aneurysm. No evidence of mass or adenopathy or ascites in the  abdomen. There are bilateral diffuse increased lung markings especially  in the lower lobes consistent with chronic lung disease. There are small  very small focal infiltrates in the lung bases bilaterally that might be  caused by inflammation or infection or scarring. No evidence of pleural  effusion or pneumothorax or mass in the right or left lungs.                Impression:          1. Dissection of the descending thoracic aorta that starts just above  the aortic valve and its distal and lies proximal to the origin of the  brachycephalic artery.  2. Aneurysmal dilatation of the ascending thoracic aorta  3. Very small focal infiltrates in the posterior lung bases bilaterally  might be caused by inflammation or infection.  4. Chronic lung disease.  5. Status post cholecystectomy. Dilatation of bile ducts might be acute  or chronic. Correlation with liver enzymes would BE helpful.     Electronically Signed By-DR. Keyon Laguerre MD On:8/2/2020 10:52  AM  This report was finalized on [id number] by DR. Keyon Laguerre MD.        Results for orders placed during the hospital encounter of 03/15/20   Adult Transthoracic Echo Complete W/ Cont if Necessary Per Protocol    Narrative · The following left ventricular wall segments are hypokinetic: mid   anterior, apical anterior, basal anterolateral, mid anterolateral, apical   lateral, basal inferolateral, mid inferolateral, apical  inferior, mid   inferior, apical septal, basal inferoseptal, mid inferoseptal, apex   hypokinetic, mid anteroseptal, basal anterior, basal inferior and basal   inferoseptal.  · Left ventricular systolic function is moderately decreased.  · Right ventricular cavity is borderline dilated.  · Left atrial cavity size is borderline dilated.  · Mild to moderate aortic valve regurgitation is present.             Acute respiratory failure with hypercapnia and hypoxia  -Likely multifactorial, with CHF/COPD exacerbation  -ABG and CXRs-reviewed  -Oxygen supplementation as needed, titrate FiO2 to maintain patient's oxygen saturations greater than 91%  -Currently on O2 10 L high flow.  BIPAP schedule pending ABG  -check ABG    Fever  -possible PNA, start Zosyn for possible aspiration    -sputum, blood, and urine cx ordered  -check urine antigens, resp viral panel  -check procal     Acute COPD exacerbation  -COVID-19 negative  -CXRs reviewed  -add IV steroids   -Continue Pulmicort twice daily, DuoNeb scheduled and as needed, Daliresp  -Recommend follow-up in outpatient pulmonary clinic    Shock-likely septic  -Now on Levophed  -Give additional IVF  -Trend Lactate  -Insert Art line    Aspiration PNA  -add IV Abx  -Send Procalcitonin, urine studies     Acute exacerbation of heart failure with reduced ejection fraction  -BNP 9541 on admission  -Continue scheduled diuresis, decreased to daily.  Monitor creatinine, currently 1.3  -Reviewed previous Echo, EF 44%    Dissection of the descending thoracic aorta  -CV surgery consulted, evaluated CT and CTA with the opinion that the dissection is isolated to the ascending aorta.  The flap looks a little bit thick possibly representing a chronic dissection.  The patient is a poor surgical candidate which was discussed by Dr. Isaacs with the patient's wife and surgery is not recommended.  Comanagement with strict blood pressure control.    -Cardene drip, now off.  Home meds resumed       Hypertensive urgency, history of essential hypertension  -Hold Losartan due to DEE.  Continue metoprolol.  Add Norvasc  -IV Lopressor x 1 dose given, no further BB 2/2 SB  -now hypotensive     Sinus bradycardia, resolved  -Hold amiodarone, metoprolol XL  -No evidence of chronic anticoagulation  -EKG reviewed     CAD, S/P CABG  -Troponin <0.010, monitor serial troponins, remained flat  -Lipid profile reviewed     Confusion/Altered mental status; H/O memory loss 2/2 TBI  -Only 1 SIRS criteria, normal WBC, afebrile  -UA negative  -UDS reviewed, positive for opiates  -OFF Precedex gtt     Hypernatremia   -  -add D5W at 50 ml/hr  -start free water per dietician recs    DEE  -consult to renal  -hold diuretics for now  -creatinine now 3.3  -check renal US   -bladder scan and eval for retention     ? Free air under diaphragm  -Get stat CT Abd/pelvis  -Follow lactic acid     Bipolar affective disorder  -Lamictal, Paxil, Hold  Seroquel  -Risperdal added    Chronic pain  -resume home pain medication   -monitor for withdrawal symptoms       Accuchecks with SSI  Code Status: CPR, Full Interventions  VTE Prophylaxis: Heparin/SCDs  PUD Prophylaxis: Pepcid D/C, could be contributing to delirium     D/C emery cath   NPO due to altered mental status, concern for aspiration.  DHT ordered - dietician for TF recs however hold until CT Abd is done.  PICC    Will place Art line.    Attending physician statement:  Patient remains critically ill.  Total critical care time spent is 32 minutes which does not include any time for procedures.  Critical care time is exclusive of time spent by the nurse practitioner.  Above note scribed by nurse practitioner for me and later reviewed by me for accuracy . I've examined the patient and reviewed all labs and images.  I have directly participated in the evaluation and management of this patient.  Macie Shaikh MD

## 2020-08-06 NOTE — PLAN OF CARE
Problem: Patient Care Overview  Goal: Plan of Care Review  Outcome: Ongoing (interventions implemented as appropriate)  Note:   Pt remains stable in bed. Vitals stable although pt is on levophed drip. Remains on 15L High flow nasal cannula. Pt periodically moans loudly as if in pain while grimacing. Gave pt pain medicine per order. No acute distress noted at this time. Will continue to monitor.

## 2020-08-06 NOTE — CONSULTS
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      Current Problems/Procedures:    Acute respiratory failure with hypercapnia and hypoxia  -Likely multifactorial, with CHF/COPD exacerbation  -Currently on O2 10 L high flow.  -may need AVAPS     Fever  -possible PNA, on antibx for possible aspiration.      Acute COPD exacerbation  -COVID-19 negative      Shock-likely septic  -on 1 pressor   -Given additional IVF 1.5 liters 8/5.  Limited IV fluids due to resp status and worsening creatinine.      Possible Aspiration PNA     AE heart failure with reduced ejection fraction  -diuresis as needed     Dissection of the descending thoracic aorta  -CV surgery consulted, evaluated CT and CTA with the opinion that the dissection is isolated to the ascending aorta.  The flap looks a little bit thick possibly representing a chronic dissection.  The patient is a poor surgical candidate     Hypertensive urgency  -now hypotensive      Confusion/Altered mental status; H/O memory loss 2/2 TBI  -Off precedex gtt     Hypernatremia, now hyponatremic       DEE  -renal following  -lasix gtt per renal  -high risk for HD     ? Free air under diaphragm  -CT Abd/pelvis reviewed   -ok for TF            Nutrition/Diet History                Nutrition/Diet History    Narrative     Pt remains confused & not appropriate for phone interview at this time. Per discussion in rounds, has a small-bore NGT in place. Pt will  "likely have to be put on AVAPs & may need a sitter  Pt has significant wt loss noted in hx. Unable to confirm intake hx prior to admission at this time. Patient is likely at risk for acute on chronic malnutrition. Given pt has been NPO for 5 days this stay & eating poorly (25%) when able to eat, plus weight loss. pt meets criteria for acute malnutrition.      Functional Status Recommending in patient rehab     Food Allergies NKFA     Factors Affecting Nutritional Intake AMS         Anthropometrics             Anthropometrics    Height 165.1 cm (65\")    Weight 56.7 kg (125 lb)    8/06/20 - noted significant change in wt since admission, question if 150 lb accurate       Admit Weight    Admit Weight 62.2 kg (137 lb) on 8/01/20 - 8.8% loss in 5 days      68 kg (150 lb) -?accuracy  7/31/20       Ideal Body Weight (IBW)    Ideal Body Weight (IBW) 136 lb    % Ideal Body Weight 92%       Usual Body Weight (UBW)    Usual Body Weight UTD    % Usual Body Weight UTD    Weight Hx  140 lb (3/23/20) - 10.7% loss in 5 months = significant  147 lb (3/18/20)  145 lb (3/15/20)  145 lb (12/6/19)  148 lb (11/25/19)  151 lb (4/22/19)  150 lb (12/13/18)  157 lb (5/2/18)         Body Mass Index (BMI)    BMI (kg/m2) 20.8           Labs/Medications                Labs    Pertinent Lab Results  Glucose 174 H/456 HH/132 H, Na+ 135 H, K+ 5.3 H, Crt 4.62 H, BUN 95 WNL, Ca 8.3 L, Alb 2.7 L, Phos 5 H, Mg 2.7 H, Hgb 10.1 L, Hct 32.7 L        Medications    Pertinent Medications Cefepime, colace, zyvox, solu-medrol, midodrine, miralax, lasix, roxicodone PRN         Physical Findings                Physical Findings    Overall Physical Appearance Unable to observe r/t limiting face-to-face contact in the context of COVID-19 pandemic     Edema  None documented     Gastrointestinal Last BM documented on 8/2 (x4 days ago)  Discussed in rounds, on bowel regimen     Tubes Small-bore NGT     Oral/Mouth Cavity No issues reported at baseline, noted that pt " "may have aspiration pna. Has not been evaluated by SLP this admit     Skin Intact         Estimated/Assessed Needs              Calorie Requirements     Height Used for Calorie Calculations  165.1 cm (65\")     Weight Used for Calorie Calculations 56.7 kg (125 lb)     Formula Chosen for Calorie Calculations  35 kcal/skg     Estimated Calorie Requirements (kcals/day) 1985 kcals/day             Protein Requirements    Weight Used For Protein Calculations 56.7 kg (125 lb)      Est Protein Requirement Amount (gms/kg) 1.5 gms/kg      Estimated Protein Requirements (gms/day) 85 gms/day         Fluid Requirements     Estimated Fluid Requirements (mL/day) Urine o/p+500 mL->DEE recs    Adjust based on hydration status           Fluid Deficit       Current Sodium Level (mEq/L)      Desired Sodium Level (mEq/L)      Estimated Fluid Deficit Needs (L)           Nutrition Prescription Ordered                Nutrition Prescription PO    Current PO Diet  NPO     Supplement -        Nutrition Prescription EN    Enteral Route Small-bore NGT     TF Modular -     TF Delivery Method -     Current Ordered TF  -     Current Ordered Water flush  -     TF Observation  -        Nutrition Prescription TPN    TPN Route -     Current Ordered TPN Volume  -     Dextrose (gm/kcals)  -     Amino Acid (gm/kcals) -     Lipids (mL/Concentration/Frequency)  -     TPN Observation  -          Evaluation of Received Nutrient/Fluid Intake                PO Evaluation    % PO Intake 25% x3 meals documented when able to eat on PO diet        EN Evaluation    TF Changes -     TF Residual -     TF Tolerance -     Average EN Delivered  -        TPN Evaluation    Total Number of Days on TPN  -           Clinical Course      Clinical Nutrition Course Details  PO Diet:  NPO 8/1 - 8/3  Healthy Heart 8/4  NPO 8/5 to present (8/6)    *Total of NPO x5 days throughout stay    EN Prescription:    TF to start 8/6         Problem/Interventions:                     " Nutrition Diagnoses Problem 1      Problem 1   Severe acute disease related malnutrition related to insufficient energy intake in the context of acute illnesses (pneumonia, AECOPD) as evidenced by <50% of estimated energy requirements x6 days and 8.8% loss of body weight in 5 days     Nutrition Diagnoses Problem 2      Problem 2 Inadequate oral intake r/t decreased ability to consume sufficient energy AEB NPO x5 days & 25% of meals consumed x1 day when able to safely eat           Intervention Goal                Intervention Goal    General Initiation of TF         Nutrition Intervention                Nutrition Intervention    RD/Tech Action Ordering EN support regimen         Nutrition Prescription                Nutrition Prescription PO      Diet  NPO     Supplement -        Nutrition Prescription EN    Enteral Prescription Initial goal conservative due to possible risk of RFS complicated by DEE (current hyperkalemia & hyperphosphatemia)    Novasource Renal to initial goal 25 mL/hr + 10 mL/hr H2O flush    End Goal:    Novasource Renal to 45 mL/hr x22 hrs (assumes interruptions for ADLs) provides 1980 kcal (100%), 90 gms of protein (106%), 713 mL free H2O+10 mL/hr H2O flush (x22 hrs = 220 mL/day) =933 mL free H2O+Rx flushes, IVF.        Nutrition Prescription TPN    TPN Prescription -         Monitor/Evaluation                Monitor/Evaluation    Monitor Initiation and advancement of EN support, tolerance with residuals, BMs, N/V, abd pain/distention, labs (electrolytes, BUN/Crt, glucose), wt for changes, skin integrity, at next encounter           Electronically signed by:  Vandana Wiley RD  08/06/20 09:03

## 2020-08-06 NOTE — DISCHARGE PLACEMENT REQUEST
"Adolfo Nassar (75 y.o. 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)   Code Status:  CPR    Ht:  165.1 cm (65\")   Wt:  56.7 kg (125 lb)    Admission Cmt:  None   Principal Problem:  None                Active Insurance as of 7/31/2020     Primary Coverage     Payor Plan Insurance Group Employer/Plan Group    HUMANA MEDICARE REPLACEMENT HUMANA MEDICARE REPLACEMENT [id number]     Payor Plan Address Payor Plan Phone Number Payor Plan Fax Number Effective Dates    [address] [address] [phone]  1/1/2018 - None Entered    [facility] [phone]       Subscriber Name Subscriber Birth Date Member ID       ADOLFO NASSAR 1945 [id number]                 Emergency Contacts      (Rel.) Home Phone Work Phone Mobile Phone    MALCOLM COPELAND (Sister) -- -- [phone]    FlaviaMerry (Spouse) [phone] -- [phone]              "

## 2020-08-06 NOTE — PROCEDURES
"Insert Arterial Line  Date/Time: 8/6/2020 6:44 AM  Performed by: Adama Trejo APRN  Authorized by: Adama Trejo APRN   Consent: Written consent obtained.  Risks and benefits: risks, benefits and alternatives were discussed  Consent given by: spouse  Patient understanding: patient states understanding of the procedure being performed  Patient consent: the patient's understanding of the procedure matches consent given  Procedure consent: procedure consent matches procedure scheduled  Relevant documents: relevant documents present and verified  Test results: test results available and properly labeled  Site marked: the operative site was marked  Imaging studies: imaging studies available  Required items: required blood products, implants, devices, and special equipment available  Patient identity confirmed: arm band, hospital-assigned identification number and anonymous protocol, patient vented/unresponsive  Time out: Immediately prior to procedure a \"time out\" was called to verify the correct patient, procedure, equipment, support staff and site/side marked as required.  Preparation: Patient was prepped and draped in the usual sterile fashion.  Indications: hemodynamic monitoring  Location: left radial  Anesthesia: local infiltration    Anesthesia:  Local Anesthetic: lidocaine 1% without epinephrine  Anesthetic total: 3 mL    Sedation:  Patient sedated: no    Andres's test normal: yes  Needle gauge: 20  Seldinger technique: Seldinger technique used  Number of attempts: 1  Post-procedure: line sutured and dressing applied  Post-procedure CMS: unchanged  Patient tolerance: Patient tolerated the procedure well with no immediate complications  Comments: Arterial waveform visualized on monitor with dicrotic notch.          DEEPTI Griffiths  [facility]  08/06/2020  "

## 2020-08-06 NOTE — PROGRESS NOTES
Malnutrition Severity Assessment    Patient Name:  Adolfo Alejandro  YOB: 1945  MRN: [id number]  Admit Date:  7/31/2020    Patient meets criteria for : Severe Malnutrition    Comments:  This RDN is unable to complete a nutrition-focused physical exam in the context of the COVID-19 pandemic. Per hospital policy, dietitians are not entering rooms. Suspect that malnutrition status is likely acute on chronic.     Severe acute disease related malnutrition related to insufficient energy intake in the context of acute illnesses (pneumonia, AECOPD) as evidenced by <50% of estimated energy requirements x6 days and 8.8% loss of body weight in 5 days     Nutrition Intervention:     Initiation of enteral nutrition support due to continued altered mental status and inability to safely consume PO diet. RDN will continue to follow throughout admission for appropriate interventions.    Malnutrition Severity Assessment  Malnutrition Type: Acute Disease or Injury - Related Malnutrition     Malnutrition Type (last 8 hours)      Malnutrition Severity Assessment     Row Name 08/06/20 1203       Malnutrition Severity Assessment    Malnutrition Type  Acute Disease or Injury - Related Malnutrition    Row Name 08/06/20 1203       Insufficient Energy Intake     Insufficient Energy Intake Findings  Severe    Insufficient Energy Intake   < or equal to 50% of est. energy requirement for > or equal to 5d) Patient has been NPO for 5 of 6 days this hospital stay, He ate an average of 25% of his meals over 1 day    Row Name 08/06/20 1203       Unintentional Weight Loss     Unintentional Weight Loss Findings  Severe    Unintentional Weight Loss   Weight loss greater than 2% in one week CBW: 125 lb, weight history of 137 lb on 8/1/20. 8.8% loss in 5 days    Row Name 08/06/20 1203       Criteria Met (Must meet criteria for severity in at least 2 of these categories: M Wasting, Fat Loss, Fluid, Secondary Signs, Wt. Status, Intake)     Patient meets criteria for   Severe Malnutrition          Electronically signed by:  Vandana Wiley RD  08/06/20 12:05

## 2020-08-06 NOTE — NURSING NOTE
The patient's oldest son, Michael was  Present today, and did leave his phone number in case his mother was unreachable. Michael's phone number is: [phone].

## 2020-08-06 NOTE — PROGRESS NOTES
[facility]/PULM/CC PROGRESS NOTE       HISTORY OF PRESENT ILLNESS:  Adolfo Alejandro is a 75 y.o. 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  Patient is with confusion, and no family is currently at bedside to provide further information regarding patient's clinical presentation.  Patient is confused, but is oriented to person and place.  Patient denies any gross complaints with the exception that he cannot breathe while on BiPAP.     In the ED, labs were obtained and as follows: Troponin <0.010, BNP 9541, glucose 108, CO2 33, WBC 6.3, hemoglobin 9.4, hematocrit 29.7.  ABG: pH 7.299, PCO2 75.1, PO2 67.0, HCO3 36.8, O2 saturation 89.6.  After ABG was obtained, patient was placed on BiPAP, in which a repeat ABG was obtained.  ABG: pH 7.290, PCO2 75.5, PaO2 112.7, HCO3 36.3, and O2 sat 97.5.  It was at this time, that patient was transitioned to AVAPS.  UA was obtained and negative.  Patient shows no evidence of infectious process at this time.  COVID-19 was checked and negative.     [facility] was contacted for admission to ICU and further evaluation and treatment.     INSPECT REPORT:  INSPECT report completed with most recent prescriptions of: Morphine sulfate extended release 15 mg tablet, 60 tablets for 30-day supply filled on 7/18/2020; oxycodone-acetaminophen  mg, 150 tablets for 30-day supply filled on 7/5/2020.       SUBJECTIVE    8/2: Confused and drowsy.  Cardene drip added.  Did not tolerate NIPPV last night.  Currently on 10 L high flow O2  8/3:  Remains on Cardene and a Precedex gtts. Confused.  5 liters of oxygen  8/4:  Off Cardene and Precedex gtts.  Remains confused and in restraints.  On 10 liters high flow nasal cannula.   8/5:  Remains confused and in restraints.  Sitter at  bedside.  On 10 Liters high flow nasal cannula.  Febrile   8/6:  Remains confused and on 10 liters nasal cannula.  On Levophed     OBJECTIVE    Vitals:    08/06/20 0706 08/06/20 0709 08/06/20 0811 08/06/20 0812   BP:       Pulse: 59 65 54 54   Resp: 24 24     Temp:   97.5 °F (36.4 °C)    TempSrc:   Axillary    SpO2: 100% 99%     Weight:       Height:          Intake/Output last 3 shifts:  I/O last 3 completed shifts:  In: 3277 [P.O.:120; I.V.:3057; IV Piggyback:100]  Out: 200 [Urine:200]  Intake/Output this shift:  No intake/output data recorded.    PHYSICAL EXAM:       Constitutional:  Chronically ill appearing  Eyes:  PERRL, conjunctiva normal, EOMI  HENT:  Atraumatic, external ears normal, nose normal. Neck- normal range of motion, no tenderness, supple, trachea midline  Respiratory:  Clear to diminished bilaterally, non-labored respirations without accessory muscle use  Cardiovascular:  RRR, no murmurs, no gallops, no rubs   GI:  Soft, nondistended, normal bowel sounds, nontender, no rebound or guarding  :   Deferred  Musculoskeletal:  No edema, no clubbing or cyanosis  Integument:  Well hydrated, no rash   Neurologic:   Awake and Confused. Mumbles. No lateralizing deficits  Psychiatric:   Unable to evaluate    Scheduled Meds:    amLODIPine 10 mg Oral Q24H   budesonide 0.5 mg Nebulization BID - RT   heparin (porcine) 5,000 Units Subcutaneous Q8H   ipratropium-albuterol 3 mL Nebulization 4x Daily - RT   lamoTRIgine 100 mg Oral Nightly   lamoTRIgine 200 mg Oral Daily   Linezolid 600 mg Intravenous Q12H   methylPREDNISolone sodium succinate 40 mg Intravenous Q8H   metoprolol tartrate 50 mg Oral Q12H   [START ON 8/12/2020] PARoxetine 40 mg Oral Daily   piperacillin-tazobactam 4.5 g Intravenous Q12H   sodium chloride 10 mL Intravenous Q12H       Continuous Infusions:    dextrose 75 mL/hr Last Rate: 75 mL/hr (08/05/20 1829)   furosemide (LASIX) infusion 1 mg/mL 20 mg/hr Last Rate: 20 mg/hr (08/06/20 5767)      norepinephrine 0.02-0.3 mcg/kg/min Last Rate: 0.18 mcg/kg/min (08/05/20 2158)       PRN Meds:•  acetaminophen **OR** acetaminophen  •  aluminum-magnesium hydroxide-simethicone  •  bisacodyl  •  dextrose  •  hydrALAZINE  •  ipratropium-albuterol  •  magnesium hydroxide  •  magnesium sulfate **OR** magnesium sulfate in D5W 1g/100mL (PREMIX)  •  ondansetron **OR** ondansetron  •  oxyCODONE  •  potassium chloride **OR** potassium chloride **OR** potassium chloride  •  [COMPLETED] Insert peripheral IV **AND** sodium chloride  •  sodium chloride     LABS    CBC  Results from last 7 days   Lab Units 08/06/20  0521 08/05/20  1137 08/05/20  0334 08/04/20  0508 08/03/20  0416 08/02/20  1901 08/02/20  0332 08/01/20  0343 07/31/20  2255   WBC 10*3/mm3 10.60  --  22.20* 13.20* 14.00*  --  15.60* 6.00 6.30   RBC 10*6/mm3 3.55*  --  4.27 3.88* 3.99*  --  4.03* 3.50* 3.27*   HEMOGLOBIN g/dL 10.1*  --  12.0* 11.0* 11.3* 11.5* 11.5* 10.1* 9.4*   HEMOGLOBIN, POC g/dL  --  13.8  --   --   --   --   --   --   --    HEMATOCRIT % 32.7*  --  38.0 34.5* 34.9* 35.5* 35.2* 31.3* 29.7*   HEMATOCRIT POC %  --  41  --   --   --   --   --   --   --    MCV fL 92.1  --  89.0 88.8 87.5  --  87.5 89.3 90.7   PLATELETS 10*3/mm3 191  --  312 321 344  --  365 273 245       CMP   Results from last 7 days   Lab Units 08/06/20  0521 08/05/20  1607 08/05/20  0334 08/04/20  0508 08/03/20  1715 08/03/20  0416 08/02/20  1901 08/02/20  0332 08/01/20  0343   SODIUM mmol/L 135* 149* 151* 149*  --  146*  --  142 139   POTASSIUM mmol/L 5.3* 6.2* 5.4* 3.7 3.3* 3.1* 3.2* 2.9* 3.7   CHLORIDE mmol/L 92* 102 102 103  --  94*  --  87* 95*   CO2 mmol/L 27.0 31.0* 33.0* 34.0*  --  38.0*  --  41.0* 37.0*   BUN  95* 63* 71* 39*  --  35*  --  24* 19   CREATININE mg/dL 4.62* 4.52* 3.30* 1.30*  --  1.25  --  1.13 0.78   GLUCOSE mg/dL 456* 287* 151* 101*  --  134*  --  119* 104*   ALBUMIN g/dL 2.70*  --  3.40* 3.40*  --  3.80  --  4.20 3.90   BILIRUBIN mg/dL 0.7  --  0.7 0.6   --  0.8  --  0.9 0.4   ALK PHOS U/L 462*  --  269* 113  --  121*  --  133* 114   AST (SGOT) U/L 539*  --  118* 109*  --  63*  --  38 20   ALT (SGPT) U/L 225*  --  47* 38  --  21  --  16 14       TROPONIN  Results from last 7 days   Lab Units 08/01/20  2002   TROPONIN T ng/mL <0.010       CoAg        ABG  Results from last 7 days   Lab Units 08/05/20  1137 08/02/20  1257 08/02/20  0347 08/01/20  1043 08/01/20  0331 08/01/20  0008 07/31/20  2327   PH, ARTERIAL pH units 7.418 7.562* 7.576* 7.433 7.363 7.290* 7.299*   PCO2, ARTERIAL mm Hg 48.6* 48.5* 51.1* 59.8* 70.1* 75.5* 75.1*   PO2 ART mm Hg 69.1* 135.7* 81.2* 56.9* 59.8* 112.7* 67.0*   O2 SATURATION ART % 93.6* 99.4* 97.2 88.9* 88.1* 97.5 89.6*   BASE EXCESS ART mmol/L 5.6* 18.8* 22.3* 13.1* 12.0* 7.9* 8.5*       Microbiology  Microbiology Results (last 10 days)     Procedure Component Value - Date/Time    Legionella Antigen, Urine - Urine, Urine, Clean Catch [[id number]]  (Normal) Collected:  08/05/20 1119    Lab Status:  Final result Specimen:  Urine, Clean Catch Updated:  08/05/20 1224     LEGIONELLA ANTIGEN, URINE Negative    S. Pneumo Ag Urine or CSF - Urine, Urine, Clean Catch [[id number]]  (Abnormal) Collected:  08/05/20 1119    Lab Status:  Final result Specimen:  Urine, Clean Catch Updated:  08/05/20 1225     Strep Pneumo Ag Positive    MRSA Screen, PCR (Inpatient) - Swab, Nares [[id number]]  (Abnormal) Collected:  08/05/20 1115    Lab Status:  Final result Specimen:  Swab from Nares Updated:  08/05/20 1301     MRSA PCR MRSA Detected    Respiratory Panel, PCR - Swab, Nasopharynx [[id number]]  (Normal) Collected:  08/05/20 1115    Lab Status:  Final result Specimen:  Swab from Nasopharynx Updated:  08/05/20 1237     ADENOVIRUS, PCR Not Detected     Coronavirus 229E Not Detected     Coronavirus HKU1 Not Detected     Coronavirus NL63 Not Detected     Coronavirus OC43 Not Detected     Human Metapneumovirus Not Detected     Human Rhinovirus/Enterovirus Not Detected      Influenza B PCR Not Detected     Parainfluenza Virus 1 Not Detected     Parainfluenza Virus 2 Not Detected     Parainfluenza Virus 3 Not Detected     Parainfluenza Virus 4 Not Detected     Bordetella pertussis pcr Not Detected     Influenza A H1 2009 PCR Not Detected     Chlamydophila pneumoniae PCR Not Detected     Mycoplasma pneumo by PCR Not Detected     Influenza A PCR Not Detected     Influenza A H3 Not Detected     Influenza A H1 Not Detected     RSV, PCR Not Detected     Bordetella parapertussis PCR Not Detected    Narrative:       The coronavirus on the RVP is NOT COVID-19 and is NOT indicative of infection with COVID-19.     COVID-19, ABBOTT IN-HOUSE,NP Swab (NO TRANSPORT MEDIA) 2 HR TAT - Swab, Nasopharynx [[id number]]  (Normal) Collected:  08/01/20 0014    Lab Status:  Final result Specimen:  Swab from Nasopharynx Updated:  08/01/20 0040     COVID19 Not Detected    Narrative:       Fact sheet for providers: https://www.fda.gov/media/984089/download     Fact sheet for patients: https://www.fda.gov/media/524265/download          IMAGING & OTHER STUDIES    Imaging Results (Last 72 Hours)     Procedure Component Value Units Date/Time    CT Abdomen Pelvis Without Contrast [[id number]] Collected:  08/05/20 2151     Updated:  08/05/20 2156    Narrative:       CT ABDOMEN PELVIS WO CONTRAST-     Date of Exam: 8/5/2020 9:36 PM     Indication: Bowel perforation; R06.00-Dyspnea, unspecified;  J44.1-Chronic obstructive pulmonary disease with (acute) exacerbation;  J96.21-Acute and chronic respiratory failure with hypoxia; J96.22-Acute  and chronic respiratory failure with hypercapnia; R41.82-Altered mental  status, unspecified.     Comparison: CT 08/24/2011, radiograph 08/05/2020     Technique: Contiguous axial CT images were obtained from the lung bases  to the pubic symphysis without contrast. Sagittal and coronal  reconstructions were performed.  Automated exposure control and  iterative reconstruction methods  were used.     FINDINGS:     Lower Thorax: Airspace consolidations are present within the bilateral  lower lobes with air bronchograms present. The heart appears mildly  enlarged. Extensive atherosclerotic calcifications are present. The  aorta appears tortuous. Evaluation is limited due to lack of intravenous  contrast. No definite adenopathy identified.     Limited evaluation of the solid organs due to lack of intravenous  contrast.     Liver: Normal size and density. No focal lesions identified.     Gallbladder: The gallbladder appears normal in size with no evidence of  radiopaque gallstones. The biliary tree is nondilated.     Pancreas: No focal masses.  No pancreatic duct dilation.     Spleen: Spleen is normal size.  No focal splenic lesions.     Adrenal glands: Unremarkable.      Kidneys: The kidneys appear normal in size. No evidence of  nephrolithiasis. No evidence of hydronephrosis. No suspicious focal  lesions identified.     Retroperitoneum/vascular: No retroperitoneal adenopathy identified.  Postsurgical changes are seen from aortic biiliac stent graft repair.  The stent is not well evaluated due to lack of intravenous contrast.     Stomach and Bowel: No abnormally dilated loops of bowel no definite mass  identified. There is mild gaseous distention of the colon. Large stool  burden is present, most pronounced within the rectum. No significant  bowel wall thickening. No free fluid. No focal fluid collections  identified. The appendix is not well visualized. No mesenteric  adenopathy identified. No evidence of free air.     Bladder: The bladder appears unremarkable.     Pelvic Organs: No acute process identified.     Osseous structures: No aggressive focal lytic or sclerotic osseous  lesions. No acute osseous abnormality. Total bilateral hip  arthroplasties are present limiting evaluation of the medially adjacent  structures due to streak artifact. Kyphoplasty changes are present  within the spine.  Extensive degenerative changes are present throughout  the spine. Bilateral pars defects are present in L5 with mild  anterolisthesis present at this level.          Impression:          1. No acute intra-abdominal or pelvic process. No evidence of free air.  There is a large stool burden present throughout the colon, most  pronounced within the rectum, likely related to  constipation/obstipation. There is mild gaseous distention of the colon  seen proximally.  2. Patchy airspace consolidations within the bilateral lower lobes  consistent with pneumonia.  3. Ancillary findings as described above.           Electronically Signed By-Jackie Morejon On:8/5/2020 9:53 PM  This report was finalized on [id number] by  Jackie Morejon, .    US Renal Bilateral [[id number]] Collected:  08/05/20 1540     Updated:  08/05/20 1548    Narrative:       DATE OF EXAM:  8/5/2020 2:21 PM     PROCEDURE:  US RENAL BILATERAL-     INDICATIONS:  DEE, r/o hydronephrosis; R06.00-Dyspnea, unspecified; J44.1-Chronic  obstructive pulmonary disease with (acute) exacerbation; J96.21-Acute  and chronic respiratory failure with hypoxia; J96.22-Acute and chronic  respiratory failure with hypercapnia; R41.82-Altered mental status,  unspecified     COMPARISON:  CTA chest 08/02/2020, renal ultrasound 05/14/2017.     TECHNIQUE:   Grayscale and color Doppler ultrasound evaluation of the kidneys and  urinary bladder was performed.     FINDINGS:  Technologist noted a technically difficult exam due to limited patient  mobility and cooperation.     The right kidney measures approximately 8.7 x 4.1 x 6.0 cm, and the left  kidney measures approximately 8.7 x 4.8 x 5.6 cm. Renal cortical  echogenicity appears within normal limits bilaterally. No renal mass or  hydronephrosis is seen. The urinary bladder is not visualized, as it is  decompressed with a Tripp catheter in place.        Impression:       1.Technically difficult exam showing relatively small bilateral  renal  size.  2.No hydronephrosis.     Electronically Signed By-Karla Marcelino On:8/5/2020 3:46 PM  This report was finalized on [id number] by  Karla Marcelino, .    XR Abdomen KUB [[id number]] Collected:  08/05/20 1344     Updated:  08/05/20 1406    Narrative:       DATE OF EXAM:  8/5/2020 1:15 PM     PROCEDURE:  XR ABDOMEN KUB-     INDICATIONS:  Dobbhoff placement.     COMPARISON:  KUB 09/06/2014.     TECHNIQUE:   Single radiographic view of the abdomen was obtained.        FINDINGS:  The radiopaque tip of the Dobbhoff tube projects into the proximal  gastric body region.     There is moderate gaseous distention of upper abdominal bowel loops,  thought to represent the colon. Question is raised of free  intraperitoneal air underneath the diaphragmatic margins. Surgical chain  sutures in the right upper quadrant. Aortobiiliac endograft repair.  Vertebroplasty changes at T12. Osteopenia. Bilateral hip replacements.  Large stool burden in the region of the rectum. Presumed Tripp catheter  in the  Pelvis.       Impression:          1. Lucency is seen in the upper abdomen under each diaphragmatic margin.  This could be artifactual related to air within colon immediately  adjacent to the diaphragm. However, I cannot exclude the presence of  free intraperitoneal air on this examination. Consider left lateral  decubitus abdomen plain film or CT abdomen for further evaluation. I  placed a page to discussed the case with Dr. Shaikh at the time of this  dictation.  2. Dobbhoff tube tip projects to the proximal gastric body region.  3. Large rectal stool burden.     Electronically Signed By-Dr. Carli Mason MD On:8/5/2020 2:04 PM  This report was finalized on [id number] by Dr. Carli Mason MD.    XR Chest 1 View [[id number]] Collected:  08/04/20 0852     Updated:  08/04/20 0900    Narrative:       DATE OF EXAM:  8/4/2020 8:40 AM     PROCEDURE:  XR CHEST 1 VW-     INDICATIONS:  Increased shortness of breath. Emphysema.  Aortic aneurysm.       COMPARISON:  AP portable chest 07/31/2020.     TECHNIQUE:   Single radiographic view of the chest was obtained.     FINDINGS:  Chronic interstitial fibrotic changes are present in the lung bases,  right greater than left, similar to prior examination. No  supraclavicular acute airspace disease is seen. Stable elevation right  hemidiaphragm. No pleural effusion or pneumothorax is identified. Table  mild cardiac enlargement, lateral for patient rotation toward the right.  CABG changes. Right arm approach PICC tip has been placed extending to  the upper right atrial level. Advanced degenerative changes of both  shoulders. Mild osteopenia.       Impression:       No acute airspace disease. Chronic interstitial fibrotic changes in the  lung bases.     Electronically Signed By-Dr. Carli Mason MD On:8/4/2020 8:58 AM  This report was finalized on [id number] by Dr. Carli Mason MD.        Results for orders placed during the hospital encounter of 03/15/20   Adult Transthoracic Echo Complete W/ Cont if Necessary Per Protocol    Narrative · The following left ventricular wall segments are hypokinetic: mid   anterior, apical anterior, basal anterolateral, mid anterolateral, apical   lateral, basal inferolateral, mid inferolateral, apical inferior, mid   inferior, apical septal, basal inferoseptal, mid inferoseptal, apex   hypokinetic, mid anteroseptal, basal anterior, basal inferior and basal   inferoseptal.  · Left ventricular systolic function is moderately decreased.  · Right ventricular cavity is borderline dilated.  · Left atrial cavity size is borderline dilated.  · Mild to moderate aortic valve regurgitation is present.             Acute respiratory failure with hypercapnia and hypoxia  -Likely multifactorial, with CHF/COPD exacerbation  -ABG and CXRs-reviewed  -Oxygen supplementation as needed, titrate FiO2 to maintain patient's oxygen saturations greater than 91%  -Currently on O2 10 L  high flow.  ABGs reviewed   -check another ABG    Fever  -possible PNA, start Zosyn for possible aspiration.  Change Zosyn to Cefepime.  Cont Zyvox    -sputum, blood, and urine cx ordered  -check urine antigens, resp viral panel  -procal 6     Acute COPD exacerbation  -COVID-19 negative  -CXRs reviewed  -on IV steroids   -Continue Pulmicort twice daily, DuoNeb scheduled and as needed, Daliresp  -Recommend follow-up in outpatient pulmonary clinic    Shock-likely septic  -Levophed, MAP goal 65   -Given additional IVF 1.5 liters 8/5.  Limited IV fluids due to resp status and worsening creatinine.  BNP 9541  -lactate 2  -add Midodrine    Possible Aspiration PNA, strep pneumoniae POSITIVE  -on Zosyn and Zyvox.  Change Zosyn to Cefepime   -procal 6  -urine strep pneumoniae POSITIVE     Acute exacerbation of heart failure with reduced ejection fraction  -BNP 9541 on admission  -Continue scheduled diuresis, decreased to daily.  Monitor creatinine, currently 4.62  -Reviewed previous Echo, EF 44%    Dissection of the descending thoracic aorta  -CV surgery consulted, evaluated CT and CTA with the opinion that the dissection is isolated to the ascending aorta.  The flap looks a little bit thick possibly representing a chronic dissection.  The patient is a poor surgical candidate which was discussed by Dr. Isaacs with the patient's wife and surgery is not recommended.  Comanagement with strict blood pressure control.    -Cardene drip, now off.  Home meds resumed      Hypertensive urgency, history of essential hypertension  -D/C Losartan due to DEE.  D/C metoprolol and Norvasc  -IV Lopressor x 1 dose given, no further BB 2/2 SB  -now hypotensive     Sinus bradycardia, resolved  -Hold amiodarone and metoprolol XL  -No evidence of chronic anticoagulation  -EKG reviewed     CAD, S/P CABG  -Troponin <0.010, monitor serial troponins, remained flat  -Lipid profile reviewed     Confusion/Altered mental status; H/O memory loss 2/2  TBI  -Only 1 SIRS criteria, normal WBC, afebrile  -UA negative  -UDS reviewed, positive for opiates  -OFF Precedex gtt     Hypernatremia   -  -on D5W at 50 ml/hr  -free water if needed per dietician     DEE  -consult to renal  -lasix gtt per renal  -creatinine 4.62  -renal US - no hydronephrosis   -emery cath in place  -high risk for HD    ? Free air under diaphragm  -CT Abd/pelvis reviewed   -lactic 2, improved      Bipolar affective disorder  -Lamictal, cont  -hold Paxil and Seroquel  -Risperdal d/c    Chronic pain  -resume home pain medication   -monitor for withdrawal symptoms       Accuchecks with SSI  Code Status: CPR, Full Interventions  VTE Prophylaxis: Heparin/SCDs  PUD Prophylaxis: Pepcid D/C, could be contributing to delirium     Add Bowel regimen - stool burden on CT abd    Emery cath   Feeding tube  PICC    Arterial line, radial    Attending physician statement:  High risk   Above note scribed by nurse practitioner for me and later reviewed for accuracy. I've examined the patient and reviewed all labs and images.   I have directly participated in the evaluation and management of this patient.  Robbie Holden MD  Pulmonary and CCM  [facility]

## 2020-08-06 NOTE — PROGRESS NOTES
Continued Stay Note  [facility]     Patient Name: Adolfo Alejandro  MRN: [id number]  Today's Date: 8/6/2020    Admit Date: 7/31/2020    Discharge Plan     Row Name 08/06/20 1136       Plan    Plan  New referral for [facility] (sent via [employer] and Maryam notified), for when patient is medically ready.     Provided Post Acute Provider List?  Yes    Post Acute Provider List  Inpatient Rehab    Delivered To  Patient    Method of Delivery  Telephone    Patient/Family in Agreement with Plan  yes    Plan Comments  Spoke with patient's wife via telephone. Choices given. Patient has actually been to [facility] in the past, Second choice is [facility] where he has been as well. Barriers to discharge: patient is on 10 L high flow at this time, art line, septic, pneumonia. Not medically ready for discharge at this time.           Expected Discharge Date and Time     Expected Discharge Date Expected Discharge Time    Aug 17, 2020               Anna Nae[place]le [place] [address]   [phone]  office  [phone]  fax  [email or web address]

## 2020-08-06 NOTE — PROGRESS NOTES
"                                                                                                                                      Nephrology  Progress Note                                        [facility]    Patient Identification    Name: Adolfo Alejandro  Age: 75 y.o.  Sex: male  :  1945  MRN: [id number]      DATE OF SERVICE:  2020        Subective    Still on a lot of oxygen  On Lasix drip  No urine output     Objective   Scheduled Meds:  amLODIPine 10 mg Oral Q24H   budesonide 0.5 mg Nebulization BID - RT   heparin (porcine) 5,000 Units Subcutaneous Q8H   ipratropium-albuterol 3 mL Nebulization 4x Daily - RT   lamoTRIgine 100 mg Oral Nightly   lamoTRIgine 200 mg Oral Daily   Linezolid 600 mg Intravenous Q12H   methylPREDNISolone sodium succinate 40 mg Intravenous Q8H   metoprolol tartrate 50 mg Oral Q12H   [START ON 2020] PARoxetine 40 mg Oral Daily   piperacillin-tazobactam 4.5 g Intravenous Q12H   sodium chloride 10 mL Intravenous Q12H         Continuous Infusions:  dextrose 75 mL/hr Last Rate: 75 mL/hr (20)   furosemide (LASIX) infusion 1 mg/mL 20 mg/hr Last Rate: 20 mg/hr (20)   niCARdipine 2.5-15 mg/hr Last Rate: Stopped (20)   norepinephrine 0.02-0.3 mcg/kg/min Last Rate: 0.18 mcg/kg/min (20)   vasopressin 0.03 Units/min Last Rate: Stopped (20 1504)       PRN Meds:•  acetaminophen **OR** acetaminophen  •  aluminum-magnesium hydroxide-simethicone  •  bisacodyl  •  dextrose  •  hydrALAZINE  •  ipratropium-albuterol  •  magnesium hydroxide  •  magnesium sulfate **OR** magnesium sulfate in D5W 1g/100mL (PREMIX)  •  ondansetron **OR** ondansetron  •  oxyCODONE  •  potassium chloride **OR** potassium chloride **OR** potassium chloride  •  [COMPLETED] Insert peripheral IV **AND** sodium chloride  •  sodium chloride     Exam:  /42   Pulse 65   Temp 96.9 °F (36.1 °C) (Axillary)   Resp 24   Ht 165.1 cm (65\")   Wt " 56.7 kg (125 lb)   SpO2 99%   BMI 20.80 kg/m²     Intake/Output last 3 shifts:  I/O last 3 completed shifts:  In: 2343 [P.O.:120; I.V.:2123; IV Piggyback:100]  Out: 50 [Urine:50]    Intake/Output this shift:  No intake/output data recorded.    Physical exam:  General Appearance: On BiPAP  Head:  Normocephalic, without obvious abnormality, atraumatic  Eyes:  PERRL, conjunctiva/corneas clear     Neck:  Supple,  no adenopathy;      Lungs:  Decreased BS occasion ronchi  Heart:  Regular rate and rhythm, S1 and S2 normal  Abdomen:  Soft, non-tender, bowel sounds active   Extremities: trace edema  Pulses: 2+ and symmetric all extremities  Skin:  No rashes or lesions       Data Review:  All labs (24hrs):   Recent Results (from the past 24 hour(s))   Procalcitonin    Collection Time: 08/05/20 11:07 AM   Result Value Ref Range    Procalcitonin 6.00 (H) 0.00 - 0.25 ng/mL   MRSA Screen, PCR (Inpatient) - Swab, Nares    Collection Time: 08/05/20 11:15 AM   Result Value Ref Range    MRSA PCR MRSA Detected (A) No MRSA Detected   Respiratory Panel, PCR - Swab, Nasopharynx    Collection Time: 08/05/20 11:15 AM   Result Value Ref Range    ADENOVIRUS, PCR Not Detected Not Detected    Coronavirus 229E Not Detected Not Detected    Coronavirus HKU1 Not Detected Not Detected    Coronavirus NL63 Not Detected Not Detected    Coronavirus OC43 Not Detected Not Detected    Human Metapneumovirus Not Detected Not Detected    Human Rhinovirus/Enterovirus Not Detected Not Detected    Influenza B PCR Not Detected Not Detected    Parainfluenza Virus 1 Not Detected Not Detected    Parainfluenza Virus 2 Not Detected Not Detected    Parainfluenza Virus 3 Not Detected Not Detected    Parainfluenza Virus 4 Not Detected Not Detected    Bordetella pertussis pcr Not Detected Not Detected    Influenza A H1 2009 PCR Not Detected Not Detected    Chlamydophila pneumoniae PCR Not Detected Not Detected    Mycoplasma pneumo by PCR Not Detected Not Detected     Influenza A PCR Not Detected Not Detected    Influenza A H3 Not Detected Not Detected    Influenza A H1 Not Detected Not Detected    RSV, PCR Not Detected Not Detected    Bordetella parapertussis PCR Not Detected Not Detected   Urinalysis With Culture If Indicated - Urine, Clean Catch    Collection Time: 08/05/20 11:18 AM   Result Value Ref Range    Color, UA Dark Yellow (A) Yellow, Straw    Appearance, UA Cloudy (A) Clear    pH, UA 7.5 5.0 - 8.0    Specific Gravity, UA 1.021 1.005 - 1.030    Glucose, UA Negative Negative    Ketones, UA Negative Negative    Bilirubin, UA Small (1+) (A) Negative    Blood, UA Small (1+) (A) Negative    Protein, UA 30 mg/dL (1+) (A) Negative    Leuk Esterase, UA Moderate (2+) (A) Negative    Nitrite, UA Negative Negative    Urobilinogen, UA 1.0 E.U./dL 0.2 - 1.0 E.U./dL   Urinalysis, Microscopic Only - Urine, Clean Catch    Collection Time: 08/05/20 11:18 AM   Result Value Ref Range    RBC, UA 0-2 (A) None Seen /HPF    WBC, UA 13-20 (A) None Seen /HPF    Bacteria, UA Trace (A) None Seen /HPF    Squamous Epithelial Cells, UA None Seen None Seen, 0-2 /HPF    Hyaline Casts, UA None Seen None Seen /LPF    Methodology Manual Light Microscopy    Legionella Antigen, Urine - Urine, Urine, Clean Catch    Collection Time: 08/05/20 11:19 AM   Result Value Ref Range    LEGIONELLA ANTIGEN, URINE Negative Negative   S. Pneumo Ag Urine or CSF - Urine, Urine, Clean Catch    Collection Time: 08/05/20 11:19 AM   Result Value Ref Range    Strep Pneumo Ag Positive (A) Negative   Sodium, Urine, Random - Urine, Clean Catch    Collection Time: 08/05/20 11:19 AM   Result Value Ref Range    Sodium, Urine <20 mmol/L   Creatinine, Urine, Random - Urine, Clean Catch    Collection Time: 08/05/20 11:19 AM   Result Value Ref Range    Creatinine, Urine 92.6 mg/dL   Protein, Urine, Random - Urine, Clean Catch    Collection Time: 08/05/20 11:19 AM   Result Value Ref Range    Total Protein, Urine 86.3 mg/dL   Blood Gas,  Arterial    Collection Time: 08/05/20 11:37 AM   Result Value Ref Range    Site Right Brachial     Andres's Test Positive     pH, Arterial 7.418 7.350 - 7.450 pH units    pCO2, Arterial 48.6 (H) 35.0 - 48.0 mm Hg    pO2, Arterial 69.1 (L) 83.0 - 108.0 mm Hg    HCO3, Arterial 31.3 (H) 21.0 - 28.0 mmol/L    Base Excess, Arterial 5.6 (H) 0.0 - 3.0 mmol/L    O2 Saturation, Arterial 93.6 (L) 94.0 - 98.0 %    CO2 Content 32.8 (H) 22 - 29 mmol/L    Barometric Pressure for Blood Gas      Modality HFNC     FIO2 <21 %    Hemodilution No    POCT Electrolytes +HGB +HCT    Collection Time: 08/05/20 11:37 AM   Result Value Ref Range    Sodium 149 (H) 138 - 146 mmol/L    POC Potassium 6.3 (C) 3.5 - 4.5 mmol/L    Ionized Calcium 0.91 (L) 1.15 - 1.33 mmol/L    Glucose 93 74 - 100 mg/dL    Hematocrit 41 38 - 51 %    Hemoglobin 13.8 12.0 - 17.0 g/dL   POC Lactate    Collection Time: 08/05/20 11:37 AM   Result Value Ref Range    Lactate 4.8 (C) 0.5 - 2.0 mmol/L   POC Glucose Once    Collection Time: 08/05/20 11:37 AM   Result Value Ref Range    Glucose 93 74 - 100 mg/dL   Lactic Acid, Reflex Timer (This will reflex a repeat order 3-3:15 hours after ordered.)    Collection Time: 08/05/20 11:37 AM   Result Value Ref Range    Hold Tube Hold for add-ons.    POC Glucose Once    Collection Time: 08/05/20 12:18 PM   Result Value Ref Range    Glucose 106 (H) 70 - 105 mg/dL   Lactic Acid, Plasma    Collection Time: 08/05/20 12:19 PM   Result Value Ref Range    Lactate 3.2 (C) 0.5 - 2.0 mmol/L   Lactic Acid, Reflex    Collection Time: 08/05/20  4:07 PM   Result Value Ref Range    Lactate 2.0 0.5 - 2.0 mmol/L   Basic Metabolic Panel    Collection Time: 08/05/20  4:07 PM   Result Value Ref Range    Glucose 287 (H) 65 - 99 mg/dL    BUN      Creatinine 4.52 (H) 0.76 - 1.27 mg/dL    Sodium 149 (H) 136 - 145 mmol/L    Potassium 6.2 (C) 3.5 - 5.2 mmol/L    Chloride 102 98 - 107 mmol/L    CO2 31.0 (H) 22.0 - 29.0 mmol/L    Calcium 9.0 8.6 - 10.5 mg/dL     eGFR   Amer      eGFR Non African Amer 13 (L) >60 mL/min/1.73    BUN/Creatinine Ratio      Anion Gap 16.0 (H) 5.0 - 15.0 mmol/L   BUN    Collection Time: 08/05/20  4:07 PM   Result Value Ref Range    BUN 63 (H) 8 - 23 mg/dL   POC Glucose Once    Collection Time: 08/05/20  5:03 PM   Result Value Ref Range    Glucose 240 (H) 70 - 105 mg/dL   POC Glucose Once    Collection Time: 08/06/20 12:19 AM   Result Value Ref Range    Glucose 49 (L) 70 - 105 mg/dL   POC Glucose Once    Collection Time: 08/06/20  1:39 AM   Result Value Ref Range    Glucose 132 (H) 70 - 105 mg/dL   Magnesium    Collection Time: 08/06/20  5:21 AM   Result Value Ref Range    Magnesium 2.7 (H) 1.6 - 2.4 mg/dL   Phosphorus    Collection Time: 08/06/20  5:21 AM   Result Value Ref Range    Phosphorus 5.0 (H) 2.5 - 4.5 mg/dL   Comprehensive Metabolic Panel    Collection Time: 08/06/20  5:21 AM   Result Value Ref Range    Glucose 456 (C) 65 - 99 mg/dL    BUN      Creatinine 4.62 (H) 0.76 - 1.27 mg/dL    Sodium 135 (L) 136 - 145 mmol/L    Potassium 5.3 (H) 3.5 - 5.2 mmol/L    Chloride 92 (L) 98 - 107 mmol/L    CO2 27.0 22.0 - 29.0 mmol/L    Calcium 8.3 (L) 8.6 - 10.5 mg/dL    Total Protein 6.1 6.0 - 8.5 g/dL    Albumin 2.70 (L) 3.50 - 5.20 g/dL    ALT (SGPT) 225 (H) 1 - 41 U/L    AST (SGOT) 539 (H) 1 - 40 U/L    Alkaline Phosphatase 462 (H) 39 - 117 U/L    Total Bilirubin 0.7 0.0 - 1.2 mg/dL    eGFR Non African Amer 12 (L) >60 mL/min/1.73    eGFR  African Amer      Globulin 3.4 gm/dL    A/G Ratio 0.8 g/dL    BUN/Creatinine Ratio      Anion Gap 16.0 (H) 5.0 - 15.0 mmol/L   CBC Auto Differential    Collection Time: 08/06/20  5:21 AM   Result Value Ref Range    WBC 10.60 3.40 - 10.80 10*3/mm3    RBC 3.55 (L) 4.14 - 5.80 10*6/mm3    Hemoglobin 10.1 (L) 13.0 - 17.7 g/dL    Hematocrit 32.7 (L) 37.5 - 51.0 %    MCV 92.1 79.0 - 97.0 fL    MCH 28.5 26.6 - 33.0 pg    MCHC 30.9 (L) 31.5 - 35.7 g/dL    RDW 16.1 (H) 12.3 - 15.4 %    RDW-SD 52.9 37.0 - 54.0 fl     MPV 8.0 6.0 - 12.0 fL    Platelets 191 140 - 450 10*3/mm3   BUN    Collection Time: 08/06/20  5:21 AM   Result Value Ref Range    BUN 95 (H) 8 - 23 mg/dL   POC Glucose Once    Collection Time: 08/06/20  5:47 AM   Result Value Ref Range    Glucose 174 (H) 70 - 105 mg/dL          Imaging:  [unfilled]    Assessment/Plan:     Dyspnea    Dissection of thoracic aorta (CMS/HCC)     Acute kidney injury likely due to the combination of contrast-induced nephropathy and ATN and diuretic use and possible AIN from antibiotics  Hyperkalemia  Hyponatremia  Urinary retention  Acute respiratory failure  Hypoxia  Fever  Pneumonia  COPD exacerbation  Congestive heart failure  Descending thoracic aorta dissection  Hypertension with hypertensive urgency      No urine output with Lasix drip 20 mg an hour  His volume status does not look increased we use Lasix to lower his potassium and try to induce  nonoliguric status  Potassium is down to 5.3 creatinine is up to 4.6 it was only 4.5 last night  No indication for dialysis yet  Increase Lasix drip to 40 mg an hour  Noted that he is on Levophed and IV fluid  Recheck labs this afternoon

## 2020-08-07 NOTE — CONSULTS
GI CONSULT  NOTE:    Referring Provider:  Dr. Upton    Chief complaint: Coffee-ground DHT residual    Subjective .     History of present illness: Adolfo Alejandro is a 75 y.o. male with past medical history including bipolar disorder, COPD, GERD, hypertension, CAD s/p CABG, CHF, history of AAA repair, cholecystectomy, and history of TBI with subsequent memory dysfunction who presented to the hospital on 7/31 with decreased level of consciousness and shortness of breath.  COVID was negative this time.  He was also found to have a dissecting AAA but CT surgery found that he is not a surgical candidate due to multiple medical issues.  He was found to be in CHF and COPD exacerbations and is also being treated for likely pneumonia.  He has also had significant hypotension and is on vasopressors.  Likely shock liver with acutely elevated LFTs.  Our service has been asked to consult for evaluation of coffee-ground residuals per Dobbhoff tube.  On exam, patient is awake but unable to respond verbally.  No obvious abdominal pain noted.  On BiPAP.  No family present.  History is been supplemented via chart review and via bedside RN. Per RN report, the patient has had multiple bowel movements with bright red blood overnight.  He was also found to have dark brown tube feed aspirate and residuals of 650 mL at 4 AM.  Tube feeds have been held since this time.        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   • Memory loss, short term    • Sepsis (CMS/HCC)        Past Surgical History:  Past Surgical History:   Procedure Laterality Date   • ABDOMINAL AORTIC ANEURYSM REPAIR     •  CHOLECYSTECTOMY     • CORONARY ANGIOPLASTY     • CORONARY ARTERY BYPASS GRAFT  03/13/2014   • THORACOTOMY Right    • TOTAL HIP ARTHROPLASTY Left    • TOTAL HIP ARTHROPLASTY Right 02/03/2014       Social History:  Social History     Tobacco Use   • Smoking status: Former Smoker     Types: Cigarettes   • Smokeless tobacco: Never Used   Substance Use Topics   • Alcohol use: Not Currently     Frequency: Never     Comment: former ETOH abuse Hx   • Drug use: Not Currently       Family History:  Unable to obtain    Medications:  Medications Prior to Admission   Medication Sig Dispense Refill Last Dose   • amiodarone (PACERONE) 200 MG tablet Take 100 mg by mouth Daily.      • budesonide (PULMICORT) 0.5 MG/2ML nebulizer solution Use bid VIA nebulizor   Dx j44.9 (Patient taking differently: Take 0.5 mg by nebulization 2 (Two) Times a Day.) 100 each 4 Taking   • furosemide (LASIX) 40 MG tablet TAKE 1 TABLET BY MOUTH DAILY 30 tablet 2    • ipratropium-albuterol (DUO-NEB) 0.5-2.5 mg/3 ml nebulizer USE 1 VIAL VIA NEBULIZER FOUR TIMES DAILY (Patient taking differently: Take 3 mL by nebulization 4 (Four) Times a Day.) 360 mL 5    • lamoTRIgine (LaMICtal) 100 MG tablet Take 200 mg by mouth Every Morning.  1 Taking   • lamoTRIgine (LaMICtal) 100 MG tablet Take 100 mg by mouth Every Evening.   Taking   • losartan (COZAAR) 100 MG tablet Take 100 mg by mouth Daily.   Taking   • melatonin 5 MG tablet tablet Take 20 mg by mouth Every Night.   Taking   • metoprolol succinate XL (TOPROL-XL) 100 MG 24 hr tablet TAKE ONE TABLET BY MOUTH DAILY 90 tablet 2    • Morphine (MS Contin) 15 MG 12 hr tablet Take 1 tablet by mouth 2 (Two) Times a Day. 60 tablet 0    • PARoxetine (PAXIL) 40 MG tablet Take 40 mg by mouth Daily.  0 Taking   • QUEtiapine (SEROquel) 50 MG tablet Take 50 mg by mouth Every Night.  0 Taking   • roflumilast (DALIRESP) 500 MCG tablet tablet Take 1 tablet by mouth Daily. 30 tablet 6 Taking       Scheduled Meds:  budesonide 0.5 mg  Nebulization BID - RT   cefTRIAXone 2 g Intravenous Q24H   docusate 100 mg Oral BID   insulin lispro 0-7 Units Subcutaneous Q6H   ipratropium-albuterol 3 mL Nebulization 4x Daily - RT   lamoTRIgine 100 mg Oral Nightly   lamoTRIgine 200 mg Oral Daily   Linezolid 600 mg Intravenous Q12H   methylPREDNISolone sodium succinate 40 mg Intravenous Q12H   midodrine 15 mg Oral Q8H   pantoprazole 40 mg Intravenous Q12H   [START ON 8/13/2020] PARoxetine 40 mg Oral Daily   polyethylene glycol 17 g Oral Daily   sodium chloride 10 mL Intravenous Q12H     Continuous Infusions:  dextrose 75 mL/hr Last Rate: 75 mL/hr (08/06/20 1633)   furosemide (LASIX) infusion 1 mg/mL 40 mg/hr Last Rate: 40 mg/hr (08/07/20 0956)   norepinephrine 0.02-0.3 mcg/kg/min Last Rate: 0.1 mcg/kg/min (08/06/20 1445)     PRN Meds:.•  acetaminophen **OR** acetaminophen  •  aluminum-magnesium hydroxide-simethicone  •  bisacodyl  •  dextrose  •  dextrose  •  dextrose  •  dextrose  •  glucagon (human recombinant)  •  hydrALAZINE  •  insulin lispro **AND** insulin lispro  •  ipratropium-albuterol  •  magnesium hydroxide  •  magnesium sulfate **OR** magnesium sulfate in D5W 1g/100mL (PREMIX)  •  ondansetron **OR** ondansetron  •  potassium chloride **OR** potassium chloride **OR** potassium chloride  •  [COMPLETED] Insert peripheral IV **AND** sodium chloride  •  sodium chloride    ALLERGIES:  Patient has no known allergies.    ROS:  Unable to obtain review of systems as patient is not verbally responsive    Objective Resting in bed, chronically ill-appearing, no family present    Vital Signs:   Vitals:    08/07/20 0500 08/07/20 0600 08/07/20 0700 08/07/20 0800   BP:       Pulse: 63 74 66    Resp:       Temp:    97.5 °F (36.4 °C)   TempSrc:    Axillary   SpO2: 95% 95% 97%    Weight: 60.6 kg (133 lb 9.6 oz)      Height:           Physical Exam:       General Appearance:    Awake but not verbally responsive, in no acute distress   Head:    Normocephalic, without  obvious abnormality, atraumatic   Throat:   No oral lesions, no thrush, oral mucosa moist   Lungs:     Respirations regular, even and unlabored, on BiPAP   Chest Wall:    No abnormalities observed   Abdomen:     Soft, no obvious tenderness noted   Rectal:     Deferred   Extremities:   Moves all extremities, no edema, no cyanosis   Pulses:   Pulses palpable and equal bilaterally   Skin:   No rash, no jaundice, normal palpation       Neurologic:   Cranial nerves 2 - 12 grossly intact       Results Review:   I reviewed the patient's labs and imaging.  CBC    Results from last 7 days   Lab Units 08/07/20  0740 08/07/20  0438 08/06/20  0521 08/05/20  1137 08/05/20  0334 08/04/20  0508 08/03/20  0416  08/02/20  0332   WBC 10*3/mm3 14.90* 15.50* 10.60  --  22.20* 13.20* 14.00*  --  15.60*   HEMOGLOBIN g/dL 10.8* 11.3* 10.1*  --  12.0* 11.0* 11.3*   < > 11.5*   HEMOGLOBIN, POC g/dL  --   --   --  13.8  --   --   --   --   --    PLATELETS 10*3/mm3 163 201 191  --  312 321 344  --  365    < > = values in this interval not displayed.     CMP Results from last 7 days   Lab Units 08/07/20  0438 08/06/20  1634 08/06/20  0521 08/05/20  1607 08/05/20  0334 08/04/20  0508 08/03/20  1715 08/03/20  0416 08/02/20  1901 08/02/20  0332 08/01/20  0343   SODIUM mmol/L 139 138 135* 149* 151* 149*  --  146*  --  142 139   POTASSIUM mmol/L 5.8* 5.8* 5.3* 6.2* 5.4* 3.7 3.3* 3.1* 3.2* 2.9* 3.7   CHLORIDE mmol/L 95* 95* 92* 102 102 103  --  94*  --  87* 95*   CO2 mmol/L 26.0 24.0 27.0 31.0* 33.0* 34.0*  --  38.0*  --  41.0* 37.0*   BUN  106* 98* 95* 63* 71* 39*  --  35*  --  24* 19   CREATININE mg/dL 5.40* 5.02* 4.62* 4.52* 3.30* 1.30*  --  1.25  --  1.13 0.78   GLUCOSE mg/dL 126* 242* 456* 287* 151* 101*  --  134*  --  119* 104*   ALBUMIN g/dL 2.70*  --  2.70*  --  3.40* 3.40*  --  3.80  --  4.20 3.90   BILIRUBIN mg/dL 0.6  --  0.7  --  0.7 0.6  --  0.8  --  0.9 0.4   ALK PHOS U/L 592*  --  462*  --  269* 113  --  121*  --  133* 114   AST  (SGOT) U/L 698*  --  539*  --  118* 109*  --  63*  --  38 20   ALT (SGPT) U/L 334*  --  225*  --  47* 38  --  21  --  16 14   MAGNESIUM mg/dL 2.7*  --  2.7*  --  2.8* 2.4  --  2.1 2.2 1.6 1.8   PHOSPHORUS mg/dL 5.6*  --  5.0*  --  4.0 2.5  --  3.3  --  4.2 3.1     Cr Clearance Estimated Creatinine Clearance: 10 mL/min (A) (by C-G formula based on SCr of 5.4 mg/dL (H)).  Coag     HbA1C No results found for: HGBA1C  Blood Glucose   Glucose   Date/Time Value Ref Range Status   08/07/2020 0633 136 (H) 70 - 105 mg/dL Final     Comment:     Serial Number: [id number]:  [id number]   08/07/2020 0018 108 (H) 70 - 105 mg/dL Final     Comment:     Serial Number: [id number]:  [id number]   08/06/2020 2125 180 (H) 70 - 105 mg/dL Final     Comment:     Serial Number: [id number]:  [id number]   08/06/2020 1700 252 (H) 70 - 105 mg/dL Final     Comment:     Serial Number: [id number]:  [id number]   08/06/2020 1146 161 (H) 70 - 105 mg/dL Final     Comment:     Serial Number: [id number]:  [id number]   08/06/2020 0547 174 (H) 70 - 105 mg/dL Final     Comment:     Serial Number: [id number]:  [id number]   08/06/2020 0139 132 (H) 70 - 105 mg/dL Final     Comment:     Serial Number: [id number]:  [id number]   08/06/2020 0019 49 (L) 70 - 105 mg/dL Final     Comment:     Serial Number: [id number]:  [id number]     Infection Results from last 7 days   Lab Units 08/07/20  1018 08/05/20  1219 08/05/20  1118 08/05/20  1107   BLOODCX   --  No growth at 24 hours  --  No growth at 2 days   URINECX   --   --  >100,000 CFU/mL Pseudomonas aeruginosa*  --    PROCALCITONIN ng/mL 29.65*  --   --  6.00*     UA  Results from last 7 days   Lab Units 08/05/20  1118   NITRITE UA  Negative   WBC UA /HPF 13-20*   BACTERIA UA /HPF Trace*   SQUAM EPITHEL UA /HPF None Seen   URINECX  >100,000 CFU/mL Pseudomonas aeruginosa*     Radiology(recent) Ct Abdomen Pelvis Without Contrast    Result Date: 8/5/2020   1. No acute  intra-abdominal or pelvic process. No evidence of free air. There is a large stool burden present throughout the colon, most pronounced within the rectum, likely related to constipation/obstipation. There is mild gaseous distention of the colon seen proximally. 2. Patchy airspace consolidations within the bilateral lower lobes consistent with pneumonia. 3. Ancillary findings as described above.    Electronically Signed By-Jackie Morejon On:8/5/2020 9:53 PM This report was finalized on [id number] by  Jackie Morejon, .    Us Renal Bilateral    Result Date: 8/5/2020  1.Technically difficult exam showing relatively small bilateral renal size. 2.No hydronephrosis.  Electronically Signed By-Karla Marcelino On:8/5/2020 3:46 PM This report was finalized on [id number] by  Karla Marcelino, .    Xr Abdomen Kub    Result Date: 8/5/2020   1. Lucency is seen in the upper abdomen under each diaphragmatic margin. This could be artifactual related to air within colon immediately adjacent to the diaphragm. However, I cannot exclude the presence of free intraperitoneal air on this examination. Consider left lateral decubitus abdomen plain film or CT abdomen for further evaluation. I placed a page to discussed the case with Dr. Shaikh at the time of this dictation. 2. Dobbhoff tube tip projects to the proximal gastric body region. 3. Large rectal stool burden.  Electronically Signed By-Dr. Carli Mason MD On:8/5/2020 2:04 PM This report was finalized on [id number] by Dr. Carli Mason MD.         ASSESSMENT  -Possible GI bleed-dark brown residuals per Dobbhoff tube and reported BRBPR.  Hemoglobin stable at 10.8  -Elevated LFTs-suspect shock liver with hypotension noted, on vasopressor  -Constipation-CT on 8/5 showed large stool burden  -Acute respiratory failure/COPD exacerbation/possible pneumonia-on BiPAP  -CHF exacerbation-EF 44%, on Lasix drip  -Dissection of the descending thoracic aorta- CT surgery following, poor surgical  candidate, management with strict blood pressure control at this time  -Acute renal failure-nephrology following, creatinine 5.4, on Lasix drip  -Sepsis-on cefepime and Zyvox  -Hypotension-on Levophed  -History of bipolar disorder  -History of cholecystectomy  -Memory dysfunction secondary to TBI    PLAN  Patient with reported BRBPR.  Dark brown/clear aspirate noted per Dobbhoff tube.  Hemoglobin stable at 10.1, continue to monitor H/H and transfuse as needed.  No plan for endoscopy at this time this patient is critically ill  , , total bilirubin 2.7, alkaline phosphatase 592  We will check full liver serology and right upper quadrant ultrasound  Continue PPI IV twice daily  Continue bowel regimen with Colace and MiraLAX  Continue supportive care    I discussed the patients findings and my recommendations with the patient.    We appreciate the referral    DEEPTI Mclain  08/07/20  11:58

## 2020-08-07 NOTE — PROGRESS NOTES
[facility]M/CC PROGRESS NOTE       HISTORY OF PRESENT ILLNESS:  Adolfo Alejandro is a 75 y.o. 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  Patient is with confusion, and no family is currently at bedside to provide further information regarding patient's clinical presentation.  Patient is confused, but is oriented to person and place.  Patient denies any gross complaints with the exception that he cannot breathe while on BiPAP.     In the ED, labs were obtained and as follows: Troponin <0.010, BNP 9541, glucose 108, CO2 33, WBC 6.3, hemoglobin 9.4, hematocrit 29.7.  ABG: pH 7.299, PCO2 75.1, PO2 67.0, HCO3 36.8, O2 saturation 89.6.  After ABG was obtained, patient was placed on BiPAP, in which a repeat ABG was obtained.  ABG: pH 7.290, PCO2 75.5, PaO2 112.7, HCO3 36.3, and O2 sat 97.5.  It was at this time, that patient was transitioned to AVAPS.  UA was obtained and negative.  Patient shows no evidence of infectious process at this time.  COVID-19 was checked and negative.     [facility] was contacted for admission to ICU and further evaluation and treatment.     INSPECT REPORT:  INSPECT report completed with most recent prescriptions of: Morphine sulfate extended release 15 mg tablet, 60 tablets for 30-day supply filled on 7/18/2020; oxycodone-acetaminophen  mg, 150 tablets for 30-day supply filled on 7/5/2020.       SUBJECTIVE    8/2: Confused and drowsy.  Cardene drip added.  Did not tolerate NIPPV last night.  Currently on 10 L high flow O2  8/3:  Remains on Cardene and a Precedex gtts. Confused.  5 liters of oxygen  8/4:  Off Cardene and Precedex gtts.  Remains confused and in restraints.  On 10 liters high flow nasal cannula.   8/5:  Remains confused and in restraints.  Sitter at  bedside.  On 10 Liters high flow nasal cannula.  Febrile   8/6:  Remains confused and on 10 liters nasal cannula.  On Levophed   8/7 no acute events reported.  Remains confused and restless. Not following commands. Bloody stool this am. AVAP. Levo and lasix gtts    OBJECTIVE    Vitals:    08/07/20 0500 08/07/20 0600 08/07/20 0700 08/07/20 0800   BP:       Pulse: 63 74 66    Resp:       Temp:    97.5 °F (36.4 °C)   TempSrc:    Axillary   SpO2: 95% 95% 97%    Weight: 60.6 kg (133 lb 9.6 oz)      Height:          Intake/Output last 3 shifts:  I/O last 3 completed shifts:  In: 5749 [I.V.:5315; Other:122; NG/GT:312]  Out: 1225 [Urine:1225]  Intake/Output this shift:  No intake/output data recorded.    PHYSICAL EXAM:       Constitutional:  Chronically ill appearing  Eyes:  PERRL, conjunctiva normal, EOMI  HENT:  Atraumatic, external ears normal, nose normal. Neck- normal range of motion, no tenderness, supple, trachea midline  Respiratory:  Clear to diminished bilaterally, non-labored respirations without accessory muscle use  Cardiovascular:  RRR, no murmurs, no gallops, no rubs   GI:  Soft, nondistended, normal bowel sounds, nontender, no rebound or guarding  :   Deferred  Musculoskeletal:  No edema, no clubbing or cyanosis  Integument:  Well hydrated, no rash   Neurologic:   Awake and Confused. Mumbles. No lateralizing deficits  Psychiatric:   Unable to evaluate    Scheduled Meds:    budesonide 0.5 mg Nebulization BID - RT   cefepime 2 g Intravenous Q24H   docusate 100 mg Oral BID   heparin (porcine) 5,000 Units Subcutaneous Q8H   insulin lispro 0-7 Units Subcutaneous Q6H   ipratropium-albuterol 3 mL Nebulization 4x Daily - RT   lamoTRIgine 100 mg Oral Nightly   lamoTRIgine 200 mg Oral Daily   Linezolid 600 mg Intravenous Q12H   methylPREDNISolone sodium succinate 40 mg Intravenous Q8H   midodrine 10 mg Oral Q8H   pantoprazole 40 mg Intravenous Q12H   [START ON 8/13/2020] PARoxetine 40 mg Oral Daily   polyethylene  glycol 17 g Oral Daily   sodium chloride 10 mL Intravenous Q12H       Continuous Infusions:    dextrose 75 mL/hr Last Rate: 75 mL/hr (08/06/20 1633)   furosemide (LASIX) infusion 1 mg/mL 40 mg/hr Last Rate: 40 mg/hr (08/07/20 0314)   norepinephrine 0.02-0.3 mcg/kg/min Last Rate: 0.1 mcg/kg/min (08/06/20 1445)       PRN Meds:•  acetaminophen **OR** acetaminophen  •  aluminum-magnesium hydroxide-simethicone  •  bisacodyl  •  dextrose  •  dextrose  •  dextrose  •  glucagon (human recombinant)  •  hydrALAZINE  •  insulin lispro **AND** insulin lispro  •  ipratropium-albuterol  •  magnesium hydroxide  •  magnesium sulfate **OR** magnesium sulfate in D5W 1g/100mL (PREMIX)  •  ondansetron **OR** ondansetron  •  oxyCODONE  •  potassium chloride **OR** potassium chloride **OR** potassium chloride  •  [COMPLETED] Insert peripheral IV **AND** sodium chloride  •  sodium chloride     LABS    CBC  Results from last 7 days   Lab Units 08/07/20  0438 08/06/20  0521 08/05/20  1137 08/05/20  0334 08/04/20  0508 08/03/20  0416 08/02/20  1901 08/02/20  0332 08/01/20  0343   WBC 10*3/mm3 15.50* 10.60  --  22.20* 13.20* 14.00*  --  15.60* 6.00   RBC 10*6/mm3 4.02* 3.55*  --  4.27 3.88* 3.99*  --  4.03* 3.50*   HEMOGLOBIN g/dL 11.3* 10.1*  --  12.0* 11.0* 11.3* 11.5* 11.5* 10.1*   HEMOGLOBIN, POC g/dL  --   --  13.8  --   --   --   --   --   --    HEMATOCRIT % 35.5* 32.7*  --  38.0 34.5* 34.9* 35.5* 35.2* 31.3*   HEMATOCRIT POC %  --   --  41  --   --   --   --   --   --    MCV fL 88.4 92.1  --  89.0 88.8 87.5  --  87.5 89.3   PLATELETS 10*3/mm3 201 191  --  312 321 344  --  365 273       CMP   Results from last 7 days   Lab Units 08/07/20  0438 08/06/20  1634 08/06/20  0521 08/05/20  1607 08/05/20  0334 08/04/20  0508 08/03/20  1715 08/03/20  0416  08/02/20  0332 08/01/20  0343   SODIUM mmol/L 139 138 135* 149* 151* 149*  --  146*  --  142 139   POTASSIUM mmol/L 5.8* 5.8* 5.3* 6.2* 5.4* 3.7 3.3* 3.1*   < > 2.9* 3.7   CHLORIDE mmol/L 95*  95* 92* 102 102 103  --  94*  --  87* 95*   CO2 mmol/L 26.0 24.0 27.0 31.0* 33.0* 34.0*  --  38.0*  --  41.0* 37.0*   BUN  106* 98* 95* 63* 71* 39*  --  35*  --  24* 19   CREATININE mg/dL 5.40* 5.02* 4.62* 4.52* 3.30* 1.30*  --  1.25  --  1.13 0.78   GLUCOSE mg/dL 126* 242* 456* 287* 151* 101*  --  134*  --  119* 104*   ALBUMIN g/dL 2.70*  --  2.70*  --  3.40* 3.40*  --  3.80  --  4.20 3.90   BILIRUBIN mg/dL 0.6  --  0.7  --  0.7 0.6  --  0.8  --  0.9 0.4   ALK PHOS U/L 592*  --  462*  --  269* 113  --  121*  --  133* 114   AST (SGOT) U/L 698*  --  539*  --  118* 109*  --  63*  --  38 20   ALT (SGPT) U/L 334*  --  225*  --  47* 38  --  21  --  16 14    < > = values in this interval not displayed.       TROPONIN  Results from last 7 days   Lab Units 08/01/20 2002   TROPONIN T ng/mL <0.010       CoAg        ABG  Results from last 7 days   Lab Units 08/07/20  0824 08/06/20  0933 08/05/20  1137 08/02/20  1257 08/02/20  0347 08/01/20  1043 08/01/20  0331   PH, ARTERIAL pH units 7.367 7.296* 7.418 7.562* 7.576* 7.433 7.363   PCO2, ARTERIAL mm Hg 41.7 62.5* 48.6* 48.5* 51.1* 59.8* 70.1*   PO2 ART mm Hg 90.1 151.8* 69.1* 135.7* 81.2* 56.9* 59.8*   O2 SATURATION ART % 96.7 99.0* 93.6* 99.4* 97.2 88.9* 88.1*   BASE EXCESS ART mmol/L -1.4* 2.5 5.6* 18.8* 22.3* 13.1* 12.0*       Microbiology  Microbiology Results (last 10 days)     Procedure Component Value - Date/Time    Blood Culture - Blood, Hand, Right [[id number]] Collected:  08/05/20 1219    Lab Status:  Preliminary result Specimen:  Blood from Hand, Right Updated:  08/06/20 1230     Blood Culture No growth at 24 hours    Legionella Antigen, Urine - Urine, Urine, Clean Catch [[id number]]  (Normal) Collected:  08/05/20 1119    Lab Status:  Final result Specimen:  Urine, Clean Catch Updated:  08/05/20 1224     LEGIONELLA ANTIGEN, URINE Negative    S. Pneumo Ag Urine or CSF - Urine, Urine, Clean Catch [[id number]]  (Abnormal) Collected:  08/05/20 1119    Lab Status:  Final  result Specimen:  Urine, Clean Catch Updated:  08/05/20 1225     Strep Pneumo Ag Positive    Urine Culture - Urine, Urine, Clean Catch [[id number]]  (Abnormal) Collected:  08/05/20 1118    Lab Status:  Preliminary result Specimen:  Urine, Clean Catch Updated:  08/06/20 1254     Urine Culture >100,000 CFU/mL Gram Negative Bacilli    MRSA Screen, PCR (Inpatient) - Swab, Nares [[id number]]  (Abnormal) Collected:  08/05/20 1115    Lab Status:  Final result Specimen:  Swab from Nares Updated:  08/05/20 1301     MRSA PCR MRSA Detected    Respiratory Panel, PCR - Swab, Nasopharynx [[id number]]  (Normal) Collected:  08/05/20 1115    Lab Status:  Final result Specimen:  Swab from Nasopharynx Updated:  08/05/20 1237     ADENOVIRUS, PCR Not Detected     Coronavirus 229E Not Detected     Coronavirus HKU1 Not Detected     Coronavirus NL63 Not Detected     Coronavirus OC43 Not Detected     Human Metapneumovirus Not Detected     Human Rhinovirus/Enterovirus Not Detected     Influenza B PCR Not Detected     Parainfluenza Virus 1 Not Detected     Parainfluenza Virus 2 Not Detected     Parainfluenza Virus 3 Not Detected     Parainfluenza Virus 4 Not Detected     Bordetella pertussis pcr Not Detected     Influenza A H1 2009 PCR Not Detected     Chlamydophila pneumoniae PCR Not Detected     Mycoplasma pneumo by PCR Not Detected     Influenza A PCR Not Detected     Influenza A H3 Not Detected     Influenza A H1 Not Detected     RSV, PCR Not Detected     Bordetella parapertussis PCR Not Detected    Narrative:       The coronavirus on the RVP is NOT COVID-19 and is NOT indicative of infection with COVID-19.     Blood Culture - Blood, Blood, PICC Line [[id number]] Collected:  08/05/20 1107    Lab Status:  Preliminary result Specimen:  Blood, PICC Line Updated:  08/06/20 1145     Blood Culture No growth at 24 hours    COVID-19, [facility] IN-HOUSE,NP Swab (NO TRANSPORT MEDIA) 2 HR TAT - Swab, Nasopharynx [[id number]]  (Normal) Collected:  08/01/20  0014    Lab Status:  Final result Specimen:  Swab from Nasopharynx Updated:  08/01/20 0040     COVID19 Not Detected    Narrative:       Fact sheet for providers: https://www.fda.gov/media/998184/download     Fact sheet for patients: https://www.fda.gov/media/916205/download          IMAGING & OTHER STUDIES    Imaging Results (Last 72 Hours)     Procedure Component Value Units Date/Time    CT Abdomen Pelvis Without Contrast [[id number]] Collected:  08/05/20 2151     Updated:  08/05/20 2156    Narrative:       CT ABDOMEN PELVIS WO CONTRAST-     Date of Exam: 8/5/2020 9:36 PM     Indication: Bowel perforation; R06.00-Dyspnea, unspecified;  J44.1-Chronic obstructive pulmonary disease with (acute) exacerbation;  J96.21-Acute and chronic respiratory failure with hypoxia; J96.22-Acute  and chronic respiratory failure with hypercapnia; R41.82-Altered mental  status, unspecified.     Comparison: CT 08/24/2011, radiograph 08/05/2020     Technique: Contiguous axial CT images were obtained from the lung bases  to the pubic symphysis without contrast. Sagittal and coronal  reconstructions were performed.  Automated exposure control and  iterative reconstruction methods were used.     FINDINGS:     Lower Thorax: Airspace consolidations are present within the bilateral  lower lobes with air bronchograms present. The heart appears mildly  enlarged. Extensive atherosclerotic calcifications are present. The  aorta appears tortuous. Evaluation is limited due to lack of intravenous  contrast. No definite adenopathy identified.     Limited evaluation of the solid organs due to lack of intravenous  contrast.     Liver: Normal size and density. No focal lesions identified.     Gallbladder: The gallbladder appears normal in size with no evidence of  radiopaque gallstones. The biliary tree is nondilated.     Pancreas: No focal masses.  No pancreatic duct dilation.     Spleen: Spleen is normal size.  No focal splenic lesions.     Adrenal  glands: Unremarkable.      Kidneys: The kidneys appear normal in size. No evidence of  nephrolithiasis. No evidence of hydronephrosis. No suspicious focal  lesions identified.     Retroperitoneum/vascular: No retroperitoneal adenopathy identified.  Postsurgical changes are seen from aortic biiliac stent graft repair.  The stent is not well evaluated due to lack of intravenous contrast.     Stomach and Bowel: No abnormally dilated loops of bowel no definite mass  identified. There is mild gaseous distention of the colon. Large stool  burden is present, most pronounced within the rectum. No significant  bowel wall thickening. No free fluid. No focal fluid collections  identified. The appendix is not well visualized. No mesenteric  adenopathy identified. No evidence of free air.     Bladder: The bladder appears unremarkable.     Pelvic Organs: No acute process identified.     Osseous structures: No aggressive focal lytic or sclerotic osseous  lesions. No acute osseous abnormality. Total bilateral hip  arthroplasties are present limiting evaluation of the medially adjacent  structures due to streak artifact. Kyphoplasty changes are present  within the spine. Extensive degenerative changes are present throughout  the spine. Bilateral pars defects are present in L5 with mild  anterolisthesis present at this level.          Impression:          1. No acute intra-abdominal or pelvic process. No evidence of free air.  There is a large stool burden present throughout the colon, most  pronounced within the rectum, likely related to  constipation/obstipation. There is mild gaseous distention of the colon  seen proximally.  2. Patchy airspace consolidations within the bilateral lower lobes  consistent with pneumonia.  3. Ancillary findings as described above.           Electronically Signed By-Jackie Morejon On:8/5/2020 9:53 PM  This report was finalized on [id number] by  Jackie Morejon, .    US Renal Bilateral [[id number]]  Collected:  08/05/20 1540     Updated:  08/05/20 1548    Narrative:       DATE OF EXAM:  8/5/2020 2:21 PM     PROCEDURE:  US RENAL BILATERAL-     INDICATIONS:  DEE, r/o hydronephrosis; R06.00-Dyspnea, unspecified; J44.1-Chronic  obstructive pulmonary disease with (acute) exacerbation; J96.21-Acute  and chronic respiratory failure with hypoxia; J96.22-Acute and chronic  respiratory failure with hypercapnia; R41.82-Altered mental status,  unspecified     COMPARISON:  CTA chest 08/02/2020, renal ultrasound 05/14/2017.     TECHNIQUE:   Grayscale and color Doppler ultrasound evaluation of the kidneys and  urinary bladder was performed.     FINDINGS:  Technologist noted a technically difficult exam due to limited patient  mobility and cooperation.     The right kidney measures approximately 8.7 x 4.1 x 6.0 cm, and the left  kidney measures approximately 8.7 x 4.8 x 5.6 cm. Renal cortical  echogenicity appears within normal limits bilaterally. No renal mass or  hydronephrosis is seen. The urinary bladder is not visualized, as it is  decompressed with a Tripp catheter in place.        Impression:       1.Technically difficult exam showing relatively small bilateral renal  size.  2.No hydronephrosis.     Electronically Signed By-Karla Marcelino On:8/5/2020 3:46 PM  This report was finalized on [id number] by  Karla Marcelino, .    XR Abdomen KUB [[id number]] Collected:  08/05/20 1344     Updated:  08/05/20 1406    Narrative:       DATE OF EXAM:  8/5/2020 1:15 PM     PROCEDURE:  XR ABDOMEN KUB-     INDICATIONS:  Dobbhoff placement.     COMPARISON:  KUB 09/06/2014.     TECHNIQUE:   Single radiographic view of the abdomen was obtained.        FINDINGS:  The radiopaque tip of the Dobbhoff tube projects into the proximal  gastric body region.     There is moderate gaseous distention of upper abdominal bowel loops,  thought to represent the colon. Question is raised of free  intraperitoneal air underneath the diaphragmatic margins.  Surgical chain  sutures in the right upper quadrant. Aortobiiliac endograft repair.  Vertebroplasty changes at T12. Osteopenia. Bilateral hip replacements.  Large stool burden in the region of the rectum. Presumed Tripp catheter  in the  Pelvis.       Impression:          1. Lucency is seen in the upper abdomen under each diaphragmatic margin.  This could be artifactual related to air within colon immediately  adjacent to the diaphragm. However, I cannot exclude the presence of  free intraperitoneal air on this examination. Consider left lateral  decubitus abdomen plain film or CT abdomen for further evaluation. I  placed a page to discussed the case with Dr. Shaikh at the time of this  dictation.  2. Dobbhoff tube tip projects to the proximal gastric body region.  3. Large rectal stool burden.     Electronically Signed By-Dr. Carli Mason MD On:8/5/2020 2:04 PM  This report was finalized on [id number] by Dr. Carli Mason MD.    XR Chest 1 View [[id number]] Collected:  08/04/20 0852     Updated:  08/04/20 0900    Narrative:       DATE OF EXAM:  8/4/2020 8:40 AM     PROCEDURE:  XR CHEST 1 VW-     INDICATIONS:  Increased shortness of breath. Emphysema. Aortic aneurysm.       COMPARISON:  AP portable chest 07/31/2020.     TECHNIQUE:   Single radiographic view of the chest was obtained.     FINDINGS:  Chronic interstitial fibrotic changes are present in the lung bases,  right greater than left, similar to prior examination. No  supraclavicular acute airspace disease is seen. Stable elevation right  hemidiaphragm. No pleural effusion or pneumothorax is identified. Table  mild cardiac enlargement, lateral for patient rotation toward the right.  CABG changes. Right arm approach PICC tip has been placed extending to  the upper right atrial level. Advanced degenerative changes of both  shoulders. Mild osteopenia.       Impression:       No acute airspace disease. Chronic interstitial fibrotic changes in the  lung  bases.     Electronically Signed By-Dr. Carli Mason MD On:8/4/2020 8:58 AM  This report was finalized on [id number] by Dr. Carli Mason MD.        Results for orders placed during the hospital encounter of 03/15/20   Adult Transthoracic Echo Complete W/ Cont if Necessary Per Protocol    Narrative · The following left ventricular wall segments are hypokinetic: mid   anterior, apical anterior, basal anterolateral, mid anterolateral, apical   lateral, basal inferolateral, mid inferolateral, apical inferior, mid   inferior, apical septal, basal inferoseptal, mid inferoseptal, apex   hypokinetic, mid anteroseptal, basal anterior, basal inferior and basal   inferoseptal.  · Left ventricular systolic function is moderately decreased.  · Right ventricular cavity is borderline dilated.  · Left atrial cavity size is borderline dilated.  · Mild to moderate aortic valve regurgitation is present.             Acute respiratory failure with hypercapnia and hypoxia  -Likely multifactorial, with CHF/COPD exacerbation  -ABG and CXRs-reviewed  -Oxygen supplementation as needed, titrate FiO2 to maintain patient's oxygen saturations greater than 91%  -10 L high flow O2 alternated with AVAP.  ABGs reviewed   -taper steroids    Fever  -possible PNA, start Zosyn for possible aspiration.  Change Zosyn to Cefepime.  Cont Zyvox and ceftriaxone    -sputum, blood, and urine cx ordered  -check urine antigens, +strep pna, neg legionella  -resp viral panel neg  -procal 6, recheck     Acute COPD exacerbation  -COVID-19 negative  -CXRs reviewed  -on IV steroids   -Continue Pulmicort twice daily, DuoNeb scheduled and as needed, Daliresp  -Recommend follow-up in outpatient pulmonary clinic    Shock-likely septic  -Levophed, MAP goal 65   -Given additional IVF 1.5 liters 8/5.  Limited IV fluids due to resp status and worsening creatinine.  BNP 9541  -lactate 2  -added Midodrine    Possible Aspiration PNA, strep pneumoniae POSITIVE  -abx as  above  -procal 6, recheck  -urine strep pneumoniae POSITIVE     Acute exacerbation of heart failure with reduced ejection fraction  -BNP 9541 on admission  -Continue scheduled diuresis, decreased to daily.  Monitor creatinine, currently 4.62  -Reviewed previous Echo, EF 44%    Dissection of the descending thoracic aorta  -CV surgery consulted, evaluated CT and CTA with the opinion that the dissection is isolated to the ascending aorta.  The flap looks a little bit thick possibly representing a chronic dissection.  The patient is a poor surgical candidate which was discussed by Dr. Isaacs with the patient's wife and surgery is not recommended.  Comanagement with strict blood pressure control.    -Cardene drip, now off.  Home meds resumed      Hypertensive urgency, history of essential hypertension  -D/C Losartan due to DEE.  D/C metoprolol and Norvasc  -IV Lopressor x 1 dose given, no further BB 2/2 SB  -now hypotensive     Sinus bradycardia, resolved  -Hold amiodarone and metoprolol XL  -No evidence of chronic anticoagulation  -EKG reviewed     CAD, S/P CABG  -Troponin <0.010, monitor serial troponins, remained flat  -Lipid profile reviewed     Confusion/Altered mental status; H/O memory loss 2/2 TBI  -Only 1 SIRS criteria, normal WBC, afebrile  -UA negative  -UDS reviewed, positive for opiates  -OFF Precedex gtt     Hypernatremia   -resolved  -on D5W at 50 ml/hr  -free water if needed per dietician     DEE  -consult to renal  -lasix gtt per renal  -renal US - no hydronephrosis   -emery cath in place  -high risk for HD    ? Free air under diaphragm  -CT Abd/pelvis reviewed   -lactic 2, improved      Bipolar affective disorder  -Lamictal, cont  -restart Paxil, holding Seroquel  -Risperdal d/c    Chronic pain  -resume home pain medication   -monitor for withdrawal symptoms    Possible GIB  -GI consulted       Accuchecks with SSI  Code Status: CPR, Full Interventions  VTE Prophylaxis: discontinue Heparin due to  GIB/SCDs  PUD Prophylaxis: Pepcid D/C, could be contributing to delirium     Add Bowel regimen - stool burden on CT abd    DREAD PICC    Tripp cath   Feeding tube  PICC    Arterial line, radial    Attending physician statement:  High risk   Above note scribed by nurse practitioner for me and later reviewed for accuracy. I've examined the patient and reviewed all labs and images.   I have directly participated in the evaluation and management of this patient.  Robbie Holden MD  [facility]  [facility]

## 2020-08-07 NOTE — PROGRESS NOTES
Palliative Care Follow Up Note    Patient Code Status    Code Status and Medical Interventions:   Ordered at: 08/01/20 0218     Level Of Support Discussed With:    Patient     Code Status:    CPR     Medical Interventions (Level of Support Prior to Arrest):    Full          LOS: 6 days   Patient Care Team:  Adama Vilchis MD as PCP - General    Chief Complaint:  Thrashes in bed without response to command. Withdraws to pain, bipap intact with constant pulling at tubes and lines. Dobhoff feeding tube with faustino over 700 cc brown liquid from abd.  Tube feeding placed on hold and GI consulted.  No family at bedside but wife continues to call regularly for updates and has been clear to continue all aggressive treatments.      Interval History:     Patient Complaints:   Patient Denies:    History taken from:  Patient does not open eyes to commands or follow any commands. He will lay perfectly still one minute then is thrashing in the bed the next minute.      Review of Systems   Unable to perform ROS: Acuity of condition       Physical Exam   Constitutional:   Frail elderly male currently out of restraints but agitated and restless   Cardiovascular: Normal rate and regular rhythm.   Sinus rhythm at 64 with occasional pac   Pulmonary/Chest:   Bilateral breath sounds scattered course with bipap    Abdominal: Soft. Bowel sounds are normal. There is tenderness.   Skin: Skin is warm and dry. Capillary refill takes 2 to 3 seconds.   Vitals reviewed.      Vital Signs (last 24 hours)       08/06 0700  -  08/07 0659 08/07 0700  -  08/07 1254   Most Recent    Temp (°F) 96.9 -  97.5    97.5 -  97.8     97.8 (36.6)    Heart Rate 52 -  91    60 -  100     65    Resp 20 -  25      25     25    BP   114/53 -  162/57     162/57    SpO2 (%) 91 -  100    92 -  99     99          Lab Results (last 24 hours)     Procedure Component Value Units Date/Time    Hep B Confirmation Tube [[id number]] Collected:  08/07/20 1248    Specimen:  Blood  Updated:  08/07/20 1253    Hepatitis Panel, Acute [[id number]] Collected:  08/07/20 1248    Specimen:  Blood Updated:  08/07/20 1253    Narrative:       The following orders were created for panel order Hepatitis Panel, Acute.  Procedure                               Abnormality         Status                     ---------                               -----------         ------                     Hepatitis Panel, Acute[[id number]]                           In process                 Hep B Confirmation Tube[[id number]]                          In process                   Please view results for these tests on the individual orders.    Hepatitis Panel, Acute [[id number]] Collected:  08/07/20 1248    Specimen:  Blood Updated:  08/07/20 1253    Anti-Smooth Muscle Antibody Titer [[id number]] Collected:  08/07/20 1248    Specimen:  Blood Updated:  08/07/20 1253    Anti-microsomal Antibody [[id number]] Collected:  08/07/20 1248    Specimen:  Blood Updated:  08/07/20 1253    Mitochondrial Antibodies, M2 [[id number]] Collected:  08/07/20 1248    Specimen:  Blood Updated:  08/07/20 1253    VICKI [[id number]] Collected:  08/07/20 1248    Specimen:  Blood Updated:  08/07/20 1253    Ceruloplasmin [[id number]] Collected:  08/07/20 1018    Specimen:  Blood Updated:  08/07/20 1243    Alpha - 1 - Antitrypsin [[id number]] Collected:  08/07/20 1018    Specimen:  Blood Updated:  08/07/20 1243    Acetaminophen Level [[id number]] Collected:  08/07/20 1018    Specimen:  Blood Updated:  08/07/20 1242    Ferritin [[id number]] Collected:  08/07/20 1018    Specimen:  Blood Updated:  08/07/20 1242    TSH [[id number]] Collected:  08/07/20 1018    Specimen:  Blood Updated:  08/07/20 1242    Iron [[id number]] Collected:  08/07/20 1018    Specimen:  Blood Updated:  08/07/20 1242    Gamma GT [[id number]] Collected:  08/07/20 1018    Specimen:  Blood Updated:  08/07/20 1242    BUN [[id number]]  (Abnormal) Collected:  08/07/20 0740    Specimen:  Blood Updated:   08/07/20 1235      mg/dL     Blood Culture - Blood, Hand, Right [[id number]] Collected:  08/05/20 1219    Specimen:  Blood from Hand, Right Updated:  08/07/20 1230     Blood Culture No growth at 2 days    CBC & Differential [[id number]] Collected:  08/07/20 0740    Specimen:  Blood Updated:  08/07/20 1223    Narrative:       The following orders were created for panel order CBC & Differential.  Procedure                               Abnormality         Status                     ---------                               -----------         ------                     CBC Auto Differential[[id number]]        Abnormal            Final result                 Please view results for these tests on the individual orders.    CBC Auto Differential [[id number]]  (Abnormal) Collected:  08/07/20 0740    Specimen:  Blood Updated:  08/07/20 1223     WBC 14.90 10*3/mm3      RBC 3.89 10*6/mm3      Hemoglobin 10.8 g/dL      Hematocrit 34.3 %      MCV 88.1 fL      MCH 27.6 pg      MCHC 31.4 g/dL      RDW 16.1 %      RDW-SD 50.3 fl      MPV 8.8 fL      Platelets 163 10*3/mm3     Scan Slide [[id number]] Collected:  08/07/20 0740    Specimen:  Blood Updated:  08/07/20 1223     Scan Slide --     Comment: See Manual Differential Results       Manual Differential [[id number]]  (Abnormal) Collected:  08/07/20 0740    Specimen:  Blood Updated:  08/07/20 1223     Neutrophil % 81.0 %      Lymphocyte % 1.0 %      Monocyte % 7.0 %      Bands %  11.0 %      Neutrophils Absolute 13.71 10*3/mm3      Lymphocytes Absolute 0.15 10*3/mm3      Monocytes Absolute 1.04 10*3/mm3      Anisocytosis Slight/1+     Toxic Granulation Mod/2+     Large Platelets Slight/1+    Basic Metabolic Panel [[id number]]  (Abnormal) Collected:  08/07/20 0740    Specimen:  Blood Updated:  08/07/20 1221     Glucose 119 mg/dL      BUN --     Comment: Testing performed by alternate method        Creatinine 5.24 mg/dL      Sodium 137 mmol/L      Potassium 5.7 mmol/L      Chloride  "93 mmol/L      CO2 23.0 mmol/L      Calcium 8.9 mg/dL      eGFR   Amer --     Comment: <15 Indicative of kidney failure.        eGFR Non African Amer 11 mL/min/1.73      Comment: <15 Indicative of kidney failure.        BUN/Creatinine Ratio --     Comment: Testing not performed        Anion Gap 21.0 mmol/L     Narrative:       GFR Normal >60  Chronic Kidney Disease <60  Kidney Failure <15      POC Glucose Once [[id number]]  (Abnormal) Collected:  08/07/20 1152    Specimen:  Blood Updated:  08/07/20 1203     Glucose 108 mg/dL      Comment: Serial Number: [id number]:  [id number]       Blood Culture - Blood, Blood, PICC Line [[id number]] Collected:  08/05/20 1107    Specimen:  Blood, PICC Line Updated:  08/07/20 1130     Blood Culture No growth at 2 days    Procalcitonin [[id number]]  (Abnormal) Collected:  08/07/20 1018    Specimen:  Blood Updated:  08/07/20 1102     Procalcitonin 29.65 ng/mL     Narrative:       As a Marker for Sepsis (Non-Neonates):   1. <0.5 ng/mL represents a low risk of severe sepsis and/or septic shock.  1. >2 ng/mL represents a high risk of severe sepsis and/or septic shock.    As a Marker for Lower Respiratory Tract Infections that require antibiotic therapy:  PCT on Admission     Antibiotic Therapy             6-12 Hrs later  > 0.5                Strongly Recommended            >0.25 - <0.5         Recommended  0.1 - 0.25           Discouraged                   Remeasure/reassess PCT  <0.1                 Strongly Discouraged          Remeasure/reassess PCT      As 28 day mortality risk marker: \"Change in Procalcitonin Result\" (> 80 % or <=80 %) if Day 0 (or Day 1) and Day 4 values are available. Refer to http://www.[employer]-pct-calculator.com/   Change in PCT <=80 %   A decrease of PCT levels below or equal to 80 % defines a positive change in PCT test result representing a higher risk for 28-day all-cause mortality of patients diagnosed with severe sepsis or septic shock.  Change in " PCT > 80 %   A decrease of PCT levels of more than 80 % defines a negative change in PCT result representing a lower risk for 28-day all-cause mortality of patients diagnosed with severe sepsis or septic shock.                Results may be falsely decreased if patient taking Biotin.     Urine Culture - Urine, Urine, Clean Catch [[id number]]  (Abnormal)  (Susceptibility) Collected:  08/05/20 1118    Specimen:  Urine, Clean Catch Updated:  08/07/20 0920     Urine Culture >100,000 CFU/mL Pseudomonas aeruginosa    Susceptibility      Pseudomonas aeruginosa     SUSANA     Cefepime Susceptible     Ceftazidime Susceptible     Ciprofloxacin Intermediate     Gentamicin Susceptible     Levofloxacin Resistant     Piperacillin + Tazobactam Susceptible                    Blood Gas, Arterial [[id number]]  (Abnormal) Collected:  08/07/20 0824    Specimen:  Arterial Blood Updated:  08/07/20 0828     Site Arterial Line     Andres's Test N/A     pH, Arterial 7.367 pH units      pCO2, Arterial 41.7 mm Hg      pO2, Arterial 90.1 mm Hg      HCO3, Arterial 23.9 mmol/L      Base Excess, Arterial -1.4 mmol/L      Comment: Serial Number: [id number]:  [id number]        O2 Saturation, Arterial 96.7 %      CO2 Content 25.2 mmol/L      Barometric Pressure for Blood Gas --     Comment: N/A        Modality BiPap     FIO2 30 %      Ventilator Mode ;NIV     Set Tidal Volume 500     PEEP 5     PSV 15 cmH2O      Hemodilution No    Occult Blood, Fecal By Immunoassay - Stool, Per Rectum [[id number]]  (Abnormal) Collected:  08/07/20 0738    Specimen:  Stool from Per Rectum Updated:  08/07/20 0825     Occult Blood, Fecal by Immunoassay Positive    POC Glucose Once [[id number]]  (Abnormal) Collected:  08/07/20 0633    Specimen:  Blood Updated:  08/07/20 0737     Glucose 136 mg/dL      Comment: Serial Number: [id number]:  [id number]       Manual Differential [[id number]]  (Abnormal) Collected:  08/07/20 0438    Specimen:  Blood Updated:  08/07/20 0730      Neutrophil % 75.0 %      Lymphocyte % 3.0 %      Monocyte % 4.0 %      Bands %  18.0 %      Neutrophils Absolute 14.42 10*3/mm3      Lymphocytes Absolute 0.47 10*3/mm3      Monocytes Absolute 0.62 10*3/mm3      Anisocytosis Slight/1+     Bernarda Cells Slight/1+     Poikilocytes Slight/1+     Toxic Granulation Mod/2+     Platelet Morphology Normal    CBC & Differential [[id number]] Collected:  08/07/20 0438    Specimen:  Blood Updated:  08/07/20 0730    Narrative:       The following orders were created for panel order CBC & Differential.  Procedure                               Abnormality         Status                     ---------                               -----------         ------                     CBC Auto Differential[[id number]]        Abnormal            Final result                 Please view results for these tests on the individual orders.    CBC Auto Differential [[id number]]  (Abnormal) Collected:  08/07/20 0438    Specimen:  Blood Updated:  08/07/20 0730     WBC 15.50 10*3/mm3      RBC 4.02 10*6/mm3      Hemoglobin 11.3 g/dL      Hematocrit 35.5 %      MCV 88.4 fL      MCH 28.1 pg      MCHC 31.8 g/dL      RDW 16.3 %      RDW-SD 50.8 fl      MPV 8.6 fL      Platelets 201 10*3/mm3     Scan Slide [[id number]] Collected:  08/07/20 0438    Specimen:  Blood Updated:  08/07/20 0730     Scan Slide --     Comment: See Manual Differential Results       BUN [[id number]]  (Abnormal) Collected:  08/07/20 0438    Specimen:  Blood Updated:  08/07/20 0531      mg/dL     Magnesium [[id number]]  (Abnormal) Collected:  08/07/20 0438    Specimen:  Blood Updated:  08/07/20 0530     Magnesium 2.7 mg/dL     Phosphorus [[id number]]  (Abnormal) Collected:  08/07/20 0438    Specimen:  Blood Updated:  08/07/20 0530     Phosphorus 5.6 mg/dL     Comprehensive Metabolic Panel [[id number]]  (Abnormal) Collected:  08/07/20 0438    Specimen:  Blood Updated:  08/07/20 0530     Glucose 126 mg/dL      BUN --     Comment: Testing  performed by alternate method        Creatinine 5.40 mg/dL      Sodium 139 mmol/L      Potassium 5.8 mmol/L      Chloride 95 mmol/L      CO2 26.0 mmol/L      Calcium 8.9 mg/dL      Total Protein 6.6 g/dL      Albumin 2.70 g/dL      ALT (SGPT) 334 U/L      AST (SGOT) 698 U/L      Alkaline Phosphatase 592 U/L      Total Bilirubin 0.6 mg/dL      eGFR Non African Amer 10 mL/min/1.73      Comment: <15 Indicative of kidney failure.        eGFR   Amer --     Comment: <15 Indicative of kidney failure.        Globulin 3.9 gm/dL      A/G Ratio 0.7 g/dL      BUN/Creatinine Ratio --     Comment: Testing not performed        Anion Gap 18.0 mmol/L     Narrative:       GFR Normal >60  Chronic Kidney Disease <60  Kidney Failure <15      POC Glucose Once [[id number]]  (Abnormal) Collected:  08/07/20 0018    Specimen:  Blood Updated:  08/07/20 0022     Glucose 108 mg/dL      Comment: Serial Number: [id number]:  [id number]       POC Glucose Once [[id number]]  (Abnormal) Collected:  08/06/20 2125    Specimen:  Blood Updated:  08/06/20 2139     Glucose 180 mg/dL      Comment: Serial Number: [id number]:  [id number]       BUN [[id number]]  (Abnormal) Collected:  08/06/20 1634    Specimen:  Blood Updated:  08/06/20 1749     BUN 98 mg/dL     Basic Metabolic Panel [[id number]]  (Abnormal) Collected:  08/06/20 1634    Specimen:  Blood Updated:  08/06/20 1747     Glucose 242 mg/dL      BUN --     Comment: Testing performed by alternate method        Creatinine 5.02 mg/dL      Sodium 138 mmol/L      Potassium 5.8 mmol/L      Chloride 95 mmol/L      CO2 24.0 mmol/L      Calcium 8.8 mg/dL      eGFR   Amer --     Comment: <15 Indicative of kidney failure.        eGFR Non African Amer 11 mL/min/1.73      Comment: <15 Indicative of kidney failure.        BUN/Creatinine Ratio --     Comment: Testing not performed        Anion Gap 19.0 mmol/L     Narrative:       GFR Normal >60  Chronic Kidney Disease <60  Kidney Failure <15       POC Glucose Once [[id number]]  (Abnormal) Collected:  08/06/20 1700    Specimen:  Blood Updated:  08/06/20 1703     Glucose 252 mg/dL      Comment: Serial Number: [id number]:  [id number]              Results Review:     I reviewed the patient's new clinical results.      Assessment/Plan   Agitated / TME  Respiratory failure / bipap  Sinus bradycardia /improved  Brown liquid from DHT /pending GI consultation  Active Problems:    Dyspnea    Dissection of thoracic aorta (CMS/HCC)      Plan    Continue to provide support to patient and family    Sandra Alexis, DEEPTI  Palliative Medicine

## 2020-08-07 NOTE — PROCEDURES
Central Venous dialysis catheter Placement  Date: 8/7/2020  Time: 1515    Indication: Temporary hemodialysis    Attending: Dr. Holden  A time-out was completed verifying correct patient, procedure, site, positioning. The patient was placed in a dependent position appropriate for central line placement based on the vein to be cannulated. The patient’s IJ vein was identified via ultrasound and the right lateral neck was prepped and draped in sterile fashion. 1% Lidocaine was used to anesthetize the surrounding skin area and subcutaneous tissue.  An 18-gauge needle was then introduced to the vein under ultrasound guidance at which time dark red, nonpulsatile blood was visualized.  A guidewire was then passed smoothly through the needle and the needle removed.  The vein skin and subcutaneous tissue and dilated with use of the supplied dilator.  A large bore dialysis catheter was introduced into the right IJ vein using the Seldinger technique. The catheter was threaded smoothly over the guide wire and then the wire was removed and visualized to be intact.  Immediate return of dark red, nonpulsatile blood was obtained from each port which were then flushed with 10 mL of sterile saline. The catheter was then sutured in place to the skin and a sterile dressing applied.     Estimated Blood Loss: 3 mL  The patient tolerated the procedure well and there were no complications.     Chest x-ray pending to verify placement and assess for pneumothorax    Attending physician statement:  I have directly participated in the evaluation and management of this patient.  Robbie Holden MD  Pulmonary and [facility]  [facility]

## 2020-08-07 NOTE — PLAN OF CARE
Pt was extubated this morning & is till on continuous bipap. Not stable for therapy. Will FU this weekend if able or Mon if appropriate. Room not entered.

## 2020-08-07 NOTE — PROGRESS NOTES
"Nutrition Services    Patient Name:  Adolfo Alejandro  YOB: 1945  MRN: [id number]  Admit Date:  7/31/2020    Progress Note:    Tube feed check on. Novasource Renal documented at initial goal 25 mL/hr. Pt on AVAPs. GI consulted for possible GIB.    Labs (reviewed below):  -Hyperkalemia  -Elevated LFTs  -BUN/Crt con't trending up  -Hypermagnesemia  -Hypophosphatemia    Bowel function: Residual >500 mL this AM with \"dark brown\" in color. Pt last BM documented on 8/6.    Nutrition Intervention:    TF currently on hold r/t elevated gastric residual & possible GIB. RDN will check on over weekend & monitor for ability to resume EN support.    EN Prescription: Novasource Renal at 25 mL/hr + 10 mL/hr H2O flush    Results from last 7 days   Lab Units 08/07/20  0438 08/06/20  1634 08/06/20  0521  08/05/20  0334   SODIUM mmol/L 139 138 135*   < > 151*   POTASSIUM mmol/L 5.8* 5.8* 5.3*   < > 5.4*   CHLORIDE mmol/L 95* 95* 92*   < > 102   CO2 mmol/L 26.0 24.0 27.0   < > 33.0*   BUN  106* 98* 95*   < > 71*   CREATININE mg/dL 5.40* 5.02* 4.62*   < > 3.30*   CALCIUM mg/dL 8.9 8.8 8.3*   < > 9.5   BILIRUBIN mg/dL 0.6  --  0.7  --  0.7   ALK PHOS U/L 592*  --  462*  --  269*   ALT (SGPT) U/L 334*  --  225*  --  47*   AST (SGOT) U/L 698*  --  539*  --  118*   GLUCOSE mg/dL 126* 242* 456*   < > 151*    < > = values in this interval not displayed.     Results from last 7 days   Lab Units 08/07/20  0438 08/06/20  0521  08/05/20  0334   MAGNESIUM mg/dL 2.7* 2.7*  --  2.8*   PHOSPHORUS mg/dL 5.6* 5.0*  --  4.0   HEMOGLOBIN g/dL 11.3* 10.1*  --  12.0*   HEMOGLOBIN, POC   --   --    < >  --    HEMATOCRIT % 35.5* 32.7*  --  38.0   HEMATOCRIT POC   --   --    < >  --     < > = values in this interval not displayed.        Electronically signed by:  Vandana Wiley RD  08/07/20 08:34   "

## 2020-08-07 NOTE — PLAN OF CARE
Problem: Patient Care Overview  Goal: Plan of Care Review  Flowsheets (Taken 8/7/2020 1210)  Plan of Care Reviewed With: other (see comments) (RN)  Outcome Summary: RN refused rx. Pt on bipap currently and not following commands. Extremely confused today. PT did not enter room.

## 2020-08-07 NOTE — PROGRESS NOTES
Nephrology  Progress Note                                        [facility]    Patient Identification    Name: Adolfo Alejandro  Age: 75 y.o.  Sex: male  :  1945  MRN: [id number]      DATE OF SERVICE:  2020        Subective    Still on  oxygen  On Lasix drip  Urine output started to      Objective   Scheduled Meds:    budesonide 0.5 mg Nebulization BID - RT   cefepime 2 g Intravenous Q24H   docusate 100 mg Oral BID   heparin (porcine) 5,000 Units Subcutaneous Q8H   insulin lispro 0-7 Units Subcutaneous Q6H   ipratropium-albuterol 3 mL Nebulization 4x Daily - RT   lamoTRIgine 100 mg Oral Nightly   lamoTRIgine 200 mg Oral Daily   Linezolid 600 mg Intravenous Q12H   methylPREDNISolone sodium succinate 40 mg Intravenous Q8H   midodrine 10 mg Oral Q8H   pantoprazole 40 mg Intravenous Q12H   [START ON 2020] PARoxetine 40 mg Oral Daily   polyethylene glycol 17 g Oral Daily   sodium chloride 10 mL Intravenous Q12H         Continuous Infusions:    dextrose 75 mL/hr Last Rate: 75 mL/hr (20 1633)   furosemide (LASIX) infusion 1 mg/mL 40 mg/hr Last Rate: 40 mg/hr (20 0314)   norepinephrine 0.02-0.3 mcg/kg/min Last Rate: 0.1 mcg/kg/min (20 1445)       PRN Meds:•  acetaminophen **OR** acetaminophen  •  aluminum-magnesium hydroxide-simethicone  •  bisacodyl  •  dextrose  •  dextrose  •  dextrose  •  glucagon (human recombinant)  •  hydrALAZINE  •  insulin lispro **AND** insulin lispro  •  ipratropium-albuterol  •  magnesium hydroxide  •  magnesium sulfate **OR** magnesium sulfate in D5W 1g/100mL (PREMIX)  •  ondansetron **OR** ondansetron  •  oxyCODONE  •  potassium chloride **OR** potassium chloride **OR** potassium chloride  •  [COMPLETED] Insert peripheral IV **AND** sodium chloride  •  sodium chloride     Exam:  /58   Pulse 66    "Temp 96.9 °F (36.1 °C) (Axillary)   Resp 24   Ht 165.1 cm (65\")   Wt 60.6 kg (133 lb 9.6 oz)   SpO2 97%   BMI 22.23 kg/m²     Intake/Output last 3 shifts:  I/O last 3 completed shifts:  In: 5749 [I.V.:5315; Other:122; NG/GT:312]  Out: 1225 [Urine:1225]    Intake/Output this shift:  No intake/output data recorded.    Physical exam:  General Appearance: On BiPAP  Head:  Normocephalic, without obvious abnormality, atraumatic  Eyes:  PERRL, conjunctiva/corneas clear     Neck:  Supple,  no adenopathy;      Lungs:  Decreased BS occasion ronchi  Heart:  Regular rate and rhythm, S1 and S2 normal  Abdomen:  Soft, non-tender, bowel sounds active   Extremities: trace edema  Pulses: 2+ and symmetric all extremities  Skin:  No rashes or lesions       Data Review:  All labs (24hrs):   Recent Results (from the past 24 hour(s))   Blood Gas, Arterial    Collection Time: 08/06/20  9:33 AM   Result Value Ref Range    Site Arterial Line     Andres's Test N/A     pH, Arterial 7.296 (L) 7.350 - 7.450 pH units    pCO2, Arterial 62.5 (H) 35.0 - 48.0 mm Hg    pO2, Arterial 151.8 (H) 83.0 - 108.0 mm Hg    HCO3, Arterial 30.5 (H) 21.0 - 28.0 mmol/L    Base Excess, Arterial 2.5 0.0 - 3.0 mmol/L    O2 Saturation, Arterial 99.0 (H) 94.0 - 98.0 %    CO2 Content 32.4 (H) 22 - 29 mmol/L    Barometric Pressure for Blood Gas      Modality HFNC     Hemodilution No    POC Glucose Once    Collection Time: 08/06/20 11:46 AM   Result Value Ref Range    Glucose 161 (H) 70 - 105 mg/dL   Basic Metabolic Panel    Collection Time: 08/06/20  4:34 PM   Result Value Ref Range    Glucose 242 (H) 65 - 99 mg/dL    BUN      Creatinine 5.02 (H) 0.76 - 1.27 mg/dL    Sodium 138 136 - 145 mmol/L    Potassium 5.8 (H) 3.5 - 5.2 mmol/L    Chloride 95 (L) 98 - 107 mmol/L    CO2 24.0 22.0 - 29.0 mmol/L    Calcium 8.8 8.6 - 10.5 mg/dL    eGFR  African Amer      eGFR Non African Amer 11 (L) >60 mL/min/1.73    BUN/Creatinine Ratio      Anion Gap 19.0 (H) 5.0 - 15.0 mmol/L   "   BUN    Collection Time: 08/06/20  4:34 PM   Result Value Ref Range    BUN 98 (H) 8 - 23 mg/dL   POC Glucose Once    Collection Time: 08/06/20  5:00 PM   Result Value Ref Range    Glucose 252 (H) 70 - 105 mg/dL   POC Glucose Once    Collection Time: 08/06/20  9:25 PM   Result Value Ref Range    Glucose 180 (H) 70 - 105 mg/dL   POC Glucose Once    Collection Time: 08/07/20 12:18 AM   Result Value Ref Range    Glucose 108 (H) 70 - 105 mg/dL   Magnesium    Collection Time: 08/07/20  4:38 AM   Result Value Ref Range    Magnesium 2.7 (H) 1.6 - 2.4 mg/dL   Phosphorus    Collection Time: 08/07/20  4:38 AM   Result Value Ref Range    Phosphorus 5.6 (H) 2.5 - 4.5 mg/dL   Comprehensive Metabolic Panel    Collection Time: 08/07/20  4:38 AM   Result Value Ref Range    Glucose 126 (H) 65 - 99 mg/dL    BUN      Creatinine 5.40 (H) 0.76 - 1.27 mg/dL    Sodium 139 136 - 145 mmol/L    Potassium 5.8 (H) 3.5 - 5.2 mmol/L    Chloride 95 (L) 98 - 107 mmol/L    CO2 26.0 22.0 - 29.0 mmol/L    Calcium 8.9 8.6 - 10.5 mg/dL    Total Protein 6.6 6.0 - 8.5 g/dL    Albumin 2.70 (L) 3.50 - 5.20 g/dL    ALT (SGPT) 334 (H) 1 - 41 U/L    AST (SGOT) 698 (H) 1 - 40 U/L    Alkaline Phosphatase 592 (H) 39 - 117 U/L    Total Bilirubin 0.6 0.0 - 1.2 mg/dL    eGFR Non African Amer 10 (L) >60 mL/min/1.73    eGFR  African Amer      Globulin 3.9 gm/dL    A/G Ratio 0.7 g/dL    BUN/Creatinine Ratio      Anion Gap 18.0 (H) 5.0 - 15.0 mmol/L   CBC Auto Differential    Collection Time: 08/07/20  4:38 AM   Result Value Ref Range    WBC 15.50 (H) 3.40 - 10.80 10*3/mm3    RBC 4.02 (L) 4.14 - 5.80 10*6/mm3    Hemoglobin 11.3 (L) 13.0 - 17.7 g/dL    Hematocrit 35.5 (L) 37.5 - 51.0 %    MCV 88.4 79.0 - 97.0 fL    MCH 28.1 26.6 - 33.0 pg    MCHC 31.8 31.5 - 35.7 g/dL    RDW 16.3 (H) 12.3 - 15.4 %    RDW-SD 50.8 37.0 - 54.0 fl    MPV 8.6 6.0 - 12.0 fL    Platelets 201 140 - 450 10*3/mm3   Scan Slide    Collection Time: 08/07/20  4:38 AM   Result Value Ref Range    Scan  Slide     BUN    Collection Time: 08/07/20  4:38 AM   Result Value Ref Range     (H) 8 - 23 mg/dL   Manual Differential    Collection Time: 08/07/20  4:38 AM   Result Value Ref Range    Neutrophil % 75.0 42.7 - 76.0 %    Lymphocyte % 3.0 (L) 19.6 - 45.3 %    Monocyte % 4.0 (L) 5.0 - 12.0 %    Bands %  18.0 (H) 0.0 - 5.0 %    Neutrophils Absolute 14.42 (H) 1.70 - 7.00 10*3/mm3    Lymphocytes Absolute 0.47 (L) 0.70 - 3.10 10*3/mm3    Monocytes Absolute 0.62 0.10 - 0.90 10*3/mm3    Anisocytosis Slight/1+ None Seen    Preston Cells Slight/1+ None Seen    Poikilocytes Slight/1+ None Seen    Toxic Granulation Mod/2+ None Seen    Platelet Morphology Normal Normal   POC Glucose Once    Collection Time: 08/07/20  6:33 AM   Result Value Ref Range    Glucose 136 (H) 70 - 105 mg/dL          Imaging:  [unfilled]    Assessment/Plan:     Dyspnea    Dissection of thoracic aorta (CMS/HCC)     Acute kidney injury likely due to the combination of contrast-induced nephropathy and ATN and diuretic use and possible AIN from antibiotics  Hyperkalemia  Hyponatremia  Urinary retention  Acute respiratory failure  Hypoxia  Fever  Pneumonia  COPD exacerbation  Congestive heart failure  Descending thoracic aorta dissection  Hypertension with hypertensive urgency      Urine output just started to  potassium was 5 8 this morning aggressive medical therapy has been ordered  On Lasix drip 40 mg an hour  Creatinine 5.4 from 5.0  Recheck labs stat now at 11:30 am and based on that will decide about dialysis  Discussed with the nurse  Discussed with the pulmonary nurse practitioner  Patient is critically ill  Spent 33 minutes critical care time

## 2020-08-07 NOTE — PLAN OF CARE
Problem: Patient Care Overview  Goal: Plan of Care Review  Outcome: Ongoing (interventions implemented as appropriate)  Note:   Pt remains on AVAPS. Vitals stable. Remains on pressor. UOP has increased. Pt had high tube feed residuals this morning. Dark brown aspirate in color. Pt also had some increased agitation this morning. Gave him oxycodone. He appears to be more calm at this time. No acute distress noted.

## 2020-08-07 NOTE — PLAN OF CARE
Problem: Patient Care Overview  Goal: Plan of Care Review  Outcome: Ongoing (interventions implemented as appropriate)  Note:   Pt stable but remains on Levo gtt; d/c lasix gtt and d5w; Hemodialysis Cath placed and pt started on dialysis; Pt remains on AVAPS; Will continue to monitor.

## 2020-08-08 NOTE — PROGRESS NOTES
[facility]ULM/CC PROGRESS NOTE       HISTORY OF PRESENT ILLNESS:  Adolfo Alejandro is a 75 y.o. 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  Patient is with confusion, and no family is currently at bedside to provide further information regarding patient's clinical presentation.  Patient is confused, but is oriented to person and place.  Patient denies any gross complaints with the exception that he cannot breathe while on BiPAP.     In the ED, labs were obtained and as follows: Troponin <0.010, BNP 9541, glucose 108, CO2 33, WBC 6.3, hemoglobin 9.4, hematocrit 29.7.  ABG: pH 7.299, PCO2 75.1, PO2 67.0, HCO3 36.8, O2 saturation 89.6.  After ABG was obtained, patient was placed on BiPAP, in which a repeat ABG was obtained.  ABG: pH 7.290, PCO2 75.5, PaO2 112.7, HCO3 36.3, and O2 sat 97.5.  It was at this time, that patient was transitioned to AVAPS.  UA was obtained and negative.  Patient shows no evidence of infectious process at this time.  COVID-19 was checked and negative.     [facility] was contacted for admission to ICU and further evaluation and treatment.     INSPECT REPORT:  INSPECT report completed with most recent prescriptions of: Morphine sulfate extended release 15 mg tablet, 60 tablets for 30-day supply filled on 7/18/2020; oxycodone-acetaminophen  mg, 150 tablets for 30-day supply filled on 7/5/2020.       SUBJECTIVE    8/2: Confused and drowsy.  Cardene drip added.  Did not tolerate NIPPV last night.  Currently on 10 L high flow O2  8/3:  Remains on Cardene and a Precedex gtts. Confused.  5 liters of oxygen  8/4:  Off Cardene and Precedex gtts.  Remains confused and in restraints.  On 10 liters high flow nasal cannula.   8/5:  Remains confused and in restraints.  Sitter at  bedside.  On 10 Liters high flow nasal cannula.  Febrile   8/6:  Remains confused and on 10 liters nasal cannula.  On Levophed   8/7 no acute events reported.  Remains confused and restless. Not following commands. Bloody stool this am. AVAP. Levo and lasix gtts  8/8: AVAP overnight.  Remains confused and restless.  Dialysis started yesterday    OBJECTIVE    Vitals:    08/08/20 0726 08/08/20 0753 08/08/20 0800 08/08/20 0802   BP: 129/72      Pulse: 99 99     Resp:  26  22   Temp:   98.8 °F (37.1 °C)    TempSrc:   Axillary    SpO2:  98%     Weight:       Height:          Intake/Output last 3 shifts:  I/O last 3 completed shifts:  In: 5519.3 [I.V.:4631.3; Other:219; NG/GT:669]  Out: 2175 [Urine:2025; Emesis/NG output:150]  Intake/Output this shift:  No intake/output data recorded.    PHYSICAL EXAM:       Constitutional:  Chronically ill appearing  Eyes:  PERRL, conjunctiva normal, EOMI  HENT:  Atraumatic, external ears normal, nose normal. Neck- normal range of motion, no tenderness, supple, trachea midline.  Right IJ Shiley  Respiratory:  Clear to diminished bilaterally, non-labored respirations without accessory muscle use  Cardiovascular:  RRR, no murmurs, no gallops, no rubs   GI:  Soft, nondistended, normal bowel sounds, nontender, no rebound or guarding  :   Deferred  Musculoskeletal:  No edema, no clubbing or cyanosis  Integument:  Well hydrated, no rash   Neurologic:   Awake and Confused. Mumbles. No lateralizing deficits  Psychiatric:   Unable to evaluate    Scheduled Meds:    budesonide 0.5 mg Nebulization BID - RT   cefepime 2 g Intravenous Q24H   docusate 100 mg Oral BID   furosemide 40 mg Intravenous Q12H   insulin lispro 0-7 Units Subcutaneous Q6H   ipratropium-albuterol 3 mL Nebulization 4x Daily - RT   lamoTRIgine 100 mg Oral Nightly   lamoTRIgine 200 mg Oral Daily   Linezolid 600 mg Intravenous Q12H   methylPREDNISolone sodium succinate 40 mg Intravenous Q12H   midodrine 15 mg Oral Q8H      pantoprazole 40 mg Intravenous Q12H   [START ON 8/13/2020] PARoxetine 40 mg Oral Daily   polyethylene glycol 17 g Oral Daily   sodium chloride 10 mL Intravenous Q12H       Continuous Infusions:    norepinephrine 0.02-0.3 mcg/kg/min Last Rate: Stopped (08/08/20 0613)   Pharmacy Consult - Pharmacy to dose         PRN Meds:•  acetaminophen **OR** acetaminophen  •  aluminum-magnesium hydroxide-simethicone  •  bisacodyl  •  dextrose  •  dextrose  •  dextrose  •  dextrose  •  glucagon (human recombinant)  •  hydrALAZINE  •  insulin lispro **AND** insulin lispro  •  ipratropium-albuterol  •  magnesium hydroxide  •  magnesium sulfate **OR** magnesium sulfate in D5W 1g/100mL (PREMIX)  •  ondansetron **OR** ondansetron  •  Pharmacy Consult - Pharmacy to dose  •  potassium chloride **OR** potassium chloride **OR** potassium chloride  •  [COMPLETED] Insert peripheral IV **AND** sodium chloride  •  sodium chloride     LABS    CBC  Results from last 7 days   Lab Units 08/08/20  0410 08/07/20  0740 08/07/20  0438 08/06/20  0521 08/05/20  1137 08/05/20  0334 08/04/20  0508 08/03/20  0416   WBC 10*3/mm3 15.40* 14.90* 15.50* 10.60  --  22.20* 13.20* 14.00*   RBC 10*6/mm3 3.81* 3.89* 4.02* 3.55*  --  4.27 3.88* 3.99*   HEMOGLOBIN g/dL 10.8* 10.8* 11.3* 10.1*  --  12.0* 11.0* 11.3*   HEMOGLOBIN, POC g/dL  --   --   --   --  13.8  --   --   --    HEMATOCRIT % 33.4* 34.3* 35.5* 32.7*  --  38.0 34.5* 34.9*   HEMATOCRIT POC %  --   --   --   --  41  --   --   --    MCV fL 87.7 88.1 88.4 92.1  --  89.0 88.8 87.5   PLATELETS 10*3/mm3 152 163 201 191  --  312 321 344       CMP   Results from last 7 days   Lab Units 08/08/20  0409 08/07/20  0740 08/07/20  0438 08/06/20  1634 08/06/20  0521 08/05/20  1607 08/05/20  0334 08/04/20  0508  08/03/20  0416  08/02/20  0332   SODIUM mmol/L 139 137 139 138 135* 149* 151* 149*  --  146*  --  142   POTASSIUM mmol/L 5.4* 5.7* 5.8* 5.8* 5.3* 6.2* 5.4* 3.7   < > 3.1*   < > 2.9*   CHLORIDE mmol/L 99 93* 95*  95* 92* 102 102 103  --  94*  --  87*   CO2 mmol/L 24.0 23.0 26.0 24.0 27.0 31.0* 33.0* 34.0*  --  38.0*  --  41.0*   BUN  66* 108* 106* 98* 95* 63* 71* 39*  --  35*  --  24*   CREATININE mg/dL 3.82* 5.24* 5.40* 5.02* 4.62* 4.52* 3.30* 1.30*  --  1.25  --  1.13   GLUCOSE mg/dL 95 119* 126* 242* 456* 287* 151* 101*  --  134*  --  119*   ALBUMIN g/dL 2.60*  --  2.70*  --  2.70*  --  3.40* 3.40*  --  3.80  --  4.20   BILIRUBIN mg/dL 0.5  --  0.6  --  0.7  --  0.7 0.6  --  0.8  --  0.9   ALK PHOS U/L 580*  --  592*  --  462*  --  269* 113  --  121*  --  133*   AST (SGOT) U/L 695*  --  698*  --  539*  --  118* 109*  --  63*  --  38   ALT (SGPT) U/L 341*  --  334*  --  225*  --  47* 38  --  21  --  16    < > = values in this interval not displayed.       TROPONIN  Results from last 7 days   Lab Units 08/01/20  2002   TROPONIN T ng/mL <0.010       CoAg  Results from last 7 days   Lab Units 08/08/20  0409   INR  1.07       ABG  Results from last 7 days   Lab Units 08/08/20  0734 08/07/20  0824 08/06/20  0933 08/05/20  1137 08/02/20  1257 08/02/20  0347 08/01/20  1043   PH, ARTERIAL pH units 7.449 7.367 7.296* 7.418 7.562* 7.576* 7.433   PCO2, ARTERIAL mm Hg 33.7* 41.7 62.5* 48.6* 48.5* 51.1* 59.8*   PO2 ART mm Hg 85.5 90.1 151.8* 69.1* 135.7* 81.2* 56.9*   O2 SATURATION ART % 97.0 96.7 99.0* 93.6* 99.4* 97.2 88.9*   BASE EXCESS ART mmol/L -0.2* -1.4* 2.5 5.6* 18.8* 22.3* 13.1*       Microbiology  Microbiology Results (last 10 days)     Procedure Component Value - Date/Time    Blood Culture - Blood, Hand, Right [[id number]] Collected:  08/05/20 1219    Lab Status:  Preliminary result Specimen:  Blood from Hand, Right Updated:  08/07/20 1230     Blood Culture No growth at 2 days    Legionella Antigen, Urine - Urine, Urine, Clean Catch [[id number]]  (Normal) Collected:  08/05/20 1119    Lab Status:  Final result Specimen:  Urine, Clean Catch Updated:  08/05/20 1224     LEGIONELLA ANTIGEN, URINE Negative    S. Pneumo Ag Urine or  CSF - Urine, Urine, Clean Catch [[id number]]  (Abnormal) Collected:  08/05/20 1119    Lab Status:  Final result Specimen:  Urine, Clean Catch Updated:  08/05/20 1225     Strep Pneumo Ag Positive    Urine Culture - Urine, Urine, Clean Catch [[id number]]  (Abnormal)  (Susceptibility) Collected:  08/05/20 1118    Lab Status:  Final result Specimen:  Urine, Clean Catch Updated:  08/07/20 0920     Urine Culture >100,000 CFU/mL Pseudomonas aeruginosa    Susceptibility      Pseudomonas aeruginosa     SUSANA     Cefepime Susceptible     Ceftazidime Susceptible     Ciprofloxacin Intermediate     Gentamicin Susceptible     Levofloxacin Resistant     Piperacillin + Tazobactam Susceptible                    MRSA Screen, PCR (Inpatient) - Swab, Nares [[id number]]  (Abnormal) Collected:  08/05/20 1115    Lab Status:  Final result Specimen:  Swab from Nares Updated:  08/05/20 1301     MRSA PCR MRSA Detected    Respiratory Panel, PCR - Swab, Nasopharynx [[id number]]  (Normal) Collected:  08/05/20 1115    Lab Status:  Final result Specimen:  Swab from Nasopharynx Updated:  08/05/20 1237     ADENOVIRUS, PCR Not Detected     Coronavirus 229E Not Detected     Coronavirus HKU1 Not Detected     Coronavirus NL63 Not Detected     Coronavirus OC43 Not Detected     Human Metapneumovirus Not Detected     Human Rhinovirus/Enterovirus Not Detected     Influenza B PCR Not Detected     Parainfluenza Virus 1 Not Detected     Parainfluenza Virus 2 Not Detected     Parainfluenza Virus 3 Not Detected     Parainfluenza Virus 4 Not Detected     Bordetella pertussis pcr Not Detected     Influenza A H1 2009 PCR Not Detected     Chlamydophila pneumoniae PCR Not Detected     Mycoplasma pneumo by PCR Not Detected     Influenza A PCR Not Detected     Influenza A H3 Not Detected     Influenza A H1 Not Detected     RSV, PCR Not Detected     Bordetella parapertussis PCR Not Detected    Narrative:       The coronavirus on the RVP is NOT COVID-19 and is NOT indicative  of infection with COVID-19.     Blood Culture - Blood, Blood, PICC Line [[id number]]  (Abnormal) Collected:  08/05/20 1107    Lab Status:  Final result Specimen:  Blood, PICC Line Updated:  08/08/20 0649     Blood Culture Staphylococcus, coagulase negative     Comment: Probable contaminant requires clinical correlation, susceptibility not performed unless requested by physician.          Isolated from Anaerobic Bottle     Gram Stain Anaerobic Bottle Gram positive cocci in clusters    Blood Culture ID, PCR - Blood, Blood, PICC Line [[id number]]  (Abnormal) Collected:  08/05/20 1107    Lab Status:  Final result Specimen:  Blood, PICC Line Updated:  08/07/20 1430     BCID, PCR Staphylococcus spp, not aureus. Identification by BCID PCR.     BOTTLE TYPE Anaerobic Bottle    COVID-19, [facility] IN-HOUSE,NP Swab (NO TRANSPORT MEDIA) 2 HR TAT - Swab, Nasopharynx [[id number]]  (Normal) Collected:  08/01/20 0014    Lab Status:  Final result Specimen:  Swab from Nasopharynx Updated:  08/01/20 0040     COVID19 Not Detected    Narrative:       Fact sheet for providers: https://www.fda.gov/media/482663/download     Fact sheet for patients: https://www.fda.gov/media/559066/download          IMAGING & OTHER STUDIES    Imaging Results (Last 72 Hours)     Procedure Component Value Units Date/Time    XR Chest 1 View [[id number]] Collected:  08/07/20 1551     Updated:  08/07/20 1554    Narrative:       DATE OF EXAM:  8/7/2020 3:46 PM     PROCEDURE:  XR CHEST 1 VW-     INDICATIONS:  Line placement     COMPARISON:  08/04/2020     TECHNIQUE:   Single radiographic view of the chest was obtained.     FINDINGS:  Placement of right IJ Shiley catheter in satisfactory position. NG tube  courses through the thorax. Right upper extremity PICC line in  satisfactory position. Status post CABG. Bibasilar atelectasis. No  evidence of pneumothorax. Regional skeleton is unremarkable.       Impression:          1. Right IJ Shiley catheter in satisfactory  position without evidence of  pneumothorax.     2. Bibasilar atelectasis.     Electronically Signed By-Hayden Rodriguez On:8/7/2020 3:52 PM  This report was finalized on [id number] by  Hayden Rodriguez, .    US Gallbladder [[id number]] Collected:  08/07/20 1330     Updated:  08/07/20 1335    Narrative:       US GALLBLADDER-     Date of Exam: 8/7/2020 12:52 PM     Indication: Elevated LFTs.     Comparison: None available.     Technique: Transverse and sagittal ultrasound images of the right upper  quadrant were obtained. Doppler evaluation was also conducted.     FINDINGS:  The liver is normal in echotexture. No masses are identified. Status  post cholecystectomy. There is mild intra and extra hepatic biliary duct  dilation with common bile duct measuring up to 1.4 cm.  No obstructing  stone or mass is identified and this likely secondary to  postcholecystectomy changes. There is normal flow within the hepatic and  portal veins. Trace amount of perirenal fluid is identified. The right  kidney is otherwise unchanged from prior.       Impression:       Biliary ductal dilation is favored to be secondary to  postcholecystectomy changes.     Trace amount of fluid adjacent to the right kidney of uncertain etiology  or clinical significance.     Electronically Signed By-Hayden Rodriguez On:8/7/2020 1:33 PM  This report was finalized on [id number] by  Hayden Rodriguez, .    CT Abdomen Pelvis Without Contrast [[id number]] Collected:  08/05/20 2151     Updated:  08/05/20 2156    Narrative:       CT ABDOMEN PELVIS WO CONTRAST-     Date of Exam: 8/5/2020 9:36 PM     Indication: Bowel perforation; R06.00-Dyspnea, unspecified;  J44.1-Chronic obstructive pulmonary disease with (acute) exacerbation;  J96.21-Acute and chronic respiratory failure with hypoxia; J96.22-Acute  and chronic respiratory failure with hypercapnia; R41.82-Altered mental  status, unspecified.     Comparison: CT 08/24/2011, radiograph 08/05/2020     Technique: Contiguous  axial CT images were obtained from the lung bases  to the pubic symphysis without contrast. Sagittal and coronal  reconstructions were performed.  Automated exposure control and  iterative reconstruction methods were used.     FINDINGS:     Lower Thorax: Airspace consolidations are present within the bilateral  lower lobes with air bronchograms present. The heart appears mildly  enlarged. Extensive atherosclerotic calcifications are present. The  aorta appears tortuous. Evaluation is limited due to lack of intravenous  contrast. No definite adenopathy identified.     Limited evaluation of the solid organs due to lack of intravenous  contrast.     Liver: Normal size and density. No focal lesions identified.     Gallbladder: The gallbladder appears normal in size with no evidence of  radiopaque gallstones. The biliary tree is nondilated.     Pancreas: No focal masses.  No pancreatic duct dilation.     Spleen: Spleen is normal size.  No focal splenic lesions.     Adrenal glands: Unremarkable.      Kidneys: The kidneys appear normal in size. No evidence of  nephrolithiasis. No evidence of hydronephrosis. No suspicious focal  lesions identified.     Retroperitoneum/vascular: No retroperitoneal adenopathy identified.  Postsurgical changes are seen from aortic biiliac stent graft repair.  The stent is not well evaluated due to lack of intravenous contrast.     Stomach and Bowel: No abnormally dilated loops of bowel no definite mass  identified. There is mild gaseous distention of the colon. Large stool  burden is present, most pronounced within the rectum. No significant  bowel wall thickening. No free fluid. No focal fluid collections  identified. The appendix is not well visualized. No mesenteric  adenopathy identified. No evidence of free air.     Bladder: The bladder appears unremarkable.     Pelvic Organs: No acute process identified.     Osseous structures: No aggressive focal lytic or sclerotic osseous  lesions. No  acute osseous abnormality. Total bilateral hip  arthroplasties are present limiting evaluation of the medially adjacent  structures due to streak artifact. Kyphoplasty changes are present  within the spine. Extensive degenerative changes are present throughout  the spine. Bilateral pars defects are present in L5 with mild  anterolisthesis present at this level.          Impression:          1. No acute intra-abdominal or pelvic process. No evidence of free air.  There is a large stool burden present throughout the colon, most  pronounced within the rectum, likely related to  constipation/obstipation. There is mild gaseous distention of the colon  seen proximally.  2. Patchy airspace consolidations within the bilateral lower lobes  consistent with pneumonia.  3. Ancillary findings as described above.           Electronically Signed By-Jackie Morejon On:8/5/2020 9:53 PM  This report was finalized on [id number] by  Jackie Morejon, .    US Renal Bilateral [[id number]] Collected:  08/05/20 1540     Updated:  08/05/20 1548    Narrative:       DATE OF EXAM:  8/5/2020 2:21 PM     PROCEDURE:  US RENAL BILATERAL-     INDICATIONS:  DEE, r/o hydronephrosis; R06.00-Dyspnea, unspecified; J44.1-Chronic  obstructive pulmonary disease with (acute) exacerbation; J96.21-Acute  and chronic respiratory failure with hypoxia; J96.22-Acute and chronic  respiratory failure with hypercapnia; R41.82-Altered mental status,  unspecified     COMPARISON:  CTA chest 08/02/2020, renal ultrasound 05/14/2017.     TECHNIQUE:   Grayscale and color Doppler ultrasound evaluation of the kidneys and  urinary bladder was performed.     FINDINGS:  Technologist noted a technically difficult exam due to limited patient  mobility and cooperation.     The right kidney measures approximately 8.7 x 4.1 x 6.0 cm, and the left  kidney measures approximately 8.7 x 4.8 x 5.6 cm. Renal cortical  echogenicity appears within normal limits bilaterally. No renal mass  or  hydronephrosis is seen. The urinary bladder is not visualized, as it is  decompressed with a Tripp catheter in place.        Impression:       1.Technically difficult exam showing relatively small bilateral renal  size.  2.No hydronephrosis.     Electronically Signed By-Karla Marcelino On:8/5/2020 3:46 PM  This report was finalized on [id number] by  Karla Marcelino, .    XR Abdomen KUB [[id number]] Collected:  08/05/20 1344     Updated:  08/05/20 1406    Narrative:       DATE OF EXAM:  8/5/2020 1:15 PM     PROCEDURE:  XR ABDOMEN KUB-     INDICATIONS:  Dobbhoff placement.     COMPARISON:  KUB 09/06/2014.     TECHNIQUE:   Single radiographic view of the abdomen was obtained.        FINDINGS:  The radiopaque tip of the Dobbhoff tube projects into the proximal  gastric body region.     There is moderate gaseous distention of upper abdominal bowel loops,  thought to represent the colon. Question is raised of free  intraperitoneal air underneath the diaphragmatic margins. Surgical chain  sutures in the right upper quadrant. Aortobiiliac endograft repair.  Vertebroplasty changes at T12. Osteopenia. Bilateral hip replacements.  Large stool burden in the region of the rectum. Presumed Tripp catheter  in the  Pelvis.       Impression:          1. Lucency is seen in the upper abdomen under each diaphragmatic margin.  This could be artifactual related to air within colon immediately  adjacent to the diaphragm. However, I cannot exclude the presence of  free intraperitoneal air on this examination. Consider left lateral  decubitus abdomen plain film or CT abdomen for further evaluation. I  placed a page to discussed the case with Dr. Shaikh at the time of this  dictation.  2. Dobbhoff tube tip projects to the proximal gastric body region.  3. Large rectal stool burden.     Electronically Signed By-Dr. Carli Mason MD On:8/5/2020 2:04 PM  This report was finalized on [id number] by Dr. Carli Mason MD.        Results for  orders placed during the hospital encounter of 03/15/20   Adult Transthoracic Echo Complete W/ Cont if Necessary Per Protocol    Narrative · The following left ventricular wall segments are hypokinetic: mid   anterior, apical anterior, basal anterolateral, mid anterolateral, apical   lateral, basal inferolateral, mid inferolateral, apical inferior, mid   inferior, apical septal, basal inferoseptal, mid inferoseptal, apex   hypokinetic, mid anteroseptal, basal anterior, basal inferior and basal   inferoseptal.  · Left ventricular systolic function is moderately decreased.  · Right ventricular cavity is borderline dilated.  · Left atrial cavity size is borderline dilated.  · Mild to moderate aortic valve regurgitation is present.             Acute respiratory failure with hypercapnia and hypoxia  -Likely multifactorial, with CHF/COPD exacerbation  -ABG and CXRs-reviewed  -Oxygen supplementation as needed, titrate FiO2 to maintain patient's oxygen saturations greater than 91%  -10 L high flow O2 alternated with AVAP, 2 hours on and 2 hours off as tolerated.  ABGs reviewed   -taper steroids    Septic shock  -Given additional IVF 1.5 liters 8/5.  Limited IV fluids due to resp status and worsening creatinine.  BNP 9541  -possible PNA, start Zosyn for possible aspiration.  Continued empiric antibiotics with Zosyn to Cefepime which was de-escalated to Zyvox and ceftriaxone.  Will switch back to cefepime and Zyvox to cover for Pseudomonas  -sputum culture pending  -Blood culture pending  -Urine culture with pseudomonas aeruginosa  -check urine antigens, +strep pna, neg legionella  -resp viral panel neg  -procal 6, now 29.65  -Levophed currently off, maintaining MAP 65  -Midodrine added, will start weaning off     Acute COPD exacerbation  -COVID-19 negative  -CXRs reviewed  -on IV steroids   -Continue Pulmicort twice daily, DuoNeb scheduled and as needed, Daliresp  -Recommend follow-up in outpatient pulmonary clinic    Acute  exacerbation of heart failure with reduced ejection fraction  -BNP 9541 on admission  -Continue scheduled diuresis, decreased to daily.  Monitor creatinine  -Reviewed previous Echo, EF 44%    Dissection of the descending thoracic aorta  -CV surgery consulted, evaluated CT and CTA with the opinion that the dissection is isolated to the ascending aorta.  The flap looks a little bit thick possibly representing a chronic dissection.  The patient is a poor surgical candidate which was discussed by Dr. Isaacs with the patient's wife and surgery is not recommended.  Comanagement with strict blood pressure control.    -Cardene drip, now off.   -Restart home meds as tolerated by blood pressure     Hypertensive urgency, history of essential hypertension  -D/C Losartan due to DEE.  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 Free air under diaphragm  -CT Abd/pelvis reviewed   -lactic 2, improved      Bipolar affective disorder  -Lamictal, cont  -restart Paxil, holding Seroquel  -Risperdal d/c    Chronic pain  -resume home pain medication   -monitor for withdrawal symptoms    Possible GIB  -GI consulted       Accuchecks with SSI  Code Status: CPR, Full Interventions  VTE Prophylaxis: discontinue Heparin due to GIB now SCDs  PUD Prophylaxis: Pepcid D/C, could be  contributing to delirium.  Now on Protonix    Added bowel regimen - stool burden on CT abd    DREAD PICC  Right IJ Hayleyley 8/7/2020    Tripp cath   Feeding tube  PICC    Arterial line, radial-discontinue    Attending physician statement:  High risk   Above note scribed by nurse practitioner for me and later reviewed for accuracy. I've examined the patient and reviewed all labs and images.   I have directly participated in the evaluation and management of this patient.  Robbie Holden MD  Pulmonary and [facility]  [facility]

## 2020-08-08 NOTE — PLAN OF CARE
Problem: Patient Care Overview  Goal: Plan of Care Review  Outcome: Ongoing (interventions implemented as appropriate)  Flowsheets (Taken 8/8/2020 0700)  Progress: declining  Note:   Pt stable overnight; Levo gtt off early this morning; Pt continues to have high residuals; Tube feeds currently off; Urine output slowing down; Will continue to monitor.

## 2020-08-08 NOTE — PROGRESS NOTES
Nutrition Services    Patient Name:  Adolfo Alejandro  YOB: 1945  MRN: [id number]  Admit Date:  7/31/2020    Progress Note:    Tube feed check on. Novasource Renal restarted this am at 25ml/hr however turned off again r/t residuals 500 ml.     Labs (reviewed below):  -Hyperkalemia- improved   -Elevated LFTs- GIs note reviewed   -BUN/Crt- improved   -Hypermagnesemia- resolved     -Hypophosphatemia- improved     Bowel function: Residual >500 mL this AM, + BM 8/8     Nutrition Intervention:    Called and spoke with RN. TF remain on hold, however at most recent residual check <500 ml. He is going to try and restart TFs. If residuals reach > 500 ml after restarting TF, recommend MD to consider addition of gastric motility agent such as Reglan.     EN Prescription: Novasource Renal at 25 mL/hr + 10 mL/hr H2O flush    Results from last 7 days   Lab Units 08/08/20  0409 08/07/20  0740 08/07/20 0438 08/06/20  0521   SODIUM mmol/L 139 137 139   < > 135*   POTASSIUM mmol/L 5.4* 5.7* 5.8*   < > 5.3*   CHLORIDE mmol/L 99 93* 95*   < > 92*   CO2 mmol/L 24.0 23.0 26.0   < > 27.0   BUN  66* 108* 106*   < > 95*   CREATININE mg/dL 3.82* 5.24* 5.40*   < > 4.62*   CALCIUM mg/dL 9.1 8.9 8.9   < > 8.3*   BILIRUBIN mg/dL 0.5  --  0.6  --  0.7   ALK PHOS U/L 580*  --  592*  --  462*   ALT (SGPT) U/L 341*  --  334*  --  225*   AST (SGOT) U/L 695*  --  698*  --  539*   GLUCOSE mg/dL 95 119* 126*   < > 456*    < > = values in this interval not displayed.     Results from last 7 days   Lab Units 08/08/20  0410 08/08/20  0409  08/07/20 0438 08/06/20  0521   MAGNESIUM mg/dL  --  2.3  --  2.7* 2.7*   PHOSPHORUS mg/dL  --  4.7*  --  5.6* 5.0*   HEMOGLOBIN g/dL 10.8*  --    < > 11.3* 10.1*   HEMATOCRIT % 33.4*  --    < > 35.5* 32.7*    < > = values in this interval not displayed.        Electronically signed by:  Afsaneh Rosa RD  08/08/20 11:25

## 2020-08-08 NOTE — PROGRESS NOTES
LOS: 7 days   Patient Care Team:  Adama Vilchis MD as PCP - General      Subjective     Interval History:     Subjective: No acute events overnight.  Patient on and off BiPAP and does not answer questions or respond/follow commands      ROS:   Unable to obtain secondary to the patient unresponsive       Medication Review:     Current Facility-Administered Medications:   •  acetaminophen (TYLENOL) tablet 650 mg, 650 mg, Oral, Q4H PRN, 650 mg at 08/04/20 2140 **OR** acetaminophen (TYLENOL) suppository 650 mg, 650 mg, Rectal, Q4H PRN, Adama Trejo APRN, 650 mg at 08/03/20 0403  •  albumin human 25 % IV SOLN 12.5 g, 12.5 g, Intravenous, PRN, Fannie Corbin MD  •  aluminum-magnesium hydroxide-simethicone (MAALOX MAX) 400-400-40 MG/5ML suspension 15 mL, 15 mL, Oral, Q6H PRN, Adama Trejo APRN  •  bisacodyl (DULCOLAX) suppository 10 mg, 10 mg, Rectal, Daily PRN, Adama Trejo APRN, 10 mg at 08/04/20 1948  •  budesonide (PULMICORT) nebulizer solution 0.5 mg, 0.5 mg, Nebulization, BID - RT, Adama Trejo APRN, 0.5 mg at 08/08/20 0753  •  dextrose (D50W) 25 g/ 50mL Intravenous Solution 25 g, 25 g, Intravenous, Q15 Min PRN, Natalie Pantoja APRN  •  dextrose (D50W) 25 g/ 50mL Intravenous Solution 50 mL, 50 mL, Intravenous, Q1H PRN, Macie Shaikh MD, 50 mL at 08/06/20 0022  •  dextrose (D50W) 25 g/ 50mL Intravenous Solution 50 mL, 50 mL, Intravenous, Q1H PRN, Fannie Corbin MD, 50 mL at 08/07/20 0940  •  dextrose (GLUTOSE) oral gel 15 g, 15 g, Oral, Q15 Min PRN, Natalie Pantoja APRN  •  docusate (COLACE) 50 MG/5ML liquid 100 mg, 100 mg, Oral, BID, Robbie Holden MD, 100 mg at 08/08/20 0924  •  furosemide (LASIX) injection 40 mg, 40 mg, Intravenous, Q12H, Fannie Corbin MD, 40 mg at 08/08/20 0924  •  glucagon (human recombinant) (GLUCAGEN DIAGNOSTIC) injection 1 mg, 1 mg, Subcutaneous, Q15 Min PRN, Natalie Pantoja, APRN  •  heparin (porcine) injection 4,000 Units, 4,000 Units, Intracatheter, PRN, Emerald  MD Fannie  •  hydrALAZINE (APRESOLINE) injection 10 mg, 10 mg, Intravenous, Q4H PRN, Day, Paola, APRSTEPHEN, 10 mg at 08/08/20 1136  •  insulin lispro (humaLOG) injection 0-7 Units, 0-7 Units, Subcutaneous, Q6H, 2 Units at 08/06/20 2127 **AND** insulin lispro (humaLOG) injection 0-7 Units, 0-7 Units, Subcutaneous, PRN, Natalie Pantoja APRN  •  ipratropium-albuterol (DUO-NEB) nebulizer solution 3 mL, 3 mL, Nebulization, Q2H PRN, Adama Trejo APRN  •  ipratropium-albuterol (DUO-NEB) nebulizer solution 3 mL, 3 mL, Nebulization, 4x Daily - RT, Adama Trejo APRN, 3 mL at 08/08/20 1513  •  lamoTRIgine (LaMICtal) tablet 100 mg, 100 mg, Oral, Nightly, Adama Trejo APRN, 100 mg at 08/07/20 2126  •  lamoTRIgine (LaMICtal) tablet 200 mg, 200 mg, Oral, Daily, Adama Trejo APRN, 200 mg at 08/08/20 0925  •  Linezolid (ZYVOX) 600 mg 300 mL, 600 mg, Intravenous, Q12H, Macie Shaikh MD, Last Rate: 300 mL/hr at 08/08/20 0359, 600 mg at 08/08/20 0359  •  magnesium hydroxide (MILK OF MAGNESIA) suspension 2400 mg/10mL 10 mL, 10 mL, Oral, Daily PRN, Adama Trejo APRN  •  Magnesium Sulfate 2 gram infusion - Mg less than or equal to 1.5 mg/dL, 2 g, Intravenous, PRN **OR** Magnesium Sulfate 1 gram infusion - Mg 1.6-1.9 mg/dL, 1 g, Intravenous, PRN, Adama Trejo APRN, Last Rate: 100 mL/hr at 08/02/20 0857, 1 g at 08/02/20 0857  •  methylPREDNISolone sodium succinate (SOLU-Medrol) injection 40 mg, 40 mg, Intravenous, Q12H, Robbie Holden MD, 40 mg at 08/08/20 0931  •  ondansetron (ZOFRAN) tablet 4 mg, 4 mg, Oral, Q6H PRN **OR** ondansetron (ZOFRAN) injection 4 mg, 4 mg, Intravenous, Q6H PRN, Adama Trejo APRN  •  pantoprazole (PROTONIX) injection 40 mg, 40 mg, Intravenous, Q12H, Robbie Holden MD, 40 mg at 08/08/20 0924  •  [START ON 8/13/2020] PARoxetine (PAXIL) tablet 40 mg, 40 mg, Oral, Daily, Rbobie Holden MD  •  piperacillin-tazobactam (ZOSYN) IVPB 3.375 g in 100 mL NS (CD), 3.375 g,  Intravenous, Once, Day, Paola, APRN  •  [START ON 8/9/2020] piperacillin-tazobactam (ZOSYN) IVPB 3.375 g in 100 mL NS (CD), 3.375 g, Intravenous, Q12H, Day, Paola, APRN  •  polyethylene glycol (MIRALAX) packet 17 g, 17 g, Oral, Daily, Robbie Holden MD, 17 g at 08/08/20 0925  •  potassium chloride (K-DUR,KLOR-CON) CR tablet 40 mEq, 40 mEq, Oral, PRN **OR** potassium chloride (KLOR-CON) packet 40 mEq, 40 mEq, Oral, PRN, 40 mEq at 08/03/20 2015 **OR** potassium chloride 10 mEq in 100 mL IVPB, 10 mEq, Intravenous, Q1H PRN, LoveRaheemip E, APRN, Last Rate: 100 mL/hr at 08/03/20 1241, 10 mEq at 08/03/20 1241  •  [COMPLETED] Insert peripheral IV, , , Once **AND** sodium chloride 0.9 % flush 10 mL, 10 mL, Intravenous, PRN, Willi Santamaria MD  •  sodium chloride 0.9 % flush 10 mL, 10 mL, Intravenous, Q12H, Love, Adama E, APRN, 10 mL at 08/08/20 0925  •  sodium chloride 0.9 % flush 10 mL, 10 mL, Intravenous, PRN, Love, Adama E, APRN      Objective     Vital Signs  Vitals:    08/08/20 1405 08/08/20 1415 08/08/20 1513 08/08/20 1517   BP: 115/71 108/66     Pulse: 89 86 96    Resp:   24 28   Temp:       TempSrc:       SpO2: 94% 94%     Weight:       Height:         Physical Exam:    General Appearance:    Awake and alert, in no acute distress   Head:    Normocephalic, without obvious abnormality   Eyes:          Conjunctivae normal, anicteric sclera   Ears:    Hearing intact   Lungs:     respirations regular   Abdomen:     soft, non-distended, no hepatosplenomegaly    Rectal:     Deferred   Extremities:   No edema, no cyanosis, no redness        Results Review:    Lab Results (last 24 hours)     Procedure Component Value Units Date/Time    Blood Culture - Blood, Hand, Right [[id number]] Collected:  08/05/20 1219    Specimen:  Blood from Hand, Right Updated:  08/08/20 1230     Blood Culture No growth at 3 days    POC Glucose Once [[id number]]  (Normal) Collected:  08/08/20 1226    Specimen:  Blood Updated:  08/08/20 1226        Glucose 96 mg/dL      Comment: Serial Number: [id number]:  [id number]       Blood Gas, Arterial [[id number]]  (Abnormal) Collected:  08/08/20 0734    Specimen:  Arterial Blood Updated:  08/08/20 0738     Site Arterial Line     Andres's Test N/A     pH, Arterial 7.449 pH units      pCO2, Arterial 33.7 mm Hg      pO2, Arterial 85.5 mm Hg      HCO3, Arterial 23.4 mmol/L      Base Excess, Arterial -0.2 mmol/L      Comment: Serial Number: [id number]:  [id number]        O2 Saturation, Arterial 97.0 %      CO2 Content 24.4 mmol/L      Barometric Pressure for Blood Gas --     Comment: N/A        Modality BiPap     FIO2 30 %      Ventilator Mode ;NIV     Hemodilution No    Blood Culture - Blood, Blood, PICC Line [[id number]]  (Abnormal) Collected:  08/05/20 1107    Specimen:  Blood, PICC Line Updated:  08/08/20 0649     Blood Culture Staphylococcus, coagulase negative     Comment: Probable contaminant requires clinical correlation, susceptibility not performed unless requested by physician.          Isolated from Anaerobic Bottle     Gram Stain Anaerobic Bottle Gram positive cocci in clusters    POC Glucose Once [[id number]]  (Abnormal) Collected:  08/08/20 0603    Specimen:  Blood Updated:  08/08/20 0605     Glucose 118 mg/dL      Comment: Serial Number: [id number]:  [id number]       BUN [[id number]]  (Abnormal) Collected:  08/08/20 0409    Specimen:  Blood Updated:  08/08/20 0449     BUN 66 mg/dL     Magnesium [[id number]]  (Normal) Collected:  08/08/20 0409    Specimen:  Blood Updated:  08/08/20 0442     Magnesium 2.3 mg/dL     Phosphorus [[id number]]  (Abnormal) Collected:  08/08/20 0409    Specimen:  Blood Updated:  08/08/20 0442     Phosphorus 4.7 mg/dL     Comprehensive Metabolic Panel [[id number]]  (Abnormal) Collected:  08/08/20 0409    Specimen:  Blood Updated:  08/08/20 0442     Glucose 95 mg/dL      BUN --     Comment: Testing performed by alternate method        Creatinine 3.82 mg/dL      Sodium 139  mmol/L      Potassium 5.4 mmol/L      Chloride 99 mmol/L      CO2 24.0 mmol/L      Calcium 9.1 mg/dL      Total Protein 6.4 g/dL      Albumin 2.60 g/dL      ALT (SGPT) 341 U/L      AST (SGOT) 695 U/L      Alkaline Phosphatase 580 U/L      Total Bilirubin 0.5 mg/dL      eGFR Non African Amer 15 mL/min/1.73      Globulin 3.8 gm/dL      A/G Ratio 0.7 g/dL      BUN/Creatinine Ratio --     Comment: Testing not performed        Anion Gap 16.0 mmol/L     Narrative:       GFR Normal >60  Chronic Kidney Disease <60  Kidney Failure <15      Protime-INR [[id number]]  (Normal) Collected:  08/08/20 0409    Specimen:  Blood Updated:  08/08/20 0431     Protime 11.6 Seconds      INR 1.07    CBC & Differential [[id number]] Collected:  08/08/20 0410    Specimen:  Blood Updated:  08/08/20 0420    Narrative:       The following orders were created for panel order CBC & Differential.  Procedure                               Abnormality         Status                     ---------                               -----------         ------                     CBC Auto Differential[[id number]]        Abnormal            Final result                 Please view results for these tests on the individual orders.    CBC Auto Differential [[id number]]  (Abnormal) Collected:  08/08/20 0410    Specimen:  Blood Updated:  08/08/20 0420     WBC 15.40 10*3/mm3      RBC 3.81 10*6/mm3      Hemoglobin 10.8 g/dL      Hematocrit 33.4 %      MCV 87.7 fL      MCH 28.3 pg      MCHC 32.3 g/dL      RDW 16.0 %      RDW-SD 49.4 fl      MPV 8.7 fL      Platelets 152 10*3/mm3      Neutrophil % 91.4 %      Lymphocyte % 1.4 %      Monocyte % 6.7 %      Eosinophil % 0.0 %      Basophil % 0.5 %      Neutrophils, Absolute 14.00 10*3/mm3      Lymphocytes, Absolute 0.20 10*3/mm3      Monocytes, Absolute 1.00 10*3/mm3      Eosinophils, Absolute 0.00 10*3/mm3      Basophils, Absolute 0.10 10*3/mm3      nRBC 0.1 /100 WBC     Hepatitis Panel, Acute [[id number]] Collected:  08/07/20  1248    Specimen:  Blood Updated:  08/08/20 0100    Narrative:       The following orders were created for panel order Hepatitis Panel, Acute.  Procedure                               Abnormality         Status                     ---------                               -----------         ------                     Hepatitis Panel, Acute[[id number]]       Normal              Final result               Hep B Confirmation Tube[[id number]]                          Final result                 Please view results for these tests on the individual orders.    Hep B Confirmation Tube [[id number]] Collected:  08/07/20 1248    Specimen:  Blood Updated:  08/08/20 0100     Extra Tube Hold for add-ons.     Comment: Auto resulted.       POC Glucose Once [[id number]]  (Normal) Collected:  08/08/20 0015    Specimen:  Blood Updated:  08/08/20 0015     Glucose 97 mg/dL      Comment: Serial Number: [id number]:  [id number]       Lactate Dehydrogenase [[id number]]  (Abnormal) Collected:  08/07/20 1018    Specimen:  Blood Updated:  08/07/20 2112     LDH 1,059 U/L     Alpha - 1 - Antitrypsin [[id number]]  (Abnormal) Collected:  08/07/20 1018    Specimen:  Blood Updated:  08/07/20 2032     ALPHA -1 ANTITRYPSIN 289 mg/dL     Ceruloplasmin [[id number]]  (Abnormal) Collected:  08/07/20 1018    Specimen:  Blood Updated:  08/07/20 2032     Ceruloplasmin 33 mg/dL     Gamma GT [[id number]]  (Normal) Collected:  08/07/20 1018    Specimen:  Blood Updated:  08/07/20 2008     GGT 36 U/L     POC Glucose Once [[id number]]  (Abnormal) Collected:  08/07/20 1708    Specimen:  Blood Updated:  08/07/20 1712     Glucose 150 mg/dL      Comment: Serial Number: [id number]:  [id number]             Imaging Results (Last 24 Hours)     Procedure Component Value Units Date/Time    XR Chest 1 View [[id number]] Collected:  08/07/20 1551     Updated:  08/07/20 1554    Narrative:       DATE OF EXAM:  8/7/2020 3:46 PM     PROCEDURE:  XR CHEST 1 VW-      INDICATIONS:  Line placement     COMPARISON:  08/04/2020     TECHNIQUE:   Single radiographic view of the chest was obtained.     FINDINGS:  Placement of right IJ Shiley catheter in satisfactory position. NG tube  courses through the thorax. Right upper extremity PICC line in  satisfactory position. Status post CABG. Bibasilar atelectasis. No  evidence of pneumothorax. Regional skeleton is unremarkable.       Impression:          1. Right IJ Shiley catheter in satisfactory position without evidence of  pneumothorax.     2. Bibasilar atelectasis.     Electronically Signed By-Hayden Rodriguez On:8/7/2020 3:52 PM  This report was finalized on [id number] by  Hayden Rodriguez, .            ASSESSMENT:  Elevated liver enzymes -I suspect ischemia versus drug-induced liver injury.  Continue to monitor  Hematochezia/melena -I suspect this is either a peptic ulcer disease/gastritis/esophagitis versus ischemic colitis    Dyspnea    Dissection of thoracic aorta (CMS/HCC)      PLAN:  Continue to monitor hemoglobin.  Continue IV PPI twice daily.  Hemodynamic support as needed.  Would be incredibly high risk for any endoscopic intervention.  We will avoid this for right now.  Little else we can help with right now, we will be available as needed  Tim Loyola MD  08/08/20  15:38

## 2020-08-09 NOTE — PROGRESS NOTES
"                                                                                                                                      Nephrology  Progress Note                                        [facility]    Patient Identification    Name: Adolfo Alejandro  Age: 75 y.o.  Sex: male  :  1945  MRN: [id number]      DATE OF SERVICE:  2020        Subective    Still on oxygen  Blood pressure better off Levophed     Objective   Scheduled Meds:    budesonide 0.5 mg Nebulization BID - RT   docusate 100 mg Oral BID   furosemide 40 mg Intravenous Q12H   insulin lispro 0-7 Units Subcutaneous Q6H   ipratropium-albuterol 3 mL Nebulization 4x Daily - RT   lamoTRIgine 100 mg Oral Nightly   lamoTRIgine 200 mg Oral Daily   Linezolid 600 mg Intravenous Q12H   methylPREDNISolone sodium succinate 40 mg Intravenous Q12H   pantoprazole 40 mg Intravenous Q12H   [START ON 2020] PARoxetine 40 mg Oral Daily   piperacillin-tazobactam 3.375 g Intravenous Q12H   polyethylene glycol 17 g Oral Daily   sodium chloride 10 mL Intravenous Q12H         Continuous Infusions:       PRN Meds:•  acetaminophen **OR** acetaminophen  •  albumin human  •  aluminum-magnesium hydroxide-simethicone  •  bisacodyl  •  dextrose  •  dextrose  •  dextrose  •  dextrose  •  glucagon (human recombinant)  •  heparin (porcine)  •  hydrALAZINE  •  insulin lispro **AND** insulin lispro  •  ipratropium-albuterol  •  magnesium hydroxide  •  magnesium sulfate **OR** magnesium sulfate in D5W 1g/100mL (PREMIX)  •  ondansetron **OR** ondansetron  •  potassium chloride **OR** potassium chloride **OR** potassium chloride  •  [COMPLETED] Insert peripheral IV **AND** sodium chloride  •  sodium chloride     Exam:  /70   Pulse 109   Temp 99.8 °F (37.7 °C) (Axillary)   Resp 23   Ht 165.1 cm (65\")   Wt 59.1 kg (130 lb 4.7 oz)   SpO2 95%   BMI 21.68 kg/m²     Intake/Output last 3 shifts:  I/O last 3 completed shifts:  In: 2822.9 " [I.V.:1623.9; Other:340; NG/GT:859]  Out: 2075 [Urine:1475; Other:600]    Intake/Output this shift:  No intake/output data recorded.    Physical exam:  General Appearance: On BiPAP  Head:  Normocephalic, without obvious abnormality, atraumatic  Eyes:  PERRL, conjunctiva/corneas clear     Neck:  Supple,  no adenopathy;      Lungs:  Decreased BS occasion ronchi  Heart:  Regular rate and rhythm, S1 and S2 normal  Abdomen:  Soft, non-tender, bowel sounds active   Extremities: trace edema  Pulses: 2+ and symmetric all extremities  Skin:  No rashes or lesions       Data Review:  All labs (24hrs):   Recent Results (from the past 24 hour(s))   Blood Gas, Arterial    Collection Time: 08/08/20  7:34 AM   Result Value Ref Range    Site Arterial Line     Andres's Test N/A     pH, Arterial 7.449 7.350 - 7.450 pH units    pCO2, Arterial 33.7 (L) 35.0 - 48.0 mm Hg    pO2, Arterial 85.5 83.0 - 108.0 mm Hg    HCO3, Arterial 23.4 21.0 - 28.0 mmol/L    Base Excess, Arterial -0.2 (L) 0.0 - 3.0 mmol/L    O2 Saturation, Arterial 97.0 94.0 - 98.0 %    CO2 Content 24.4 22 - 29 mmol/L    Barometric Pressure for Blood Gas      Modality BiPap     FIO2 30 %    Ventilator Mode ;NIV     Hemodilution No    POC Glucose Once    Collection Time: 08/08/20 12:26 PM   Result Value Ref Range    Glucose 96 70 - 105 mg/dL   POC Glucose Once    Collection Time: 08/08/20  5:34 PM   Result Value Ref Range    Glucose 158 (H) 70 - 105 mg/dL   POC Glucose Once    Collection Time: 08/08/20  5:45 PM   Result Value Ref Range    Glucose 153 (H) 70 - 105 mg/dL   POC Glucose Once    Collection Time: 08/09/20 12:03 AM   Result Value Ref Range    Glucose 102 70 - 105 mg/dL   Magnesium    Collection Time: 08/09/20  4:00 AM   Result Value Ref Range    Magnesium 2.1 1.6 - 2.4 mg/dL   Phosphorus    Collection Time: 08/09/20  4:00 AM   Result Value Ref Range    Phosphorus 3.6 2.5 - 4.5 mg/dL   Comprehensive Metabolic Panel    Collection Time: 08/09/20  4:00 AM   Result Value  Ref Range    Glucose 107 (H) 65 - 99 mg/dL    BUN      Creatinine 2.99 (H) 0.76 - 1.27 mg/dL    Sodium 138 136 - 145 mmol/L    Potassium 4.4 3.5 - 5.2 mmol/L    Chloride 98 98 - 107 mmol/L    CO2 23.0 22.0 - 29.0 mmol/L    Calcium 8.9 8.6 - 10.5 mg/dL    Total Protein 6.0 6.0 - 8.5 g/dL    Albumin 2.70 (L) 3.50 - 5.20 g/dL    ALT (SGPT) 248 (H) 1 - 41 U/L    AST (SGOT) 523 (H) 1 - 40 U/L    Alkaline Phosphatase 525 (H) 39 - 117 U/L    Total Bilirubin 0.8 0.0 - 1.2 mg/dL    eGFR Non African Amer 21 (L) >60 mL/min/1.73    Globulin 3.3 gm/dL    A/G Ratio 0.8 g/dL    BUN/Creatinine Ratio      Anion Gap 17.0 (H) 5.0 - 15.0 mmol/L   CBC Auto Differential    Collection Time: 08/09/20  4:00 AM   Result Value Ref Range    WBC 15.30 (H) 3.40 - 10.80 10*3/mm3    RBC 3.67 (L) 4.14 - 5.80 10*6/mm3    Hemoglobin 10.3 (L) 13.0 - 17.7 g/dL    Hematocrit 31.7 (L) 37.5 - 51.0 %    MCV 86.4 79.0 - 97.0 fL    MCH 28.0 26.6 - 33.0 pg    MCHC 32.4 31.5 - 35.7 g/dL    RDW 16.6 (H) 12.3 - 15.4 %    RDW-SD 51.2 37.0 - 54.0 fl    MPV 8.5 6.0 - 12.0 fL    Platelets 123 (L) 140 - 450 10*3/mm3   BUN    Collection Time: 08/09/20  4:00 AM   Result Value Ref Range    BUN 51 (H) 8 - 23 mg/dL   POC Glucose Once    Collection Time: 08/09/20  5:36 AM   Result Value Ref Range    Glucose 118 (H) 70 - 105 mg/dL          Imaging:  [unfilled]    Assessment/Plan:     Dyspnea    Dissection of thoracic aorta (CMS/HCC)     Acute kidney injury likely due to the combination of contrast-induced nephropathy and ATN and diuretic use and possible AIN from antibiotics  Hyperkalemia  Hyponatremia  Urinary retention  Acute respiratory failure  Hypoxia  Fever  Pneumonia  COPD exacerbation  Congestive heart failure  Descending thoracic aorta dissection  Hypertension with hypertensive urgency      No need for dialysis today  Discussed with the pulmonary nurse practitioner

## 2020-08-09 NOTE — PLAN OF CARE
Problem: Patient Care Overview  Goal: Plan of Care Review  Outcome: Ongoing (interventions implemented as appropriate)  Flowsheets (Taken 8/9/2020 0652)  Progress: no change  Note:   Pt stable overnight; Pt had dialysis-no fluid taken off; Pt off all gtts; Pt hypertensive overnight but controlled with medication; Pt had another bloody BM; Residuals lower and changing from dark stool looking liquid to last residuals of 150 at 0600 being tube feeds; Will continue to monitor.

## 2020-08-09 NOTE — PROGRESS NOTES
Nutrition Services    Patient Name:  Adolfo Alejandro  YOB: 1945  MRN: [id number]  Admit Date:  7/31/2020    Progress Note:    Tube feed check on. Novasource Renal documented at 25 ml/hr.  Residuals now WNL.     Labs (reviewed below):  -Hyperkalemia- resolved   -Elevated LFTs- GIs note reviewed, improved   -BUN/Crt- improved   -Hypermagnesemia- remains resolved     -Hypophosphatemia- resolved     Bowel function: Residuals 150-350 ml (< than policy of 500 ml), + BM x 2 today      Nutrition Intervention:    Secure chatted RN who reports under his shift highest residuals of 60 ml/hr. Increasing TF slightly today to 35ml/hr.     EN Prescription: Novasource Renal at 35 mL/hr + 10 mL/hr H2O flush    Results from last 7 days   Lab Units 08/09/20  0400 08/08/20  0409 08/07/20  0740 08/07/20  0438   SODIUM mmol/L 138 139 137 139   POTASSIUM mmol/L 4.4 5.4* 5.7* 5.8*   CHLORIDE mmol/L 98 99 93* 95*   CO2 mmol/L 23.0 24.0 23.0 26.0   BUN  51* 66* 108* 106*   CREATININE mg/dL 2.99* 3.82* 5.24* 5.40*   CALCIUM mg/dL 8.9 9.1 8.9 8.9   BILIRUBIN mg/dL 0.8 0.5  --  0.6   ALK PHOS U/L 525* 580*  --  592*   ALT (SGPT) U/L 248* 341*  --  334*   AST (SGOT) U/L 523* 695*  --  698*   GLUCOSE mg/dL 107* 95 119* 126*     Results from last 7 days   Lab Units 08/09/20  0400 08/08/20  0409  08/07/20  0438   MAGNESIUM mg/dL 2.1  --  2.3  --  2.7*   PHOSPHORUS mg/dL 3.6  --  4.7*  --  5.6*   HEMOGLOBIN g/dL 10.3*   < >  --    < > 11.3*   HEMATOCRIT % 31.7*   < >  --    < > 35.5*    < > = values in this interval not displayed.        Electronically signed by:  Afsaneh Rosa RD  08/09/20 15:07

## 2020-08-09 NOTE — PROGRESS NOTES
[facility]ULM/CC PROGRESS NOTE       HISTORY OF PRESENT ILLNESS:  Adolfo Alejandro is a 75 y.o. 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  Patient is with confusion, and no family is currently at bedside to provide further information regarding patient's clinical presentation.  Patient is confused, but is oriented to person and place.  Patient denies any gross complaints with the exception that he cannot breathe while on BiPAP.     In the ED, labs were obtained and as follows: Troponin <0.010, BNP 9541, glucose 108, CO2 33, WBC 6.3, hemoglobin 9.4, hematocrit 29.7.  ABG: pH 7.299, PCO2 75.1, PO2 67.0, HCO3 36.8, O2 saturation 89.6.  After ABG was obtained, patient was placed on BiPAP, in which a repeat ABG was obtained.  ABG: pH 7.290, PCO2 75.5, PaO2 112.7, HCO3 36.3, and O2 sat 97.5.  It was at this time, that patient was transitioned to AVAPS.  UA was obtained and negative.  Patient shows no evidence of infectious process at this time.  COVID-19 was checked and negative.     [facility] was contacted for admission to ICU and further evaluation and treatment.     INSPECT REPORT:  INSPECT report completed with most recent prescriptions of: Morphine sulfate extended release 15 mg tablet, 60 tablets for 30-day supply filled on 7/18/2020; oxycodone-acetaminophen  mg, 150 tablets for 30-day supply filled on 7/5/2020.       SUBJECTIVE    8/2: Confused and drowsy.  Cardene drip added.  Did not tolerate NIPPV last night.  Currently on 10 L high flow O2  8/3:  Remains on Cardene and a Precedex gtts. Confused.  5 liters of oxygen  8/4:  Off Cardene and Precedex gtts.  Remains confused and in restraints.  On 10 liters high flow nasal cannula.   8/5:  Remains confused and in restraints.  Sitter at  bedside.  On 10 Liters high flow nasal cannula.  Febrile   8/6:  Remains confused and on 10 liters nasal cannula.  On Levophed   8/7 no acute events reported.  Remains confused and restless. Not following commands. Bloody stool this am. AVAP. Levo and lasix gtts  8/8: AVAP overnight.  Remains confused and restless.  Dialysis started yesterday  8/9: HD overnight, unable to pull fluid due to marginal blood pressure.  Used AVAP nocturnal, now 2 hours on/2 hours off alternating with high flow O2.  Remains confused and restless    OBJECTIVE    Vitals:    08/09/20 0738 08/09/20 1104 08/09/20 1107 08/09/20 1138   BP:       Pulse:  104     Resp:  28 28    Temp: 99.4 °F (37.4 °C)   98.1 °F (36.7 °C)   TempSrc: Axillary   Oral   SpO2:  94%     Weight:       Height:          Intake/Output last 3 shifts:  I/O last 3 completed shifts:  In: 2822.9 [I.V.:1623.9; Other:340; NG/GT:859]  Out: 2075 [Urine:1475; Other:600]  Intake/Output this shift:  No intake/output data recorded.    PHYSICAL EXAM:       Constitutional:  Chronically ill appearing  Eyes:  PERRL, conjunctiva normal, EOMI  HENT:  Atraumatic, external ears normal, nose normal. Neck- normal range of motion, no tenderness, supple, trachea midline.  Right IJ Shiley  Respiratory:  Clear to diminished bilaterally, non-labored respirations without accessory muscle use  Cardiovascular:  RRR, no murmurs, no gallops, no rubs   GI:  Soft, nondistended, normal bowel sounds, nontender, no rebound or guarding  :   Deferred  Musculoskeletal:  No edema, no clubbing or cyanosis  Integument:  Well hydrated, no rash   Neurologic:   Drowsy and Confused. Mumbles. No lateralizing deficits  Psychiatric:   Unable to evaluate    Scheduled Meds:    budesonide 0.5 mg Nebulization BID - RT   docusate 100 mg Oral BID   furosemide 40 mg Intravenous Q12H   insulin lispro 0-7 Units Subcutaneous Q6H   ipratropium-albuterol 3 mL Nebulization 4x Daily - RT   lamoTRIgine 100 mg Oral Nightly   lamoTRIgine  200 mg Oral Daily   Linezolid 600 mg Intravenous Q12H   methylPREDNISolone sodium succinate 40 mg Intravenous Q12H   pantoprazole 40 mg Intravenous Q12H   [START ON 8/13/2020] PARoxetine 40 mg Oral Daily   piperacillin-tazobactam 3.375 g Intravenous Q12H   polyethylene glycol 17 g Oral Daily   sodium chloride 10 mL Intravenous Q12H       Continuous Infusions:       PRN Meds:•  acetaminophen **OR** acetaminophen  •  albumin human  •  aluminum-magnesium hydroxide-simethicone  •  bisacodyl  •  dextrose  •  dextrose  •  dextrose  •  dextrose  •  glucagon (human recombinant)  •  heparin (porcine)  •  hydrALAZINE  •  insulin lispro **AND** insulin lispro  •  ipratropium-albuterol  •  magnesium hydroxide  •  magnesium sulfate **OR** magnesium sulfate in D5W 1g/100mL (PREMIX)  •  ondansetron **OR** ondansetron  •  potassium chloride **OR** potassium chloride **OR** potassium chloride  •  [COMPLETED] Insert peripheral IV **AND** sodium chloride  •  sodium chloride     LABS    CBC  Results from last 7 days   Lab Units 08/09/20  0400 08/08/20  0410 08/07/20  0740 08/07/20  0438 08/06/20  0521 08/05/20  1137 08/05/20  0334 08/04/20  0508   WBC 10*3/mm3 15.30* 15.40* 14.90* 15.50* 10.60  --  22.20* 13.20*   RBC 10*6/mm3 3.67* 3.81* 3.89* 4.02* 3.55*  --  4.27 3.88*   HEMOGLOBIN g/dL 10.3* 10.8* 10.8* 11.3* 10.1*  --  12.0* 11.0*   HEMOGLOBIN, POC g/dL  --   --   --   --   --  13.8  --   --    HEMATOCRIT % 31.7* 33.4* 34.3* 35.5* 32.7*  --  38.0 34.5*   HEMATOCRIT POC %  --   --   --   --   --  41  --   --    MCV fL 86.4 87.7 88.1 88.4 92.1  --  89.0 88.8   PLATELETS 10*3/mm3 123* 152 163 201 191  --  312 321       CMP   Results from last 7 days   Lab Units 08/09/20  0400 08/08/20  0409 08/07/20  0740 08/07/20  0438 08/06/20  1634 08/06/20  0521 08/05/20  1607 08/05/20  0334 08/04/20  0508  08/03/20  0416   SODIUM mmol/L 138 139 137 139 138 135* 149* 151* 149*  --  146*   POTASSIUM mmol/L 4.4 5.4* 5.7* 5.8* 5.8* 5.3* 6.2* 5.4*  3.7   < > 3.1*   CHLORIDE mmol/L 98 99 93* 95* 95* 92* 102 102 103  --  94*   CO2 mmol/L 23.0 24.0 23.0 26.0 24.0 27.0 31.0* 33.0* 34.0*  --  38.0*   BUN  51* 66* 108* 106* 98* 95* 63* 71* 39*  --  35*   CREATININE mg/dL 2.99* 3.82* 5.24* 5.40* 5.02* 4.62* 4.52* 3.30* 1.30*  --  1.25   GLUCOSE mg/dL 107* 95 119* 126* 242* 456* 287* 151* 101*  --  134*   ALBUMIN g/dL 2.70* 2.60*  --  2.70*  --  2.70*  --  3.40* 3.40*  --  3.80   BILIRUBIN mg/dL 0.8 0.5  --  0.6  --  0.7  --  0.7 0.6  --  0.8   ALK PHOS U/L 525* 580*  --  592*  --  462*  --  269* 113  --  121*   AST (SGOT) U/L 523* 695*  --  698*  --  539*  --  118* 109*  --  63*   ALT (SGPT) U/L 248* 341*  --  334*  --  225*  --  47* 38  --  21    < > = values in this interval not displayed.       TROPONIN        CoAg  Results from last 7 days   Lab Units 08/08/20  0409   INR  1.07       ABG  Results from last 7 days   Lab Units 08/08/20  0734 08/07/20  0824 08/06/20  0933 08/05/20  1137 08/02/20  1257   PH, ARTERIAL pH units 7.449 7.367 7.296* 7.418 7.562*   PCO2, ARTERIAL mm Hg 33.7* 41.7 62.5* 48.6* 48.5*   PO2 ART mm Hg 85.5 90.1 151.8* 69.1* 135.7*   O2 SATURATION ART % 97.0 96.7 99.0* 93.6* 99.4*   BASE EXCESS ART mmol/L -0.2* -1.4* 2.5 5.6* 18.8*       Microbiology  Microbiology Results (last 10 days)     Procedure Component Value - Date/Time    Blood Culture - Blood, Hand, Right [[id number]] Collected:  08/05/20 1219    Lab Status:  Preliminary result Specimen:  Blood from Hand, Right Updated:  08/08/20 1230     Blood Culture No growth at 3 days    Legionella Antigen, Urine - Urine, Urine, Clean Catch [[id number]]  (Normal) Collected:  08/05/20 1119    Lab Status:  Final result Specimen:  Urine, Clean Catch Updated:  08/05/20 1224     LEGIONELLA ANTIGEN, URINE Negative    S. Pneumo Ag Urine or CSF - Urine, Urine, Clean Catch [[id number]]  (Abnormal) Collected:  08/05/20 1119    Lab Status:  Final result Specimen:  Urine, Clean Catch Updated:  08/05/20 1225      Strep Pneumo Ag Positive    Urine Culture - Urine, Urine, Clean Catch [[id number]]  (Abnormal)  (Susceptibility) Collected:  08/05/20 1118    Lab Status:  Final result Specimen:  Urine, Clean Catch Updated:  08/07/20 0920     Urine Culture >100,000 CFU/mL Pseudomonas aeruginosa    Susceptibility      Pseudomonas aeruginosa     SUSANA     Cefepime Susceptible     Ceftazidime Susceptible     Ciprofloxacin Intermediate     Gentamicin Susceptible     Levofloxacin Resistant     Piperacillin + Tazobactam Susceptible                    MRSA Screen, PCR (Inpatient) - Swab, Nares [[id number]]  (Abnormal) Collected:  08/05/20 1115    Lab Status:  Final result Specimen:  Swab from Nares Updated:  08/05/20 1301     MRSA PCR MRSA Detected    Respiratory Panel, PCR - Swab, Nasopharynx [[id number]]  (Normal) Collected:  08/05/20 1115    Lab Status:  Final result Specimen:  Swab from Nasopharynx Updated:  08/05/20 1237     ADENOVIRUS, PCR Not Detected     Coronavirus 229E Not Detected     Coronavirus HKU1 Not Detected     Coronavirus NL63 Not Detected     Coronavirus OC43 Not Detected     Human Metapneumovirus Not Detected     Human Rhinovirus/Enterovirus Not Detected     Influenza B PCR Not Detected     Parainfluenza Virus 1 Not Detected     Parainfluenza Virus 2 Not Detected     Parainfluenza Virus 3 Not Detected     Parainfluenza Virus 4 Not Detected     Bordetella pertussis pcr Not Detected     Influenza A H1 2009 PCR Not Detected     Chlamydophila pneumoniae PCR Not Detected     Mycoplasma pneumo by PCR Not Detected     Influenza A PCR Not Detected     Influenza A H3 Not Detected     Influenza A H1 Not Detected     RSV, PCR Not Detected     Bordetella parapertussis PCR Not Detected    Narrative:       The coronavirus on the RVP is NOT COVID-19 and is NOT indicative of infection with COVID-19.     Blood Culture - Blood, Blood, PICC Line [[id number]]  (Abnormal) Collected:  08/05/20 1107    Lab Status:  Final result Specimen:  Blood,  PICC Line Updated:  08/08/20 0649     Blood Culture Staphylococcus, coagulase negative     Comment: Probable contaminant requires clinical correlation, susceptibility not performed unless requested by physician.          Isolated from Anaerobic Bottle     Gram Stain Anaerobic Bottle Gram positive cocci in clusters    Blood Culture ID, PCR - Blood, Blood, PICC Line [[id number]]  (Abnormal) Collected:  08/05/20 1107    Lab Status:  Final result Specimen:  Blood, PICC Line Updated:  08/07/20 1430     BCID, PCR Staphylococcus spp, not aureus. Identification by BCID PCR.     BOTTLE TYPE Anaerobic Bottle    COVID-19, [facility] IN-HOUSE,NP Swab (NO TRANSPORT MEDIA) 2 HR TAT - Swab, Nasopharynx [[id number]]  (Normal) Collected:  08/01/20 0014    Lab Status:  Final result Specimen:  Swab from Nasopharynx Updated:  08/01/20 0040     COVID19 Not Detected    Narrative:       Fact sheet for providers: https://www.fda.gov/media/767465/download     Fact sheet for patients: https://www.fda.gov/media/247538/download          IMAGING & OTHER STUDIES    Imaging Results (Last 72 Hours)     Procedure Component Value Units Date/Time    XR Chest 1 View [[id number]] Collected:  08/07/20 1551     Updated:  08/07/20 1554    Narrative:       DATE OF EXAM:  8/7/2020 3:46 PM     PROCEDURE:  XR CHEST 1 VW-     INDICATIONS:  Line placement     COMPARISON:  08/04/2020     TECHNIQUE:   Single radiographic view of the chest was obtained.     FINDINGS:  Placement of right IJ Shiley catheter in satisfactory position. NG tube  courses through the thorax. Right upper extremity PICC line in  satisfactory position. Status post CABG. Bibasilar atelectasis. No  evidence of pneumothorax. Regional skeleton is unremarkable.       Impression:          1. Right IJ Shiley catheter in satisfactory position without evidence of  pneumothorax.     2. Bibasilar atelectasis.     Electronically Signed By-Hayden Rodriguez On:8/7/2020 3:52 PM  This report was finalized on  [id number] by  Hayden Rodriguez, .    US Gallbladder [[id number]] Collected:  08/07/20 1330     Updated:  08/07/20 1335    Narrative:       US GALLBLADDER-     Date of Exam: 8/7/2020 12:52 PM     Indication: Elevated LFTs.     Comparison: None available.     Technique: Transverse and sagittal ultrasound images of the right upper  quadrant were obtained. Doppler evaluation was also conducted.     FINDINGS:  The liver is normal in echotexture. No masses are identified. Status  post cholecystectomy. There is mild intra and extra hepatic biliary duct  dilation with common bile duct measuring up to 1.4 cm.  No obstructing  stone or mass is identified and this likely secondary to  postcholecystectomy changes. There is normal flow within the hepatic and  portal veins. Trace amount of perirenal fluid is identified. The right  kidney is otherwise unchanged from prior.       Impression:       Biliary ductal dilation is favored to be secondary to  postcholecystectomy changes.     Trace amount of fluid adjacent to the right kidney of uncertain etiology  or clinical significance.     Electronically Signed By-Hayden Rodriguez On:8/7/2020 1:33 PM  This report was finalized on [id number] by  Hayden Rodriguez, .        Results for orders placed during the hospital encounter of 03/15/20   Adult Transthoracic Echo Complete W/ Cont if Necessary Per Protocol    Narrative · The following left ventricular wall segments are hypokinetic: mid   anterior, apical anterior, basal anterolateral, mid anterolateral, apical   lateral, basal inferolateral, mid inferolateral, apical inferior, mid   inferior, apical septal, basal inferoseptal, mid inferoseptal, apex   hypokinetic, mid anteroseptal, basal anterior, basal inferior and basal   inferoseptal.  · Left ventricular systolic function is moderately decreased.  · Right ventricular cavity is borderline dilated.  · Left atrial cavity size is borderline dilated.  · Mild to moderate aortic valve  regurgitation is present.             Acute respiratory failure with hypercapnia and hypoxia  -Likely multifactorial, with CHF/COPD exacerbation  -ABG and CXRs-reviewed  -Oxygen supplementation as needed, titrate FiO2 to maintain patient's oxygen saturations greater than 91%  - 8 L high flow O2 alternated with AVAP, 2 hours on and 2 hours off as tolerated.  ABGs reviewed   -taper steroids    Septic shock  -Given additional IVF 1.5 liters 8/5.  Limited IV fluids due to resp status and worsening creatinine.  BNP 9541  -possible PNA, start Zosyn for possible aspiration.  Continued empiric antibiotics with Zosyn to Cefepime which was de-escalated to Zyvox and ceftriaxone.  Due to worsening condition he was switched  back to cefepime and Zyvox to cover for Pseudomonas  -Blood culture pending  -Urine culture with pseudomonas aeruginosa  -check urine antigens, +strep pna, neg legionella  -resp viral panel neg  -procal 6, now 29.65  -Levophed currently off, maintaining MAP 65  -Midodrine added, now weaned off due to hypertension     Acute COPD exacerbation  -COVID-19 negative  -CXRs reviewed  -on IV steroids   -Continue Pulmicort twice daily, DuoNeb scheduled and as needed, Daliresp  -Recommend follow-up in outpatient pulmonary clinic    Acute exacerbation of heart failure with reduced ejection fraction  -BNP 9541 on admission  -Continue scheduled diuresis, decreased to daily.  Monitor creatinine  -Reviewed previous Echo, EF 44%    Dissection of the descending thoracic aorta  -CV surgery consulted, evaluated CT and CTA with the opinion that the dissection is isolated to the ascending aorta.  The flap looks a little bit thick possibly representing a chronic dissection.  The patient is a poor surgical candidate which was discussed by Dr. Isaacs with the patient's wife and surgery is not recommended.  Comanagement with strict blood pressure control.    -Cardene drip, now off.   -Restart home meds as tolerated by blood  "pressure     Hypertensive urgency, history of essential hypertension  -D/C Losartan due to DEE.  D/C metoprolol and Norvasc  -IV Lopressor x 1 dose given, no further BB 2/2 SB  -Has had marginal to low blood pressures, reintroduce home medications as tolerated     Sinus bradycardia, resolved  -Hold amiodarone and metoprolol XL  -No evidence of chronic anticoagulation  -EKG reviewed     CAD, S/P CABG  -Troponin <0.010, monitor serial troponins, remained flat  -Lipid profile reviewed     Confusion/Altered mental status; H/O memory loss 2/2 TBI  -Only 1 SIRS criteria, normal WBC, afebrile  -UA negative  -UDS reviewed, positive for opiates  -OFF Precedex gtt   -CT of the head 8/1/2020 reviewed, no acute intracranial abnormality, stable advanced chronic small vessel ischemic changes  -MRI of the brain pending    DEE  -consult to renal  -lasix gtt per renal now off  -renal US - no hydronephrosis   -emery cath in place  -Shiley placed, HD started 8/7/2020    ? Free air under diaphragm  -CT Abd/pelvis reviewed   -lactic 2, improved      Bipolar affective disorder  -Lamictal, cont  -restart Paxil, holding Seroquel  -Risperdal d/c    Chronic pain  -resume home pain medication   -monitor for withdrawal symptoms    Possible GIB  -GI consulted, no interventions planned at this time as patient would be \"incredibly high risk\".    -Continue PPI twice daily       Accuchecks with SSI  Code Status: CPR, Full Interventions  VTE Prophylaxis: discontinue Heparin due to GIB now SCDs  PUD Prophylaxis: Pepcid D/C, could be contributing to delirium.  Now on Protonix    Added bowel regimen - stool burden on CT abd    DREAD PICC, will discontinue if able to stay off pressors  Right IJ My 8/7/2020    Emery cath   Feeding tube  PICC    Arterial line, radial-discontinued    Attending physician statement:  High risk   Above note scribed by nurse practitioner for me and later reviewed for accuracy. I've examined the patient and reviewed all labs " and images.   I have directly participated in the evaluation and management of this patient.  Robbie Holden MD  Pulmonary and [facility]  [facility]

## 2020-08-10 NOTE — DISCHARGE SUMMARY
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, CAD, HTN, and anemia       Date of Admission: 7/31/2020 10:36 PM  Social History     Tobacco Use   • Smoking status: Former Smoker     Types: Cigarettes   • Smokeless tobacco: Never Used   Substance Use Topics   • Alcohol use: Not Currently     Frequency: Never     Comment: former ETOH abuse Hx   • Drug use: Not Currently           Presenting Problem/History of Present Illness  COPD exacerbation (CMS/formerly [facility]) [J44.1]  Acute on chronic respiratory failure with hypoxia and hypercapnia (CMS/formerly [facility]) [J96.21, J96.22]  Altered mental status, unspecified altered mental status type [R41.82]  Dyspnea, unspecified type [R06.00]      Hospital Course  Information obtained and some copied directly from EMR.  Admit 07/31/20    Per H&P:  \"Adolfo Alejandro is a 75 y.o. 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  Patient is with confusion, and no family is currently at bedside to provide further information regarding patient's clinical presentation.  Patient is confused, but is oriented to person and place.  Patient denies any gross complaints with the exception that he cannot breathe while on BiPAP.     In the ED, labs were obtained and as follows: Troponin <0.010, BNP 9541, glucose 108, CO2 33, WBC 6.3, hemoglobin 9.4, hematocrit 29.7.  ABG: pH 7.299, PCO2 75.1, PO2 67.0, HCO3 36.8, O2 saturation 89.6.  After ABG was obtained, " "patient was placed on BiPAP, in which a repeat ABG was obtained.  ABG: pH 7.290, PCO2 75.5, PaO2 112.7, HCO3 36.3, and O2 sat 97.5.  It was at this time, that patient was transitioned to AVAPS.  UA was obtained and negative.  Patient shows no evidence of infectious process at this time.  COVID-19 was checked and negative.     [facility] was contacted for admission to ICU and further evaluation and treatment.     INSPECT REPORT:  INSPECT report completed with most recent prescriptions of: Morphine sulfate extended release 15 mg tablet, 60 tablets for 30-day supply filled on 7/18/2020; oxycodone-acetaminophen  mg, 150 tablets for 30-day supply filled on 7/5/2020.\"                     Acute respiratory failure with hypercapnia and hypoxia  -Likely multifactorial, with CHF/COPD exacerbation  -ABGs and CXRs-reviewed  -Oxygen supplementation as needed, titrate FiO2 to maintain patient's oxygen saturations greater than 91%  -5 L high flow O2, tolerated BIPAP overnight   -taper steroids     Septic shock  -Given additional IVF 1.5 liters 8/5.  Limited IV fluids due to resp status and worsening creatinine.  BNP 9541  -possible PNA, start Zosyn for possible aspiration.  Continued empiric antibiotics with Zosyn to Cefepime which was de-escalated to Zyvox and ceftriaxone.  Due to worsening condition he was switched  back to Zosyn and Zyvox to cover for Pseudomonas  -Blood culture negative to date  -Urine culture with pseudomonas aeruginosa  -check urine antigens, +strep pna, neg legionella  -resp viral panel neg  -procal 6, then 29.65  -Levophed currently off, maintaining MAP 65  -Midodrine added, now weaned off due to hypertension    Pseudomonas UTI  -on Zosyn and Zyvox      Acute COPD exacerbation  -COVID-19 negative  -CXRs reviewed  -on IV steroids   -Continue Pulmicort twice daily, DuoNeb scheduled and as needed, Daliresp  -Recommend follow-up in outpatient pulmonary clinic     Acute exacerbation of heart failure with reduced " ejection fraction  -BNP 9541 on admission  -Continue scheduled diuresis, decreased to daily.  Monitor creatinine  -Reviewed previous Echo, EF 44%     Dissection of the descending thoracic aorta  -CV surgery consulted, evaluated CT and CTA with the opinion that the dissection is isolated to the ascending aorta.  The flap looks a little bit thick possibly representing a chronic dissection.  The patient is a poor surgical candidate which was discussed by Dr. Isaacs with the patient's wife and surgery is not recommended.  Comanagement with strict blood pressure control.    -Cardene drip, now off.   -Restart home meds as tolerated by blood pressure     Hypertensive urgency, history of essential hypertension  -D/C Losartan due to DEE.  D/C metoprolol and Norvasc  -IV Lopressor x 1 dose given, no further BB 2/2 SB  -Has had marginal to low blood pressures, reintroduce home medications as tolerated     Sinus bradycardia, resolved  -Hold amiodarone and metoprolol XL  -No evidence of chronic anticoagulation  -EKG reviewed     CAD, S/P CABG  -Troponin <0.010, monitor serial troponins, remained flat  -Lipid profile reviewed     Confusion/Altered mental status; H/O memory loss 2/2 TBI  -Only 1 SIRS criteria, normal WBC, afebrile  -UA negative  -UDS reviewed, positive for opiates  -OFF Precedex gtt   -CT of the head 8/1/2020 reviewed, no acute intracranial abnormality, stable advanced chronic small vessel ischemic changes  -MRI of the brain without evidence of hemorrhage, mass effect, or acute ischemia     DEE  -consult to renal  -lasix gtt per renal now off  -renal US - no hydronephrosis   -emery cath in place  -Shiley placed, HD started 8/7/2020     ? Free air under diaphragm  -CT Abd/pelvis reviewed and no evidence of free air  -lactic 2, improved      Bipolar affective disorder  -Lamictal, cont  -restart Paxil, holding Seroquel  -Risperdal d/c     Chronic pain  -resume home pain medication   -monitor for withdrawal  "symptoms     Possible GIB  -GI consulted, no interventions planned at this time as patient would be \"incredibly high risk\".    -Continue PPI twice daily        Accuchecks with SSI  Code Status: CPR, Full Interventions  VTE Prophylaxis: discontinue Heparin due to GIB now SCDs  PUD Prophylaxis: Pepcid D/C, could be contributing to delirium.  Now on Protonix     Bowel regimen - stool burden on CT abd        This patient remains confused through-out his stay and on supplemental oxygen.  He was volume overloaded and received diuretics.  Eventually he required HD per renal due to worsening kidney function.  CV surgery evaluated him for a possible dissection of the descending thoracic aorta.  It was felt to be chronic and surgery was not recommended.  Blood pressure was controlled with PO medication and at one point he was on a Cardene drip.  He was treated for Pseudomonas aeruginosa UTI and Strep pneumoniae PNA with Zosyn and Zyvox.  He received bronchodilators.   He was on and off vasopressor support for hypotension due to sepsis.  Midodrine was added. It was felt he was aspirating and a feeding tube was placed.  He received tube feeds per nutrition recommendations    He had some bloody stool reported and GI was consulted.  No intervention was completed as he was high risk.  His hemoglobin remained stable.        The patient had an episode of emesis with likely aspiration.  He became hypoxic and bradycardic and had a cardiac arrest.  He was intubated.  He received CPR and medication per ACLS.  He received shock for V-tach.  He had return to ROSC and then coded a second time again with return to ROSC.  Family was present and the patients spouse changed his code status to DNR.       Procedures Performed:    08/07 1515 Note By: Robbie Holden MD    Consults:   Consults     Date and Time Order Name Status Description    8/7/2020 0737 Inpatient Gastroenterology Consult Completed     8/5/2020 0919 Inpatient Nephrology " Consult Completed     8/2/2020 1936 Inpatient Palliative Care MD Consult Completed     8/2/2020 0716 Inpatient Cardiothoracic Surgery Consult      8/1/2020 0830 Inpatient Hospitalist Consult            Labs:    Lab Results (last 24 hours)     Procedure Component Value Units Date/Time    Comprehensive Metabolic Panel [[id number]]  (Abnormal) Collected:  08/10/20 0924    Specimen:  Blood Updated:  08/10/20 1012     Glucose 119 mg/dL      BUN --     Comment: Testing performed by alternate method        Creatinine 4.45 mg/dL      Sodium 147 mmol/L      Potassium 4.8 mmol/L      Chloride 100 mmol/L      CO2 19.0 mmol/L      Calcium 8.4 mg/dL      Total Protein 5.2 g/dL      Albumin 2.40 g/dL      ALT (SGPT) 145 U/L      AST (SGOT) 344 U/L      Alkaline Phosphatase 432 U/L      Total Bilirubin 0.8 mg/dL      eGFR Non African Amer 13 mL/min/1.73      Comment: <15 Indicative of kidney failure.        eGFR   Amer --     Comment: <15 Indicative of kidney failure.        Globulin 2.8 gm/dL      A/G Ratio 0.9 g/dL      BUN/Creatinine Ratio --     Comment: Testing not performed        Anion Gap 28.0 mmol/L     Narrative:       GFR Normal >60  Chronic Kidney Disease <60  Kidney Failure <15      BUN [[id number]] Collected:  08/10/20 0924    Specimen:  Blood Updated:  08/10/20 1012    Lactic Acid, Plasma [[id number]] Collected:  08/10/20 0924    Specimen:  Blood Updated:  08/10/20 1001    Protime-INR [[id number]] Collected:  08/10/20 0924    Specimen:  Blood Updated:  08/10/20 0956    CBC Auto Differential [[id number]]  (Abnormal) Collected:  08/10/20 0924    Specimen:  Blood Updated:  08/10/20 0955     WBC 11.90 10*3/mm3      RBC 3.05 10*6/mm3      Hemoglobin 8.6 g/dL      Hematocrit 27.6 %      MCV 90.5 fL      MCH 28.3 pg      MCHC 31.3 g/dL      RDW 16.6 %      RDW-SD 53.4 fl      MPV 9.0 fL      Platelets 96 10*3/mm3     Blood Gas, Arterial [[id number]]  (Abnormal) Collected:  08/10/20 0925    Specimen:  Arterial Blood  Updated:  08/10/20 0941     Site Left Radial     Andres's Test Positive     pH, Arterial 7.136 pH units      pCO2, Arterial 75.2 mm Hg      pO2, Arterial 76.5 mm Hg      HCO3, Arterial 25.4 mmol/L      Base Excess, Arterial -4.5 mmol/L      Comment: Serial Number: [id number]:  [id number]        O2 Saturation, Arterial 89.3 %      CO2 Content 27.7 mmol/L      Barometric Pressure for Blood Gas --     Comment: N/A        Modality Adult Vent     FIO2 100 %      Ventilator Mode ;AC     Set Tidal Volume 450     PEEP 15     Hemodilution Yes     Respiratory Rate 26    Scan Slide [[id number]] Collected:  08/10/20 0924    Specimen:  Blood Updated:  08/10/20 0941    POC Lactate [[id number]]  (Abnormal) Collected:  08/10/20 0925    Specimen:  Blood Updated:  08/10/20 0938     Lactate 8.8 mmol/L      Comment: Serial Number: [id number]:  [id number]       POCT Electrolytes +HGB +HCT [[id number]]  (Abnormal) Collected:  08/10/20 0925    Specimen:  Blood Updated:  08/10/20 0938     Sodium 146 mmol/L      POC Potassium 4.3 mmol/L      Ionized Calcium 0.89 mmol/L      Comment: Serial Number: [id number]:  [id number]        Glucose 104 mg/dL      Hematocrit 29 %      Hemoglobin 9.8 g/dL     POC Glucose Once [[id number]]  (Abnormal) Collected:  08/10/20 0925    Specimen:  Blood Updated:  08/10/20 0933     Glucose 104 mg/dL      Comment: Serial Number: [id number]:  [id number]       CBC & Differential [[id number]] Collected:  08/10/20 0924    Specimen:  Blood Updated:  08/10/20 0932    Narrative:       The following orders were created for panel order CBC & Differential.  Procedure                               Abnormality         Status                     ---------                               -----------         ------                     CBC Auto Differential[[id number]]        Abnormal            Preliminary result           Please view results for these tests on the individual orders.    CBC & Differential [[id number]] Collected:  08/10/20 0459     Specimen:  Blood Updated:  08/10/20 0816    Narrative:       The following orders were created for panel order CBC & Differential.  Procedure                               Abnormality         Status                     ---------                               -----------         ------                     CBC Auto Differential[[id number]]        Abnormal            Final result                 Please view results for these tests on the individual orders.    CBC Auto Differential [[id number]]  (Abnormal) Collected:  08/10/20 0459    Specimen:  Blood Updated:  08/10/20 0816     WBC 19.40 10*3/mm3      RBC 3.71 10*6/mm3      Hemoglobin 10.2 g/dL      Hematocrit 31.9 %      MCV 86.0 fL      MCH 27.5 pg      MCHC 32.0 g/dL      RDW 16.5 %      RDW-SD 50.3 fl      MPV 8.8 fL      Platelets 137 10*3/mm3     Scan Slide [[id number]] Collected:  08/10/20 0459    Specimen:  Blood Updated:  08/10/20 0816     Scan Slide --     Comment: See Manual Differential Results       Manual Differential [[id number]]  (Abnormal) Collected:  08/10/20 0459    Specimen:  Blood Updated:  08/10/20 0816     Neutrophil % 94.0 %      Lymphocyte % 1.0 %      Monocyte % 5.0 %      Neutrophils Absolute 18.24 10*3/mm3      Lymphocytes Absolute 0.19 10*3/mm3      Monocytes Absolute 0.97 10*3/mm3      Anisocytosis Slight/1+     WBC Morphology Normal     Large Platelets Slight/1+    BUN [[id number]]  (Abnormal) Collected:  08/10/20 0459    Specimen:  Blood Updated:  08/10/20 0611     BUN 94 mg/dL     Phosphorus [[id number]]  (Abnormal) Collected:  08/10/20 0459    Specimen:  Blood Updated:  08/10/20 0608     Phosphorus 5.7 mg/dL     Magnesium [[id number]]  (Abnormal) Collected:  08/10/20 0459    Specimen:  Blood Updated:  08/10/20 0606     Magnesium 2.6 mg/dL     Comprehensive Metabolic Panel [[id number]]  (Abnormal) Collected:  08/10/20 0459    Specimen:  Blood Updated:  08/10/20 0606     Glucose 161 mg/dL      BUN --     Comment: Testing performed by  alternate method        Creatinine 4.60 mg/dL      Sodium 140 mmol/L      Potassium 4.7 mmol/L      Comment: Specimen hemolyzed.  Results may be affected.        Chloride 96 mmol/L      CO2 20.0 mmol/L      Calcium 8.9 mg/dL      Total Protein 6.2 g/dL      Albumin 2.80 g/dL      ALT (SGPT) 175 U/L      AST (SGOT) 409 U/L      Alkaline Phosphatase 481 U/L      Total Bilirubin 0.8 mg/dL      eGFR Non African Amer 12 mL/min/1.73      Comment: <15 Indicative of kidney failure.        eGFR   Amer --     Comment: <15 Indicative of kidney failure.        Globulin 3.4 gm/dL      A/G Ratio 0.8 g/dL      BUN/Creatinine Ratio --     Comment: Testing not performed        Anion Gap 24.0 mmol/L     Narrative:       GFR Normal >60  Chronic Kidney Disease <60  Kidney Failure <15      POC Glucose Once [[id number]]  (Abnormal) Collected:  08/10/20 0457    Specimen:  Blood Updated:  08/10/20 0458     Glucose 155 mg/dL      Comment: Serial Number: [id number]:  [id number]       POC Glucose Once [[id number]]  (Abnormal) Collected:  08/10/20 0052    Specimen:  Blood Updated:  08/10/20 0116     Glucose 126 mg/dL      Comment: Serial Number: [id number]:  [id number]       POC Glucose Once [[id number]]  (Abnormal) Collected:  08/09/20 1726    Specimen:  Blood Updated:  08/09/20 1728     Glucose 162 mg/dL      Comment: Serial Number: [id number]:  [id number]       Ammonia [[id number]]  (Abnormal) Collected:  08/09/20 1600    Specimen:  Blood Updated:  08/09/20 1626     Ammonia 121 umol/L     Blood Culture - Blood, Hand, Right [[id number]] Collected:  08/05/20 1219    Specimen:  Blood from Hand, Right Updated:  08/09/20 1230     Blood Culture No growth at 4 days    POC Glucose Once [[id number]]  (Abnormal) Collected:  08/09/20 1205    Specimen:  Blood Updated:  08/09/20 1206     Glucose 125 mg/dL      Comment: Serial Number: [id number]:  [id number]               Imaging and Other Studies:  Imaging Results (Last 72  Hours)     Procedure Component Value Units Date/Time    XR Chest 1 View [[id number]] Collected:  08/10/20 1003     Updated:  08/10/20 1010    Narrative:          DATE OF EXAM:   8/10/2020 9:20 AM     PROCEDURE:   XR CHEST 1 VW-     INDICATIONS:   code 4; R06.00-Dyspnea, unspecified; J44.1-Chronic obstructive pulmonary  disease with (acute) exacerbation; J96.21-Acute and chronic respiratory  failure with hypoxia; J96.22-Acute and chronic respiratory failure with  hypercapnia; R41.82-Altered mental status, unspecified; A41.9-Sepsis,  unspecified organism; R65.21-Severe sepsis with septic shock     COMPARISON:  8/7/2020     TECHNIQUE:   Portable chest radiograph.     FINDINGS:    Patient has been intubated with ET tube tip 2.3 cm above the samantha.  Esophagogastric tube below the diaphragm with the tip overlying the  proximal stomach in the gastric fundus. There is a right IJ central  venous catheter with the tip at the low SVC. Right upper extremity PICC  removed in the interval. Chronic right lower rib deformities noted.  Persistent right basilar atelectasis. Mild lucency at the left lateral  costophrenic angle which may relate to left basilar pneumothorax or  skinfold. There is bilateral interstitial thickening suggesting mild  pulmonary edema.       Impression:       1. Interval intubation with ET tube tip 2.3 cm above the samantha.   2. Remaining lines and support tubes stable.  3. Lucency at left lateral costophrenic angle may relate to left basilar  pneumothorax versus skinfold, recommend attention on follow-up  4. Bilateral interstitial thickening suggesting mild pulmonary edema.     Electronically Signed By-Wolf Beal On:8/10/2020 10:08 AM  This report was finalized on [id number] by  Wolf Beal, .    XR Abdomen KUB [[id number]] Collected:  08/10/20 1004     Updated:  08/10/20 1009    Narrative:       DATE OF EXAM:  8/10/2020 9:20 AM     PROCEDURE:  XR ABDOMEN KUB-     INDICATIONS:  Bloody stool this morning  with abdominal distention. Code.     COMPARISON:  AP abdomen 08/05/2020. CT abdomen 08/05/2020.     TECHNIQUE:   Single radiographic view of the abdomen was obtained.        FINDINGS:  There is moderate gaseous distention of the ascending, transverse and  descending colon. There is mild to moderate gaseous distention of what  is thought to represent small bowel loops in the left mid abdomen. The  gas distention has increased since the prior examination.     No gross free air is appreciated on this supine study. No pneumatosis is  seen.     Dobbhoff tube tip projects to the proximal gastric body region.  Transcutaneous pacer pad overlies the midline of the upper abdomen. An  NG tube is in place within the region of the proximal to mid gastric  body.     Bilateral hip replacements. Lumbar dextroscoliosis. T12 vertebroplasty.  Advanced osteopenia. Presumed right upper quadrant cholecystectomy clips  in place. The visualized portion lung bases appear unremarkable.             Impression:       Moderate gaseous distention of large and small bowel loops,  predominantly involving the colon, increased in comparison to the KUB  from 08/05/2020. FINDINGS are nonspecific but may represent features of  worsening ileus. No gross free air is seen on today's examination.     Electronically Signed By-Dr. Carli Mason MD On:8/10/2020 10:07 AM  This report was finalized on [id number] by Dr. Carli Mason MD.    MRI Brain Without Contrast [[id number]] Collected:  08/09/20 1547     Updated:  08/09/20 1554    Narrative:       MRI BRAIN WO CONTRAST-     Date of Exam: 8/9/2020 3:40 PM     Indication: Confusion/delirium, altered LOC, unexplained.     Comparison: CT 08/01/2020     Technique: Multiplanar multisequence images of the brain were performed  without contrast according to routine brain MRI protocol.     FINDINGS:  No restricted diffusion to suggest acute or subacute infarct.  No  intracranial mass, midline shift,  intracranial hemorrhage, or pathologic  extraaxial fluid collection.  The ventricular system is nondilated.   Brain volume is normal for patient's age.  The gray and white matter  signal characteristics are normal. Moderate to severe periventricular  and subcortical FLAIR signal changes are present. Global volume loss is  present which is likely age-related. Stable encephalomalacia is present  within the left frontal lobe.  The major intracranial flow voids are maintained.  The mastoid air cells  and paranasal sinuses are clear. The globes and extraocular muscles are  normal.  No cerebellar pontine angle masses.  Craniocervical junction is  normal.  Pituitary gland has normal size and signal intensity for  patient's age and gender.          Impression:          1. No evidence of hemorrhage, mass effect or midline shift. No evidence  of recent or acute ischemia.  2. Moderate to severe periventricular and subcortical FLAIR signal  changes likely related to chronic microvascular ischemic change.  3. Global volume loss, likely age related.        Electronically Signed By-Jackie Morejon On:8/9/2020 3:49 PM  This report was finalized on [id number] by  Jackie Morejon, .    XR Chest 1 View [[id number]] Collected:  08/07/20 1551     Updated:  08/07/20 1554    Narrative:       DATE OF EXAM:  8/7/2020 3:46 PM     PROCEDURE:  XR CHEST 1 VW-     INDICATIONS:  Line placement     COMPARISON:  08/04/2020     TECHNIQUE:   Single radiographic view of the chest was obtained.     FINDINGS:  Placement of right IJ Shiley catheter in satisfactory position. NG tube  courses through the thorax. Right upper extremity PICC line in  satisfactory position. Status post CABG. Bibasilar atelectasis. No  evidence of pneumothorax. Regional skeleton is unremarkable.       Impression:          1. Right IJ Shiley catheter in satisfactory position without evidence of  pneumothorax.     2. Bibasilar atelectasis.     Electronically Signed By-Hayden Rodriguez  On:8/7/2020 3:52 PM  This report was finalized on [id number] by  Hayden Rodriguez, .    US Gallbladder [[id number]] Collected:  08/07/20 1330     Updated:  08/07/20 1335    Narrative:       US GALLBLADDER-     Date of Exam: 8/7/2020 12:52 PM     Indication: Elevated LFTs.     Comparison: None available.     Technique: Transverse and sagittal ultrasound images of the right upper  quadrant were obtained. Doppler evaluation was also conducted.     FINDINGS:  The liver is normal in echotexture. No masses are identified. Status  post cholecystectomy. There is mild intra and extra hepatic biliary duct  dilation with common bile duct measuring up to 1.4 cm.  No obstructing  stone or mass is identified and this likely secondary to  postcholecystectomy changes. There is normal flow within the hepatic and  portal veins. Trace amount of perirenal fluid is identified. The right  kidney is otherwise unchanged from prior.       Impression:       Biliary ductal dilation is favored to be secondary to  postcholecystectomy changes.     Trace amount of fluid adjacent to the right kidney of uncertain etiology  or clinical significance.     Electronically Signed By-Hayden Rodriguez On:8/7/2020 1:33 PM  This report was finalized on [id number] by  Hayden Rodriguez, .                Note scribed by me for DEEPTI Marcelo    Attending physician statement:  Above note scribed by nurse practitioner for me and later reviewed for accuracy. I've examined the patient and reviewed all labs and images.   I have directly participated in the evaluation and management of this patient.  Rbobie Holden MD  Pulmonary and [facility]  [facility]

## 2020-08-10 NOTE — PROGRESS NOTES
Nutrition Services    Patient Name:  Adolfo Alejandro  YOB: 1945  MRN: [id number]  Admit Date:  7/31/2020    Pt is now comfort care only, RDN will sign off.    Electronically signed by:  Vandana Wiley RD  08/10/20 10:10

## 2020-08-10 NOTE — PROGRESS NOTES
"                                                                                                                                      Nephrology  Progress Note                                        [facility]    Patient Identification    Name: Adolfo Alejandro  Age: 75 y.o.  Sex: male  :  1945  MRN: [id number]      DATE OF SERVICE:  8/10/2020        Subective    Still on oxygen     Objective   Scheduled Meds:    budesonide 0.5 mg Nebulization BID - RT   docusate 100 mg Oral BID   furosemide 40 mg Intravenous Q12H   insulin lispro 0-7 Units Subcutaneous Q6H   ipratropium-albuterol 3 mL Nebulization 4x Daily - RT   lactulose 30 g Oral Q6H   lamoTRIgine 100 mg Oral Nightly   lamoTRIgine 200 mg Oral Daily   Linezolid 600 mg Intravenous Q12H   methylPREDNISolone sodium succinate 40 mg Intravenous Q12H   pantoprazole 40 mg Intravenous Q12H   [START ON 2020] PARoxetine 40 mg Oral Daily   piperacillin-tazobactam 3.375 g Intravenous Q12H   polyethylene glycol 17 g Oral Daily   sodium chloride 10 mL Intravenous Q12H         Continuous Infusions:       PRN Meds:•  acetaminophen **OR** acetaminophen  •  aluminum-magnesium hydroxide-simethicone  •  bisacodyl  •  dextrose  •  dextrose  •  dextrose  •  dextrose  •  glucagon (human recombinant)  •  heparin (porcine)  •  hydrALAZINE  •  insulin lispro **AND** insulin lispro  •  ipratropium-albuterol  •  magnesium hydroxide  •  magnesium sulfate **OR** magnesium sulfate in D5W 1g/100mL (PREMIX)  •  ondansetron **OR** ondansetron  •  potassium chloride **OR** potassium chloride **OR** potassium chloride  •  [COMPLETED] Insert peripheral IV **AND** sodium chloride  •  sodium chloride     Exam:  /87   Pulse 112   Temp 100.1 °F (37.8 °C) (Axillary)   Resp 27   Ht 165.1 cm (65\")   Wt 57.9 kg (127 lb 10.3 oz)   SpO2 97%   BMI 21.24 kg/m²     Intake/Output last 3 shifts:  I/O last 3 completed shifts:  In: 1738.6 [I.V.:703.6; Other:333; " NG/GT:702]  Out: 1715 [Urine:1115; Other:600]    Intake/Output this shift:  I/O this shift:  In: 300 [IV Piggyback:300]  Out: -     Physical exam:  General Appearance: On BiPAP  Head:  Normocephalic, without obvious abnormality, atraumatic  Eyes:  PERRL, conjunctiva/corneas clear     Neck:  Supple,  no adenopathy;      Lungs:  Decreased BS occasion ronchi  Heart:  Regular rate and rhythm, S1 and S2 normal  Abdomen:  Soft, non-tender, bowel sounds active   Extremities: trace edema  Pulses: 2+ and symmetric all extremities  Skin:  No rashes or lesions       Data Review:  All labs (24hrs):   Recent Results (from the past 24 hour(s))   POC Glucose Once    Collection Time: 08/09/20 12:05 PM   Result Value Ref Range    Glucose 125 (H) 70 - 105 mg/dL   Ammonia    Collection Time: 08/09/20  4:00 PM   Result Value Ref Range    Ammonia 121 (H) 16 - 60 umol/L   POC Glucose Once    Collection Time: 08/09/20  5:26 PM   Result Value Ref Range    Glucose 162 (H) 70 - 105 mg/dL   POC Glucose Once    Collection Time: 08/10/20 12:52 AM   Result Value Ref Range    Glucose 126 (H) 70 - 105 mg/dL   POC Glucose Once    Collection Time: 08/10/20  4:57 AM   Result Value Ref Range    Glucose 155 (H) 70 - 105 mg/dL   Magnesium    Collection Time: 08/10/20  4:59 AM   Result Value Ref Range    Magnesium 2.6 (H) 1.6 - 2.4 mg/dL   Phosphorus    Collection Time: 08/10/20  4:59 AM   Result Value Ref Range    Phosphorus 5.7 (H) 2.5 - 4.5 mg/dL   Comprehensive Metabolic Panel    Collection Time: 08/10/20  4:59 AM   Result Value Ref Range    Glucose 161 (H) 65 - 99 mg/dL    BUN      Creatinine 4.60 (H) 0.76 - 1.27 mg/dL    Sodium 140 136 - 145 mmol/L    Potassium 4.7 3.5 - 5.2 mmol/L    Chloride 96 (L) 98 - 107 mmol/L    CO2 20.0 (L) 22.0 - 29.0 mmol/L    Calcium 8.9 8.6 - 10.5 mg/dL    Total Protein 6.2 6.0 - 8.5 g/dL    Albumin 2.80 (L) 3.50 - 5.20 g/dL    ALT (SGPT) 175 (H) 1 - 41 U/L    AST (SGOT) 409 (H) 1 - 40 U/L    Alkaline Phosphatase 481  (H) 39 - 117 U/L    Total Bilirubin 0.8 0.0 - 1.2 mg/dL    eGFR Non African Amer 12 (L) >60 mL/min/1.73    eGFR  African Amer      Globulin 3.4 gm/dL    A/G Ratio 0.8 g/dL    BUN/Creatinine Ratio      Anion Gap 24.0 (H) 5.0 - 15.0 mmol/L   BUN    Collection Time: 08/10/20  4:59 AM   Result Value Ref Range    BUN 94 (H) 8 - 23 mg/dL          Imaging:  [unfilled]    Assessment/Plan:     Dyspnea    Dissection of thoracic aorta (CMS/HCC)     Acute kidney injury likely due to the combination of contrast-induced nephropathy and ATN and diuretic use and possible AIN from antibiotics  Hyperkalemia  Hyponatremia  Urinary retention  Acute respiratory failure  Hypoxia  Fever  Pneumonia  COPD exacerbation  Congestive heart failure  Descending thoracic aorta dissection  Hypertension with hypertensive urgency    Dialysis today

## 2020-08-10 NOTE — PROGRESS NOTES
Case Management Discharge Note      Final Note: Patient Passed Away.     Provided Post Acute Provider List?: Yes  Post Acute Provider List: Inpatient Rehab  Delivered To: Patient  Method of Delivery: Telephone              Final Discharge Disposition Code: 41 -  in medical facility

## 2020-08-10 NOTE — CODE DOCUMENTATION
Code 4 was called due to PEA cardiac arrest. Patient vomited and aspirated with hypoxia and PEA cardiac arrest followed later by Vtach arrest.  Patient received CPR/EPI/intubation/NGT placement/shock/ Bicarb/Amiodarone/IVF boluses following ACLS protocol. Positive ROSC but unresponsive after resuscitation. Family changed code status to DRN. Patient passed at 10:07 AM.

## 2020-08-12 ENCOUNTER — TELEPHONE (OUTPATIENT)
Dept: FAMILY MEDICINE CLINIC | Facility: CLINIC | Age: 75
End: 2020-08-12

## 2020-08-12 NOTE — TELEPHONE ENCOUNTER
PT'S WIFE RUSS CALLED TO FOLLOW UP ON THE PT'S DEATH CERTIFICATE THAT WAS EMAILED TO DR. BEDOLLA. IT NEEDS TO BE SIGNED ELECTRONICALLY AND RETURNED TO THE  HOME ASAP.    PT'S WIFE CANNOT MOVE FORWARD WITH ANY  PLANS UNTIL THIS IS RECEIVED. THE  HOME [place] IN [place].    PLEASE RETURN ASAP.    CALLBACK: [phone]

## 2020-08-12 NOTE — TELEPHONE ENCOUNTER
Tell Ingrid the doctors who cared for him at the hospital need to complete this.  Usually the  homes know this and they should be contacting the hospitalist service for the form.

## 2020-08-13 ENCOUNTER — TELEPHONE (OUTPATIENT)
Dept: FAMILY MEDICINE CLINIC | Facility: CLINIC | Age: 75
End: 2020-08-13

## 2020-08-13 NOTE — TELEPHONE ENCOUNTER
Spoke to Maciej and the  home requests the signature form the hospitalitis. He said he would start there.

## 2020-10-11 NOTE — OUTREACH NOTE
Care Plan Note      Responses   Suggested Appointments  -- [make an appointment with PCP]   Annual Wellness Visit:   Patient Will Schedule   Care Gaps Addressed  Colon Cancer Screening, Flu Shot, Pneumonia Vaccine   Colon Cancer Screening Type  Colonoscopy   Colonoscopy Status  Patient Will Complete   Flu Shot Status  Up to Date   Pneumonia Vaccine Status  Up to Date   Specific Disease Process Teaching  Heart Failure, Hypertension   Other Patient Education/Resources   24/7 [employer] [facility] Nurse Call Line   24/7 Nurse Call Line Education Method  Verbal   Advanced Directives:  Send Materials   Medication Adherence  Medications understood        The main concerns and/or symptoms the patient would like to address are: pt discharged from [facility] ED on 11/25/19, seen for shortness of breath. RN-  CC outreach call made to pt. Spoke with pt's wife Merry. Merry states pt is sleeping, but she could speak for him. Merry states pt is feeling better, denies shortness of breath through the night or today. Merry states pt has been urinating more due to the Lasix but other than that he has no complaints. Merry states that the pt is drinking a lot of water and eating well. Pt denies chest pain. Merry states that they have not called PCP for follow up appointment because pt has appointment scheduled for next week, 12/6/19. Informed Merry that ED recommended pt follow up with PCP in 2 days. RN-CC advised Merry to call PCP office and notify them pt was in the ED, Merry states she will. RN-CC offered assistance if needed. Merry reports pt weighs himself every other day. Pt does not monitor blood pressure, as he does not have a cuff. Merry states they plan on getting another cuff. Pt lives with spouse, is independent with ADL's. Pt uses a cane to assist with ambulation inside the home and a wheelchair outside of the home, denies falls this year. Pt's family provides transportation. Pt's wife gives pt his medications and pt is compliant.      Education/instruction provided by Care Coordinator: Reviewed with pts spouse, Merry: ED AVS; education regarding CHF including when to report weight gain, symptoms to monitor, and when to seek medical attention; HTN; recommended medical follow, RN-CC urged pt to see PCP sooner; RN-CC contact information; [facility] 24 hour nurse line number; [facility]; and health maintenance gaps. Merry states pt was seeing a nurse from [facility] monthly in their home and they still speak with her on the phone when needed. Discussed AWV. Merry states pt will have AWV done at next appointment with PCP on 12/6/19. Merry states she plans to discuss pts need for colonoscopy with PCP at this appointment. Merry reports pt is up to date on flu and pneumonia vaccines. No questions or concerns per Merry at this time. Advised Merry to call RN-CC with any pt needs. No future outreach scheduled at this time.     Follow Up Outreach Due: as needed    Aron Boss RN  Ambulatory     11/26/2019, 1:02 PM     11-Oct-2020 02:36

## 2023-03-17 NOTE — TELEPHONE ENCOUNTER
No they didn't because its still pending   Dr. Vilchis was off yesterday     Home [address] [facility]isted living [facility]

## 2023-11-13 NOTE — PROGRESS NOTES
"                                                                                                                                      Nephrology  Progress Note                                        [facility]    Patient Identification    Name: Adolfo Alejandro  Age: 75 y.o.  Sex: male  :  1945  MRN: [id number]      DATE OF SERVICE:  2020        Subective    Still on oxygen  Blood pressure better     Objective   Scheduled Meds:    budesonide 0.5 mg Nebulization BID - RT   cefepime 2 g Intravenous Q24H   docusate 100 mg Oral BID   furosemide 40 mg Intravenous Q12H   insulin lispro 0-7 Units Subcutaneous Q6H   ipratropium-albuterol 3 mL Nebulization 4x Daily - RT   lamoTRIgine 100 mg Oral Nightly   lamoTRIgine 200 mg Oral Daily   Linezolid 600 mg Intravenous Q12H   methylPREDNISolone sodium succinate 40 mg Intravenous Q12H   midodrine 15 mg Oral Q8H   pantoprazole 40 mg Intravenous Q12H   [START ON 2020] PARoxetine 40 mg Oral Daily   polyethylene glycol 17 g Oral Daily   sodium chloride 10 mL Intravenous Q12H         Continuous Infusions:    norepinephrine 0.02-0.3 mcg/kg/min Last Rate: Stopped (20)   Pharmacy Consult - Pharmacy to dose         PRN Meds:•  acetaminophen **OR** acetaminophen  •  aluminum-magnesium hydroxide-simethicone  •  bisacodyl  •  dextrose  •  dextrose  •  dextrose  •  dextrose  •  glucagon (human recombinant)  •  hydrALAZINE  •  insulin lispro **AND** insulin lispro  •  ipratropium-albuterol  •  magnesium hydroxide  •  magnesium sulfate **OR** magnesium sulfate in D5W 1g/100mL (PREMIX)  •  ondansetron **OR** ondansetron  •  Pharmacy Consult - Pharmacy to dose  •  potassium chloride **OR** potassium chloride **OR** potassium chloride  •  [COMPLETED] Insert peripheral IV **AND** sodium chloride  •  sodium chloride     Exam:  /71   Pulse 97   Temp 97.3 °F (36.3 °C) (Axillary)   Resp 25   Ht 165.1 cm (65\")   Wt 60.8 kg (134 lb 0.6 oz)   SpO2 " 93%   BMI 22.31 kg/m²     Intake/Output last 3 shifts:  I/O last 3 completed shifts:  In: 5519.3 [I.V.:4631.3; Other:219; NG/GT:669]  Out: 2175 [Urine:2025; Emesis/NG output:150]    Intake/Output this shift:  No intake/output data recorded.    Physical exam:  General Appearance: On BiPAP  Head:  Normocephalic, without obvious abnormality, atraumatic  Eyes:  PERRL, conjunctiva/corneas clear     Neck:  Supple,  no adenopathy;      Lungs:  Decreased BS occasion ronchi  Heart:  Regular rate and rhythm, S1 and S2 normal  Abdomen:  Soft, non-tender, bowel sounds active   Extremities: trace edema  Pulses: 2+ and symmetric all extremities  Skin:  No rashes or lesions       Data Review:  All labs (24hrs):   Recent Results (from the past 24 hour(s))   Occult Blood, Fecal By Immunoassay - Stool, Per Rectum    Collection Time: 08/07/20  7:38 AM   Result Value Ref Range    Occult Blood, Fecal by Immunoassay Positive (A) Negative   CBC Auto Differential    Collection Time: 08/07/20  7:40 AM   Result Value Ref Range    WBC 14.90 (H) 3.40 - 10.80 10*3/mm3    RBC 3.89 (L) 4.14 - 5.80 10*6/mm3    Hemoglobin 10.8 (L) 13.0 - 17.7 g/dL    Hematocrit 34.3 (L) 37.5 - 51.0 %    MCV 88.1 79.0 - 97.0 fL    MCH 27.6 26.6 - 33.0 pg    MCHC 31.4 (L) 31.5 - 35.7 g/dL    RDW 16.1 (H) 12.3 - 15.4 %    RDW-SD 50.3 37.0 - 54.0 fl    MPV 8.8 6.0 - 12.0 fL    Platelets 163 140 - 450 10*3/mm3   Basic Metabolic Panel    Collection Time: 08/07/20  7:40 AM   Result Value Ref Range    Glucose 119 (H) 65 - 99 mg/dL    BUN      Creatinine 5.24 (H) 0.76 - 1.27 mg/dL    Sodium 137 136 - 145 mmol/L    Potassium 5.7 (H) 3.5 - 5.2 mmol/L    Chloride 93 (L) 98 - 107 mmol/L    CO2 23.0 22.0 - 29.0 mmol/L    Calcium 8.9 8.6 - 10.5 mg/dL    eGFR  African Amer      eGFR Non African Amer 11 (L) >60 mL/min/1.73    BUN/Creatinine Ratio      Anion Gap 21.0 (H) 5.0 - 15.0 mmol/L   Scan Slide    Collection Time: 08/07/20  7:40 AM   Result Value Ref Range    Scan Slide        Manual Differential    Collection Time: 08/07/20  7:40 AM   Result Value Ref Range    Neutrophil % 81.0 (H) 42.7 - 76.0 %    Lymphocyte % 1.0 (L) 19.6 - 45.3 %    Monocyte % 7.0 5.0 - 12.0 %    Bands %  11.0 (H) 0.0 - 5.0 %    Neutrophils Absolute 13.71 (H) 1.70 - 7.00 10*3/mm3    Lymphocytes Absolute 0.15 (L) 0.70 - 3.10 10*3/mm3    Monocytes Absolute 1.04 (H) 0.10 - 0.90 10*3/mm3    Anisocytosis Slight/1+ None Seen    Toxic Granulation Mod/2+ None Seen    Large Platelets Slight/1+ None Seen   BUN    Collection Time: 08/07/20  7:40 AM   Result Value Ref Range     (H) 8 - 23 mg/dL   Blood Gas, Arterial    Collection Time: 08/07/20  8:24 AM   Result Value Ref Range    Site Arterial Line     Andres's Test N/A     pH, Arterial 7.367 7.350 - 7.450 pH units    pCO2, Arterial 41.7 35.0 - 48.0 mm Hg    pO2, Arterial 90.1 83.0 - 108.0 mm Hg    HCO3, Arterial 23.9 21.0 - 28.0 mmol/L    Base Excess, Arterial -1.4 (L) 0.0 - 3.0 mmol/L    O2 Saturation, Arterial 96.7 94.0 - 98.0 %    CO2 Content 25.2 22 - 29 mmol/L    Barometric Pressure for Blood Gas      Modality BiPap     FIO2 30 %    Ventilator Mode ;NIV     Set Tidal Volume 500     PEEP 5     PSV 15 cmH2O    Hemodilution No    Procalcitonin    Collection Time: 08/07/20 10:18 AM   Result Value Ref Range    Procalcitonin 29.65 (H) 0.00 - 0.25 ng/mL   Acetaminophen Level    Collection Time: 08/07/20 10:18 AM   Result Value Ref Range    Acetaminophen <5.0 (L) 10.0 - 30.0 mcg/mL   Alpha - 1 - Antitrypsin    Collection Time: 08/07/20 10:18 AM   Result Value Ref Range    ALPHA -1 ANTITRYPSIN 289 (H) 90 - 200 mg/dL   Ceruloplasmin    Collection Time: 08/07/20 10:18 AM   Result Value Ref Range    Ceruloplasmin 33 (H) 16 - 31 mg/dL   Ferritin    Collection Time: 08/07/20 10:18 AM   Result Value Ref Range    Ferritin 2,880.00 (H) 30.00 - 400.00 ng/mL   Gamma GT    Collection Time: 08/07/20 10:18 AM   Result Value Ref Range    GGT 36 8 - 61 U/L   Iron    Collection Time:  08/07/20 10:18 AM   Result Value Ref Range    Iron 48 (L) 59 - 158 mcg/dL   TSH    Collection Time: 08/07/20 10:18 AM   Result Value Ref Range    TSH 1.400 0.270 - 4.200 uIU/mL   Lactate Dehydrogenase    Collection Time: 08/07/20 10:18 AM   Result Value Ref Range    LDH 1,059 (H) 135 - 225 U/L   POC Glucose Once    Collection Time: 08/07/20 11:52 AM   Result Value Ref Range    Glucose 108 (H) 70 - 105 mg/dL   Hepatitis Panel, Acute    Collection Time: 08/07/20 12:48 PM   Result Value Ref Range    Hepatitis B Surface Ag Non-Reactive Non-Reactive    Hep A IgM Non-Reactive Non-Reactive    Hep B C IgM Non-Reactive Non-Reactive    Hepatitis C Ab Non-Reactive Non-Reactive   Hep B Confirmation Tube    Collection Time: 08/07/20 12:48 PM   Result Value Ref Range    Extra Tube Hold for add-ons.    POC Glucose Once    Collection Time: 08/07/20  5:08 PM   Result Value Ref Range    Glucose 150 (H) 70 - 105 mg/dL   POC Glucose Once    Collection Time: 08/08/20 12:15 AM   Result Value Ref Range    Glucose 97 70 - 105 mg/dL   Magnesium    Collection Time: 08/08/20  4:09 AM   Result Value Ref Range    Magnesium 2.3 1.6 - 2.4 mg/dL   Phosphorus    Collection Time: 08/08/20  4:09 AM   Result Value Ref Range    Phosphorus 4.7 (H) 2.5 - 4.5 mg/dL   Comprehensive Metabolic Panel    Collection Time: 08/08/20  4:09 AM   Result Value Ref Range    Glucose 95 65 - 99 mg/dL    BUN      Creatinine 3.82 (H) 0.76 - 1.27 mg/dL    Sodium 139 136 - 145 mmol/L    Potassium 5.4 (H) 3.5 - 5.2 mmol/L    Chloride 99 98 - 107 mmol/L    CO2 24.0 22.0 - 29.0 mmol/L    Calcium 9.1 8.6 - 10.5 mg/dL    Total Protein 6.4 6.0 - 8.5 g/dL    Albumin 2.60 (L) 3.50 - 5.20 g/dL    ALT (SGPT) 341 (H) 1 - 41 U/L    AST (SGOT) 695 (H) 1 - 40 U/L    Alkaline Phosphatase 580 (H) 39 - 117 U/L    Total Bilirubin 0.5 0.0 - 1.2 mg/dL    eGFR Non African Amer 15 (L) >60 mL/min/1.73    Globulin 3.8 gm/dL    A/G Ratio 0.7 g/dL    BUN/Creatinine Ratio      Anion Gap 16.0 (H) 5.0  - 15.0 mmol/L   Protime-INR    Collection Time: 08/08/20  4:09 AM   Result Value Ref Range    Protime 11.6 9.6 - 11.7 Seconds    INR 1.07 0.93 - 1.10   BUN    Collection Time: 08/08/20  4:09 AM   Result Value Ref Range    BUN 66 (H) 8 - 23 mg/dL   CBC Auto Differential    Collection Time: 08/08/20  4:10 AM   Result Value Ref Range    WBC 15.40 (H) 3.40 - 10.80 10*3/mm3    RBC 3.81 (L) 4.14 - 5.80 10*6/mm3    Hemoglobin 10.8 (L) 13.0 - 17.7 g/dL    Hematocrit 33.4 (L) 37.5 - 51.0 %    MCV 87.7 79.0 - 97.0 fL    MCH 28.3 26.6 - 33.0 pg    MCHC 32.3 31.5 - 35.7 g/dL    RDW 16.0 (H) 12.3 - 15.4 %    RDW-SD 49.4 37.0 - 54.0 fl    MPV 8.7 6.0 - 12.0 fL    Platelets 152 140 - 450 10*3/mm3    Neutrophil % 91.4 (H) 42.7 - 76.0 %    Lymphocyte % 1.4 (L) 19.6 - 45.3 %    Monocyte % 6.7 5.0 - 12.0 %    Eosinophil % 0.0 (L) 0.3 - 6.2 %    Basophil % 0.5 0.0 - 1.5 %    Neutrophils, Absolute 14.00 (H) 1.70 - 7.00 10*3/mm3    Lymphocytes, Absolute 0.20 (L) 0.70 - 3.10 10*3/mm3    Monocytes, Absolute 1.00 (H) 0.10 - 0.90 10*3/mm3    Eosinophils, Absolute 0.00 0.00 - 0.40 10*3/mm3    Basophils, Absolute 0.10 0.00 - 0.20 10*3/mm3    nRBC 0.1 0.0 - 0.2 /100 WBC   POC Glucose Once    Collection Time: 08/08/20  6:03 AM   Result Value Ref Range    Glucose 118 (H) 70 - 105 mg/dL          Imaging:  [unfilled]    Assessment/Plan:     Dyspnea    Dissection of thoracic aorta (CMS/HCC)     Acute kidney injury likely due to the combination of contrast-induced nephropathy and ATN and diuretic use and possible AIN from antibiotics  Hyperkalemia  Hyponatremia  Urinary retention  Acute respiratory failure  Hypoxia  Fever  Pneumonia  COPD exacerbation  Congestive heart failure  Descending thoracic aorta dissection  Hypertension with hypertensive urgency      Making more urine but still not filtering  Status post dialysis yesterday  Dialysis again today because of high potassium  Holding parameters for midodrine   Discussed with the nurse  Discussed  with the pulmonary nurse practitioner      no